# Patient Record
Sex: FEMALE | Race: WHITE | NOT HISPANIC OR LATINO | Employment: FULL TIME | URBAN - METROPOLITAN AREA
[De-identification: names, ages, dates, MRNs, and addresses within clinical notes are randomized per-mention and may not be internally consistent; named-entity substitution may affect disease eponyms.]

---

## 2017-02-03 ENCOUNTER — OFFICE VISIT (OUTPATIENT)
Dept: UROLOGY | Facility: CLINIC | Age: 32
End: 2017-02-03
Payer: COMMERCIAL

## 2017-02-03 VITALS
DIASTOLIC BLOOD PRESSURE: 66 MMHG | SYSTOLIC BLOOD PRESSURE: 124 MMHG | HEART RATE: 72 BPM | WEIGHT: 240 LBS | HEIGHT: 70 IN | BODY MASS INDEX: 34.36 KG/M2

## 2017-02-03 DIAGNOSIS — N20.0 NEPHROLITHIASIS: Primary | ICD-10-CM

## 2017-02-03 PROCEDURE — 99207 ZZC NO CHARGE LOS: CPT | Performed by: PHYSICIAN ASSISTANT

## 2017-02-03 RX ORDER — MULTIPLE VITAMINS W/ MINERALS TAB 9MG-400MCG
1 TAB ORAL DAILY
COMMUNITY
End: 2018-03-27

## 2017-02-03 ASSESSMENT — PAIN SCALES - GENERAL: PAINLEVEL: NO PAIN (0)

## 2017-02-03 NOTE — PROGRESS NOTES
Patient here to review results of Litholink but no results ever received. Litholink states no urine was ever received. Patient will follow up with Litholink with provided tracking number. Will contact patient once results available. Today's visit will be billed as no charge as nothing to discuss.  Mayi Hudson PA-C

## 2017-02-03 NOTE — Clinical Note
2/3/2017       RE: Georgette Sanchez  [address] [address] [address]     Dear Colleague,    Thank you for referring your patient, Georgette Sanchez, to the [facility] UROLOGY CLINIC [place] at [facility]. Please see a copy of my visit note below.    Patient here to review results of Litholink but no results ever received. Litholink states no urine was ever received. Patient will follow up with Litholink with provided tracking number. Will contact patient once results available. Today's visit will be billed as no charge as nothing to discuss.  Mayi Hudson PA-C    Again, thank you for allowing me to participate in the care of your patient.      Sincerely,    Mayi Hudson PA-C

## 2017-02-03 NOTE — NURSING NOTE
Chief Complaint   Patient presents with     Results     Go over 24 hour urine      Uma Green LPN

## 2017-03-19 ENCOUNTER — TRANSFERRED RECORDS (OUTPATIENT)
Dept: HEALTH INFORMATION MANAGEMENT | Facility: CLINIC | Age: 32
End: 2017-03-19

## 2017-05-04 ENCOUNTER — HOSPITAL ENCOUNTER (OUTPATIENT)
Dept: GENERAL RADIOLOGY | Facility: CLINIC | Age: 32
Discharge: HOME | End: 2017-05-04
Attending: PHYSICIAN ASSISTANT | Admitting: PHYSICIAN ASSISTANT
Payer: COMMERCIAL

## 2017-05-04 ENCOUNTER — TELEPHONE (OUTPATIENT)
Dept: UROLOGY | Facility: CLINIC | Age: 32
End: 2017-05-04

## 2017-05-04 ENCOUNTER — HOSPITAL ENCOUNTER (OUTPATIENT)
Dept: ULTRASOUND IMAGING | Facility: CLINIC | Age: 32
End: 2017-05-04
Attending: PHYSICIAN ASSISTANT
Payer: COMMERCIAL

## 2017-05-04 DIAGNOSIS — Z87.442 HISTORY OF KIDNEY STONES: Primary | ICD-10-CM

## 2017-05-04 DIAGNOSIS — Z87.442 HISTORY OF KIDNEY STONES: ICD-10-CM

## 2017-05-04 PROCEDURE — 74010 XR KUB: CPT | Mod: 52

## 2017-05-04 PROCEDURE — 76770 US EXAM ABDO BACK WALL COMP: CPT

## 2017-05-04 NOTE — TELEPHONE ENCOUNTER
Patient called and LM on nurse line. She is concerned she may have a Kidney stone. She has been having pain since April 4th. Returned patient's phone call and LM. Will wait for return phone call. Uma Green LPN

## 2017-05-09 ENCOUNTER — OFFICE VISIT (OUTPATIENT)
Dept: UROLOGY | Facility: CLINIC | Age: 32
End: 2017-05-09
Payer: COMMERCIAL

## 2017-05-09 VITALS
WEIGHT: 265 LBS | HEART RATE: 90 BPM | SYSTOLIC BLOOD PRESSURE: 126 MMHG | BODY MASS INDEX: 37.94 KG/M2 | DIASTOLIC BLOOD PRESSURE: 80 MMHG | HEIGHT: 70 IN

## 2017-05-09 DIAGNOSIS — N20.0 KIDNEY STONE: Primary | ICD-10-CM

## 2017-05-09 DIAGNOSIS — R10.9 FLANK PAIN: ICD-10-CM

## 2017-05-09 DIAGNOSIS — N20.0 RECURRENT NEPHROLITHIASIS: Primary | ICD-10-CM

## 2017-05-09 LAB
ALBUMIN UR-MCNC: NEGATIVE MG/DL
APPEARANCE UR: CLEAR
BILIRUB UR QL STRIP: NEGATIVE
COLOR UR AUTO: YELLOW
GLUCOSE UR STRIP-MCNC: NEGATIVE MG/DL
HGB UR QL STRIP: NEGATIVE
KETONES UR STRIP-MCNC: NEGATIVE MG/DL
LEUKOCYTE ESTERASE UR QL STRIP: NEGATIVE
NITRATE UR QL: NEGATIVE
PH UR STRIP: 6 PH (ref 5–7)
SP GR UR STRIP: >1.03 (ref 1–1.03)
URN SPEC COLLECT METH UR: NORMAL
UROBILINOGEN UR STRIP-ACNC: 0.2 EU/DL (ref 0.2–1)

## 2017-05-09 PROCEDURE — 81003 URINALYSIS AUTO W/O SCOPE: CPT | Performed by: PHYSICIAN ASSISTANT

## 2017-05-09 PROCEDURE — 99213 OFFICE O/P EST LOW 20 MIN: CPT | Performed by: PHYSICIAN ASSISTANT

## 2017-05-09 ASSESSMENT — PAIN SCALES - GENERAL: PAINLEVEL: NO PAIN (0)

## 2017-05-09 NOTE — MR AVS SNAPSHOT
After Visit Summary   5/9/2017    Georgette Sanchez    MRN: [id number]           Patient Information     Date Of Birth          1985        Visit Information        Provider Department      5/9/2017 4:00 PM Mayi Hudson PA-C [facility] [place]        Today's Diagnoses     Recurrent nephrolithiasis    -  1    Flank pain          Care Instructions    Nephrology Referral Information: [facility]: [facility]  [place] [phone]   [email or web address]    Please be aware that coverage of these services is subject to the terms and limitations of your health insurance plan.  Call member services at your health plan with any benefit or coverage questions.      Reason for referral:  Recurrent nephrolithiasis  Please bring the following to your appointment:    >>   Any x-rays, CTs or MRIs which have been performed.  Contact the facility where they were done to arrange for  prior to your scheduled appointment.   >>   List of current medications   >>   This referral request   >>   Any documents/labs given to you for this referral          Follow-ups after your visit        Additional Services     NEPHROLOGY ADULT REFERRAL       Your provider has referred you to: N: [facility]  [place] [phone]   [email or web address]    Please be aware that coverage of these services is subject to the terms and limitations of your health insurance plan.  Call member services at your health plan with any benefit or coverage questions.      Reason for referral:  Recurrent nephrolithiasis  Please bring the following to your appointment:    >>   Any x-rays, CTs or MRIs which have been performed.  Contact the facility where they were done to arrange for  prior to your scheduled appointment.   >>   List of current medications   >>   This referral request   >>   Any documents/labs given to you for this referral                  Who  "to contact     If you have questions or need follow up information about today's clinic visit or your schedule please contact [facility]OLOGY [facility] [place] directly at [phone].  Normal or non-critical lab and imaging results will be communicated to you by MyChart, letter or phone within 4 business days after the clinic has received the results. If you do not hear from us within 7 days, please contact the clinic through [employer] or phone. If you have a critical or abnormal lab result, we will notify you by phone as soon as possible.  Submit refill requests through [employer] or call your pharmacy and they will forward the refill request to us. Please allow 3 business days for your refill to be completed.          Additional Information About Your Visit        [employer] Information     [employer] gives you secure access to your electronic health record. If you see a primary care provider, you can also send messages to your care team and make appointments. If you have questions, please call your primary care clinic.  If you do not have a primary care provider, please call [phone] and they will assist you.        Care EveryWhere ID     This is your Care EveryWhere ID. This could be used by other organizations to access your [facility] medical records  [id number]        Your Vitals Were     Pulse Height Last Period Breastfeeding? BMI (Body Mass Index)       90 1.778 m (5' 10\") 04/24/2017 (Approximate) No 38.02 kg/m2        Blood Pressure from Last 3 Encounters:   05/09/17 126/80   02/03/17 124/66   12/06/16 116/72    Weight from Last 3 Encounters:   05/09/17 120.2 kg (265 lb)   02/03/17 108.9 kg (240 lb)   12/06/16 112 kg (247 lb)              We Performed the Following     NEPHROLOGY ADULT REFERRAL     UA without Microscopic [[id number]]          Today's Medication Changes          These changes are accurate as of: 5/9/17 11:59 PM.  If you have any questions, ask your nurse or doctor.             "   Stop taking these medicines if you haven't already. Please contact your care team if you have questions.     HYDROcodone-acetaminophen  MG per tablet   Commonly known as:  NORCO   Stopped by:  Mayi Hudson PA-C                    Primary Care Provider    Physician No Ref-Primary       No address on file        Thank you!     Thank you for choosing [facility]OLOG[facility] CLIN[facility] [place]  for your care. Our goal is always to provide you with excellent care. Hearing back from our patients is one way we can continue to improve our services. Please take a few minutes to complete the written survey that you may receive in the mail after your visit with us. Thank you!             Your Updated Medication List - Protect others around you: Learn how to safely use, store and throw away your medicines at www.disposemymeds.org.          This list is accurate as of: 5/9/17 11:59 PM.  Always use your most recent med list.                   Brand Name Dispense Instructions for use    ibuprofen 600 MG tablet    ADVIL/MOTRIN    30 tablet    Take 1 tablet (600 mg) by mouth every 6 hours as needed for moderate pain       Multi-vitamin Tabs tablet      Take 1 tablet by mouth daily

## 2017-05-09 NOTE — NURSING NOTE
Chief Complaint   Patient presents with     Results     KUB results     Akbar Crowe LPN 3:54 PM May 9, 2017

## 2017-05-09 NOTE — LETTER
Date:May 12, 2017      Patient was self referred, no letter generated. Do not send.        [facility] Health Information

## 2017-05-09 NOTE — LETTER
5/9/2017       RE: Georgette Sanchez  [address] [address]     Dear Colleague,    Thank you for referring your patient, Georgette Sanchez, to the [facility]OLOGY CLINIC [place] at [facility]. Please see a copy of my visit note below.    REASON FOR VISIT: review KUB, ultrasound results and discuss kidney stones    CLINICAL DATA: Ms. Georgette Sanchez is a very pleasant 32 year old female who returns for follow up of the above. I first saw her in 12/2016 when she was passing a 5 mm left ureteral stone. This did eventually pass and stone analysis revealed 40% CaOx, 60% CaP. She does have a long history of recurrent kidney stones - passes a few stones every year for the past several years. Has never required surgery for kidney stones. Did see urology through [facility] previously - 24 hour urine collections were recommended but she never followed through on them. Therefore, we ordered 24 hour urine collections which revealed significantly decreased urine volume (< 1 L/day), borderline elevated urine calcium, mild hypocitraturia, and mild hypouricosuria which put her at high risk for calcium phosphate stone formation and moderate risk for calcium oxalate stone formation. Therefore recommended increased water intake (goal of 2-3 liters per day), low salt diet, moderation of protein intake, consumption of foods which contain citric acid, and limiting foods which contain oxalate.    Patient then called clinic last week complaining of some bilateral flank pain (R > L) which started in early April. Symptoms reminiscent of her previous kidney stone episodes. Offered to order CT A/P non-contrast but patient elected for KUB with renal ultrasound instead. She returns today to review results. Has not had any pain the past 2 days. Has not seen a stone pass in her urine. Denies gross hematuria, dysuria, frequency, urgency, fevers, chills, N/V.    PEx  BP  "126/80  Pulse 90  Ht 1.778 m (5' 10\")  Wt 120.2 kg (265 lb)  LMP 04/24/2017 (Approximate)  Breastfeeding? No  BMI 38.02 kg/m2  GEN: well-appearing female in NAD  RESP: no increased respiratory effort  ABD: soft, NT, ND    Urine dip today negative without blood or leukocytes    XR KUB 5/6/2017 9:32 AM  HISTORY: Bilateral Flank Discomfort, Personal history of urinary  calculi      IMPRESSION: Negative.      ULTRASOUND RENAL COMPLETE 5/4/2017 4:36 PM   HISTORY: Personal history of urinary calculi.   FINDINGS: The right kidney measured 12.9 cm in length with a cortical  thickness of 1.7 cm and the left kidney 12.4 cm in length with a  cortical thickness of 1.6 cm. Mild right hydronephrosis was noted.  Renal cortical echogenicity was within normal limits. No renal mass or  cyst was identified. No renal stone was visible. The urinary bladder  was only partly distended precluding evaluation of the bladder  trigone.     IMPRESSION: Mild right hydronephrosis.      ASSESSMENT:  1) Right flank pain, resolved  2) History of nephrolithiasis, recurrent (most recent stone analysis from 12/8/16 with 40% CaOx, 60% CaP)  3) Mild right hydronephrosis on renal ultrasound with negative KUB    PLAN:  32 year old female with history of recurrent urinary stone disease with a few weeks of right flank pain reminiscent of previous kidney stones episodes. Recent KUB did not reveal any obvious stones, but renal ultrasound did demonstrate mild hydronephrosis, possibly indicative of a right-sided stone passing. She has had no pain the past 2 days but has not seen any stones pass. CT from 11/2016 did reveal a 1 mm stone in the right kidney and a 3 mm stone in the left kidney. Possible she is now passing this right-sided stone or has formed new stones in the interim which are passing. Patient feels it is the latter. We did order 24 hour urine collections (details above in HPI) and she has been working on dietary stone prevention measures (as " discussed above). However, given her long history of recurrent stone disease at a young age, would likely benefit from continued evaluation and input from nephrology.    -Referral to [facility] placed. Will request office notes, Litholink results, and lab/imaging tests be forwarded to them for review.  -Continue to work on dietary modifications for now.  -As she has no pain currently and urine is negative today, possible she has since passed a small stone from the right kidney.   -Offered to repeat renal ultrasound to confirm resolution of mild right hydro but patient declines at this time.    She will call if flank pain returns or symptoms worsen. Would then proceed with CT A/P w/o contrast to definitively evaluate for a ureteral stone.     Otherwise, follow up in 6 months with KUB, sooner as needed.    25 minutes spent with the patient, >50% in counseling and coordination of care.    Mayi Hudson PA-C  Urology        Again, thank you for allowing me to participate in the care of your patient.      Sincerely,    Mayi Hudson PA-C

## 2017-05-09 NOTE — PATIENT INSTRUCTIONS
Nephrology Referral Information: N: Brad Aquino [phone]   [email or web address]    Please be aware that coverage of these services is subject to the terms and limitations of your health insurance plan.  Call member services at your health plan with any benefit or coverage questions.      Reason for referral:  Recurrent nephrolithiasis  Please bring the following to your appointment:    >>   Any x-rays, CTs or MRIs which have been performed.  Contact the facility where they were done to arrange for  prior to your scheduled appointment.   >>   List of current medications   >>   This referral request   >>   Any documents/labs given to you for this referral

## 2017-05-11 NOTE — PROGRESS NOTES
"REASON FOR VISIT: review KUB, ultrasound results and discuss kidney stones    CLINICAL DATA: Ms. Georgette Sanchez is a very pleasant 32 year old female who returns for follow up of the above. I first saw her in 12/2016 when she was passing a 5 mm left ureteral stone. This did eventually pass and stone analysis revealed 40% CaOx, 60% CaP. She does have a long history of recurrent kidney stones - passes a few stones every year for the past several years. Has never required surgery for kidney stones. Did see urology through [facility] previously - 24 hour urine collections were recommended but she never followed through on them. Therefore, we ordered 24 hour urine collections which revealed significantly decreased urine volume (< 1 L/day), borderline elevated urine calcium, mild hypocitraturia, and mild hypouricosuria which put her at high risk for calcium phosphate stone formation and moderate risk for calcium oxalate stone formation. Therefore recommended increased water intake (goal of 2-3 liters per day), low salt diet, moderation of protein intake, consumption of foods which contain citric acid, and limiting foods which contain oxalate.    Patient then called clinic last week complaining of some bilateral flank pain (R > L) which started in early April. Symptoms reminiscent of her previous kidney stone episodes. Offered to order CT A/P non-contrast but patient elected for KUB with renal ultrasound instead. She returns today to review results. Has not had any pain the past 2 days. Has not seen a stone pass in her urine. Denies gross hematuria, dysuria, frequency, urgency, fevers, chills, N/V.    PEx  /80  Pulse 90  Ht 1.778 m (5' 10\")  Wt 120.2 kg (265 lb)  LMP 04/24/2017 (Approximate)  Breastfeeding? No  BMI 38.02 kg/m2  GEN: well-appearing female in NAD  RESP: no increased respiratory effort  ABD: soft, NT, ND    Urine dip today negative without blood or leukocytes    XR KUB 5/6/2017 9:32 " AM  HISTORY: Bilateral Flank Discomfort, Personal history of urinary  calculi      IMPRESSION: Negative.      ULTRASOUND RENAL COMPLETE 5/4/2017 4:36 PM   HISTORY: Personal history of urinary calculi.   FINDINGS: The right kidney measured 12.9 cm in length with a cortical  thickness of 1.7 cm and the left kidney 12.4 cm in length with a  cortical thickness of 1.6 cm. Mild right hydronephrosis was noted.  Renal cortical echogenicity was within normal limits. No renal mass or  cyst was identified. No renal stone was visible. The urinary bladder  was only partly distended precluding evaluation of the bladder  trigone.     IMPRESSION: Mild right hydronephrosis.      ASSESSMENT:  1) Right flank pain, resolved  2) History of nephrolithiasis, recurrent (most recent stone analysis from 12/8/16 with 40% CaOx, 60% CaP)  3) Mild right hydronephrosis on renal ultrasound with negative KUB    PLAN:  32 year old female with history of recurrent urinary stone disease with a few weeks of right flank pain reminiscent of previous kidney stones episodes. Recent KUB did not reveal any obvious stones, but renal ultrasound did demonstrate mild hydronephrosis, possibly indicative of a right-sided stone passing. She has had no pain the past 2 days but has not seen any stones pass. CT from 11/2016 did reveal a 1 mm stone in the right kidney and a 3 mm stone in the left kidney. Possible she is now passing this right-sided stone or has formed new stones in the interim which are passing. Patient feels it is the latter. We did order 24 hour urine collections (details above in HPI) and she has been working on dietary stone prevention measures (as discussed above). However, given her long history of recurrent stone disease at a young age, would likely benefit from continued evaluation and input from nephrology.    -Referral to [facility] placed. Will request office notes, Litholink results, and lab/imaging tests be forwarded to them for  review.  -Continue to work on dietary modifications for now.  -As she has no pain currently and urine is negative today, possible she has since passed a small stone from the right kidney.   -Offered to repeat renal ultrasound to confirm resolution of mild right hydro but patient declines at this time.    She will call if flank pain returns or symptoms worsen. Would then proceed with CT A/P w/o contrast to definitively evaluate for a ureteral stone.     Otherwise, follow up in 6 months with KUB, sooner as needed.    25 minutes spent with the patient, >50% in counseling and coordination of care.    Mayi Hudson PA-C  Urology

## 2017-08-30 ENCOUNTER — TRANSFERRED RECORDS (OUTPATIENT)
Dept: HEALTH INFORMATION MANAGEMENT | Facility: CLINIC | Age: 32
End: 2017-08-30

## 2017-09-08 DIAGNOSIS — N20.0 URIC ACID NEPHROLITHIASIS: Primary | ICD-10-CM

## 2017-10-03 ENCOUNTER — HOSPITAL ENCOUNTER (OUTPATIENT)
Dept: LAB | Facility: CLINIC | Age: 32
Discharge: HOME | End: 2017-10-03
Attending: INTERNAL MEDICINE | Admitting: INTERNAL MEDICINE
Payer: COMMERCIAL

## 2017-10-03 DIAGNOSIS — N20.0 URIC ACID NEPHROLITHIASIS: ICD-10-CM

## 2017-10-03 LAB
ALBUMIN SERPL-MCNC: 4.3 G/DL (ref 3.4–5)
ANION GAP SERPL CALCULATED.3IONS-SCNC: 4 MMOL/L (ref 3–14)
BUN SERPL-MCNC: 13 MG/DL (ref 7–30)
CALCIUM SERPL-MCNC: 9.4 MG/DL (ref 8.5–10.1)
CHLORIDE SERPL-SCNC: 98 MMOL/L (ref 94–109)
CO2 SERPL-SCNC: 30 MMOL/L (ref 20–32)
CREAT SERPL-MCNC: 0.77 MG/DL (ref 0.52–1.04)
GFR SERPL CREATININE-BSD FRML MDRD: 86 ML/MIN/1.7M2
GLUCOSE SERPL-MCNC: 92 MG/DL (ref 70–99)
PHOSPHATE SERPL-MCNC: 2.2 MG/DL (ref 2.5–4.5)
POTASSIUM SERPL-SCNC: 3.1 MMOL/L (ref 3.4–5.3)
SODIUM SERPL-SCNC: 132 MMOL/L (ref 133–144)

## 2017-10-03 PROCEDURE — 80069 RENAL FUNCTION PANEL: CPT | Performed by: INTERNAL MEDICINE

## 2017-10-03 PROCEDURE — 36415 COLL VENOUS BLD VENIPUNCTURE: CPT | Performed by: INTERNAL MEDICINE

## 2017-10-09 DIAGNOSIS — N20.0 URIC ACID NEPHROLITHIASIS: Primary | ICD-10-CM

## 2017-10-09 DIAGNOSIS — E66.9 ADIPOSITY: ICD-10-CM

## 2017-12-31 ENCOUNTER — HEALTH MAINTENANCE LETTER (OUTPATIENT)
Age: 32
End: 2017-12-31

## 2018-02-16 DIAGNOSIS — E87.6 HYPOPOTASSEMIA: Primary | ICD-10-CM

## 2018-02-16 DIAGNOSIS — E83.52 HYPERCALCEMIA: ICD-10-CM

## 2018-03-20 ENCOUNTER — HOSPITAL ENCOUNTER (OUTPATIENT)
Dept: LAB | Facility: CLINIC | Age: 33
Discharge: HOME | End: 2018-03-20
Attending: INTERNAL MEDICINE | Admitting: INTERNAL MEDICINE
Payer: COMMERCIAL

## 2018-03-20 DIAGNOSIS — E83.52 HYPERCALCEMIA: ICD-10-CM

## 2018-03-20 DIAGNOSIS — E87.6 HYPOPOTASSEMIA: ICD-10-CM

## 2018-03-20 LAB
ALBUMIN SERPL-MCNC: 4.3 G/DL (ref 3.4–5)
ANION GAP SERPL CALCULATED.3IONS-SCNC: 8 MMOL/L (ref 3–14)
BUN SERPL-MCNC: 14 MG/DL (ref 7–30)
CALCIUM SERPL-MCNC: 9.9 MG/DL (ref 8.5–10.1)
CHLORIDE SERPL-SCNC: 102 MMOL/L (ref 94–109)
CO2 SERPL-SCNC: 28 MMOL/L (ref 20–32)
CREAT SERPL-MCNC: 0.8 MG/DL (ref 0.52–1.04)
GFR SERPL CREATININE-BSD FRML MDRD: 83 ML/MIN/1.7M2
GLUCOSE SERPL-MCNC: 87 MG/DL (ref 70–99)
PHOSPHATE SERPL-MCNC: 1.5 MG/DL (ref 2.5–4.5)
POTASSIUM SERPL-SCNC: 3.5 MMOL/L (ref 3.4–5.3)
SODIUM SERPL-SCNC: 138 MMOL/L (ref 133–144)

## 2018-03-20 PROCEDURE — 80069 RENAL FUNCTION PANEL: CPT | Performed by: INTERNAL MEDICINE

## 2018-03-20 PROCEDURE — 36415 COLL VENOUS BLD VENIPUNCTURE: CPT | Performed by: INTERNAL MEDICINE

## 2018-03-27 ENCOUNTER — AMBULATORY - HEALTHEAST (OUTPATIENT)
Dept: MULTI SPECIALTY CLINIC | Facility: CLINIC | Age: 33
End: 2018-03-27

## 2018-03-27 ENCOUNTER — OFFICE VISIT (OUTPATIENT)
Dept: INTERNAL MEDICINE | Facility: CLINIC | Age: 33
End: 2018-03-27
Payer: COMMERCIAL

## 2018-03-27 ENCOUNTER — HOSPITAL ENCOUNTER (OUTPATIENT)
Dept: LAB | Facility: CLINIC | Age: 33
Discharge: HOME | End: 2018-03-27
Attending: INTERNAL MEDICINE | Admitting: INTERNAL MEDICINE
Payer: COMMERCIAL

## 2018-03-27 VITALS
DIASTOLIC BLOOD PRESSURE: 80 MMHG | OXYGEN SATURATION: 98 % | TEMPERATURE: 98.4 F | HEIGHT: 69 IN | WEIGHT: 251.2 LBS | SYSTOLIC BLOOD PRESSURE: 100 MMHG | BODY MASS INDEX: 37.2 KG/M2 | HEART RATE: 97 BPM

## 2018-03-27 DIAGNOSIS — N93.8 DYSFUNCTIONAL UTERINE BLEEDING: ICD-10-CM

## 2018-03-27 DIAGNOSIS — N20.0 NEPHROLITHIASIS: ICD-10-CM

## 2018-03-27 DIAGNOSIS — Z00.00 ENCOUNTER FOR ROUTINE ADULT HEALTH EXAMINATION WITHOUT ABNORMAL FINDINGS: Primary | ICD-10-CM

## 2018-03-27 DIAGNOSIS — N20.0 URIC ACID NEPHROLITHIASIS: ICD-10-CM

## 2018-03-27 DIAGNOSIS — E66.9 ADIPOSITY: ICD-10-CM

## 2018-03-27 DIAGNOSIS — Z80.3 FAMILY HISTORY OF BREAST CANCER: ICD-10-CM

## 2018-03-27 LAB
ALBUMIN SERPL-MCNC: 4.5 G/DL (ref 3.4–5)
ALP SERPL-CCNC: 62 U/L (ref 40–150)
ALT SERPL W P-5'-P-CCNC: 42 U/L (ref 0–50)
ANION GAP SERPL CALCULATED.3IONS-SCNC: 5 MMOL/L (ref 3–14)
AST SERPL W P-5'-P-CCNC: 24 U/L (ref 0–45)
BASOPHILS # BLD AUTO: 0.1 10E9/L (ref 0–0.2)
BASOPHILS NFR BLD AUTO: 1.4 %
BILIRUB DIRECT SERPL-MCNC: 0.2 MG/DL (ref 0–0.2)
BILIRUB SERPL-MCNC: 1.2 MG/DL (ref 0.2–1.3)
BUN SERPL-MCNC: 10 MG/DL (ref 7–30)
CALCIUM SERPL-MCNC: 9.3 MG/DL (ref 8.5–10.1)
CHLORIDE SERPL-SCNC: 100 MMOL/L (ref 94–109)
CHOLEST SERPL-MCNC: 202 MG/DL
CO2 SERPL-SCNC: 29 MMOL/L (ref 20–32)
CREAT SERPL-MCNC: 0.71 MG/DL (ref 0.52–1.04)
DIFFERENTIAL METHOD BLD: NORMAL
EOSINOPHIL # BLD AUTO: 0.2 10E9/L (ref 0–0.7)
EOSINOPHIL NFR BLD AUTO: 2.1 %
ERYTHROCYTE [DISTWIDTH] IN BLOOD BY AUTOMATED COUNT: 12 % (ref 10–15)
GFR SERPL CREATININE-BSD FRML MDRD: >90 ML/MIN/1.7M2
GLUCOSE SERPL-MCNC: 87 MG/DL (ref 70–99)
HCT VFR BLD AUTO: 41.1 % (ref 35–47)
HDLC SERPL-MCNC: 46 MG/DL
HGB BLD-MCNC: 14.2 G/DL (ref 11.7–15.7)
HPV_EXT - HISTORICAL: NORMAL
IMM GRANULOCYTES # BLD: 0 10E9/L (ref 0–0.4)
IMM GRANULOCYTES NFR BLD: 0.3 %
LDLC SERPL CALC-MCNC: 132 MG/DL
LYMPHOCYTES # BLD AUTO: 2 10E9/L (ref 0.8–5.3)
LYMPHOCYTES NFR BLD AUTO: 27.5 %
MCH RBC QN AUTO: 32 PG (ref 26.5–33)
MCHC RBC AUTO-ENTMCNC: 34.5 G/DL (ref 31.5–36.5)
MCV RBC AUTO: 93 FL (ref 78–100)
MONOCYTES # BLD AUTO: 0.7 10E9/L (ref 0–1.3)
MONOCYTES NFR BLD AUTO: 10.1 %
NEUTROPHILS # BLD AUTO: 4.2 10E9/L (ref 1.6–8.3)
NEUTROPHILS NFR BLD AUTO: 58.6 %
NONHDLC SERPL-MCNC: 156 MG/DL
NRBC # BLD AUTO: 0 10*3/UL
NRBC BLD AUTO-RTO: 0 /100
PAP SMEAR - HIM PATIENT REPORTED: NORMAL
PLATELET # BLD AUTO: 330 10E9/L (ref 150–450)
POTASSIUM SERPL-SCNC: 3.6 MMOL/L (ref 3.4–5.3)
PROT SERPL-MCNC: 8.2 G/DL (ref 6.8–8.8)
RBC # BLD AUTO: 4.44 10E12/L (ref 3.8–5.2)
SODIUM SERPL-SCNC: 134 MMOL/L (ref 133–144)
TRIGL SERPL-MCNC: 118 MG/DL
TSH SERPL DL<=0.005 MIU/L-ACNC: 1.41 MU/L (ref 0.4–4)
WBC # BLD AUTO: 7.2 10E9/L (ref 4–11)

## 2018-03-27 PROCEDURE — 80048 BASIC METABOLIC PNL TOTAL CA: CPT | Performed by: INTERNAL MEDICINE

## 2018-03-27 PROCEDURE — 36415 COLL VENOUS BLD VENIPUNCTURE: CPT | Performed by: INTERNAL MEDICINE

## 2018-03-27 PROCEDURE — G0145 SCR C/V CYTO,THINLAYER,RESCR: HCPCS | Performed by: INTERNAL MEDICINE

## 2018-03-27 PROCEDURE — 85025 COMPLETE CBC W/AUTO DIFF WBC: CPT | Performed by: INTERNAL MEDICINE

## 2018-03-27 PROCEDURE — 80061 LIPID PANEL: CPT | Performed by: INTERNAL MEDICINE

## 2018-03-27 PROCEDURE — 80076 HEPATIC FUNCTION PANEL: CPT | Performed by: INTERNAL MEDICINE

## 2018-03-27 PROCEDURE — 87624 HPV HI-RISK TYP POOLED RSLT: CPT | Performed by: INTERNAL MEDICINE

## 2018-03-27 PROCEDURE — 99395 PREV VISIT EST AGE 18-39: CPT | Performed by: INTERNAL MEDICINE

## 2018-03-27 PROCEDURE — 84443 ASSAY THYROID STIM HORMONE: CPT | Performed by: INTERNAL MEDICINE

## 2018-03-27 NOTE — NURSING NOTE
"Chief Complaint   Patient presents with     Physical       Initial /80 (Cuff Size: Adult Large)  Pulse 97  Temp 98.4  F (36.9  C) (Oral)  Ht 5' 9.2\" (1.758 m)  Wt 251 lb 3.2 oz (113.9 kg)  LMP 03/16/2018  SpO2 98%  BMI 36.88 kg/m2 Estimated body mass index is 36.88 kg/(m^2) as calculated from the following:    Height as of this encounter: 5' 9.2\" (1.758 m).    Weight as of this encounter: 251 lb 3.2 oz (113.9 kg).  Medication Reconciliation: complete    "

## 2018-03-27 NOTE — MR AVS SNAPSHOT
After Visit Summary   3/27/2018    Georgette Sanchez    MRN: [id number]           Patient Information     Date Of Birth          1985        Visit Information        Provider Department      3/27/2018 1:00 PM Krystle Suárez MD [facility]        Today's Diagnoses     Encounter for routine adult health examination without abnormal findings    -  1    Dysfunctional uterine bleeding        Nephrolithiasis        Family history of breast cancer          Care Instructions    lamisil    vicks vapor rub          Follow-ups after your visit        Additional Services     CANCER RISK MGMT/CANCER GENETIC COUNSELING REFERRAL       Your provider has referred you to the Cancer Risk Management Program - Cancer Genetic Counseling.    Reason for Referral: h/o breast cancer in family- mom and aunt    We have a sent a notice to a staff member of the Cancer Risk Management Program to give you a call to assist with scheduling your appointment.  You may also call  [phone] ([phone]) to initiate scheduling.    Please be aware that coverage of these services is subject to the terms and limitations of your health insurance plan.  Call member services at your health plan with any benefit or coverage questions.      Please bring the completed family history sheet to your appointment in addition to any available outside medical records documenting your cancer diagnosis.            OB/GYN REFERRAL       Your provider has referred you to:  [facility]: [facility] [phone]   [email or web address]      Please be aware that coverage of these services is subject to the terms and limitations of your health insurance plan.  Call member services at your health plan with any benefit or coverage questions.      Please bring the following with you to your appointment:    (1) Any X-Rays, CTs or MRIs which have been performed.  Contact the facility  "where they were done to arrange for  prior to your scheduled appointment.   (2) List of current medications   (3) This referral request   (4) Any documents/labs given to you for this referral                  Who to contact     If you have questions or need follow up information about today's clinic visit or your schedule please contact [facility] directly at [phone].  Normal or non-critical lab and imaging results will be communicated to you by MyChart, letter or phone within 4 business days after the clinic has received the results. If you do not hear from us within 7 days, please contact the clinic through [employer] or phone. If you have a critical or abnormal lab result, we will notify you by phone as soon as possible.  Submit refill requests through [employer] or call your pharmacy and they will forward the refill request to us. Please allow 3 business days for your refill to be completed.          Additional Information About Your Visit        [employer] Information     [employer] gives you secure access to your electronic health record. If you see a primary care provider, you can also send messages to your care team and make appointments. If you have questions, please call your primary care clinic.  If you do not have a primary care provider, please call [phone] and they will assist you.        Care EveryWhere ID     This is your Care EveryWhere ID. This could be used by other organizations to access your [facility] medical records  [id number]        Your Vitals Were     Pulse Temperature Height Last Period Pulse Oximetry BMI (Body Mass Index)    97 98.4  F (36.9  C) (Oral) 5' 9.2\" (1.758 m) 03/16/2018 98% 36.88 kg/m2       Blood Pressure from Last 3 Encounters:   03/27/18 100/80   05/09/17 126/80   02/03/17 124/66    Weight from Last 3 Encounters:   03/27/18 251 lb 3.2 oz (113.9 kg)   05/09/17 265 lb (120.2 kg)   02/03/17 240 lb (108.9 kg)              We Performed the Following     " CANCER RISK MGMT/CANCER GENETIC COUNSELING REFERRAL     CBC with platelets differential     Hepatic panel (Albumin, ALT, AST, Bili, Alk Phos, TP)     Lipid panel reflex to direct LDL Fasting     OB/GYN REFERRAL     TSH with free T4 reflex        Primary Care Provider Fax #    Physician No Ref-Primary [phone]       No address on file        Equal Access to Services     BET[place] CHIO : Hadii aad ku hadtaraho Soomaali, waaxda luqadaha, qaybta kaalmada adeainsleysuzie, amira smith hayleylorena francistaylorjasmin boo. So [facility] [phone].    ATENCIÓN: Si habla español, tiene a watts disposición servicios gratuitos de asistencia lingüística. [facility] [phone].    We comply with applicable federal civil rights laws and [place] laws. We do not discriminate on the basis of race, color, national origin, age, disability, sex, sexual orientation, or gender identity.            Thank you!     Thank you for choosing [facility]  for your care. Our goal is always to provide you with excellent care. Hearing back from our patients is one way we can continue to improve our services. Please take a few minutes to complete the written survey that you may receive in the mail after your visit with us. Thank you!             Your Updated Medication List - Protect others around you: Learn how to safely use, store and throw away your medicines at www.disposemymeds.org.          This list is accurate as of 3/27/18  2:02 PM.  Always use your most recent med list.                   Brand Name Dispense Instructions for use Diagnosis    CHLORTHALIDONE PO      Take 25 mg by mouth daily        ibuprofen 600 MG tablet    ADVIL/MOTRIN    30 tablet    Take 1 tablet (600 mg) by mouth every 6 hours as needed for moderate pain

## 2018-03-29 LAB
COPATH REPORT: NORMAL
PAP: NORMAL

## 2018-04-02 LAB
FINAL DIAGNOSIS: NORMAL
HPV HR 12 DNA CVX QL NAA+PROBE: NEGATIVE
HPV16 DNA SPEC QL NAA+PROBE: NEGATIVE
HPV18 DNA SPEC QL NAA+PROBE: NEGATIVE
SPECIMEN DESCRIPTION: NORMAL
SPECIMEN SOURCE CVX/VAG CYTO: NORMAL

## 2018-04-15 ENCOUNTER — NURSE TRIAGE (OUTPATIENT)
Dept: NURSING | Facility: CLINIC | Age: 33
End: 2018-04-15

## 2018-04-15 ENCOUNTER — VIRTUAL VISIT (OUTPATIENT)
Dept: FAMILY MEDICINE | Facility: OTHER | Age: 33
End: 2018-04-15

## 2018-04-15 NOTE — PROGRESS NOTES
"Date:   Clinician: Rosanna Blair  Clinician NPI: [id number]  Patient: Georgette Sanchez  Patient : 1985  Patient Address: [address] [place] [id number]  Patient Phone: [phone]  Visit Protocol: Shingles  Patient Summary:  Georgette is a 33 year old ( : 1985 ) female who initiated a Visit for suspected Herpes zoster (shingles). When asked the question \"Please sign me up to receive news, health information and promotions from [facility].\", Georgette responded \"No\".    Images of her skin condition were uploaded.   Her symptoms started 1-3 days ago. The right side of her body is affected. The rash is located on her face. The rash is red and includes dry skin, crusts, scabs, drainage, and sores.   The affected area feels painful, like it burns, tender to touch, warm to touch, and itchy. The symptoms do not interfere with her sleep. Georgette experienced pain or unusual sensations in the location of the rash before it appeared.   Symptom details     Drainage: The color of the drainage is clear.    Pain: The pain is mild (between 1-3 on a 10 point scale).     Denied symptoms include scaly skin, flaky skin, numbness, and blisters. Georgette does not feel feverish.   Pertinent medical history  Georgette has had chickenpox and has had shingles in the past.   Ongoing medical conditions were denied.   She denies pregnancy and denies breastfeeding. She is currently menstruating.   Georgette does not smoke or use smokeless tobacco.  MEDICATIONS:    Atenolol-chlorthalidone oral  , ALLERGIES:     Penicillins    Clinician Response:  Dear Georgette,  Based on the information provided, you have Herpes Zoster (shingles). Shingles is a blistered rash caused by the same virus that causes chickenpox - Varicella Zoster.  Everyone who comes down with shingles has had chickenpox at some time in their life. After recovering from chickenpox, the inactive virus remains in the nerve cells. Shingles develops if the virus " becomes active. This reactivation can happen years or even decades later.   Because the virus spreads along a nerve, the rash is painful and appears as a stripe along one side of the body. The rash contains groups of blisters that break, crust over, and resolve within 3-6 weeks. Although rare, it is possible for the pain to last weeks after the rash has completely healed.  Medication information  I am prescribing:   Valacyclovir (Valtrex) 1 gram oral tablet. Take 1 tablet by mouth every 8 hours for 7 days. There are no refills with this prescription.  Medication is used to help speed up the healing process, but may not take your symptoms away completely.  Self care  Shingles is not spread from one person to another. However, because shingles and chickenpox are caused by the same virus, you could spread chickenpox to someone who has not had the illness or been vaccinated against it. Cover the rash with a loose bandage until all blisters scab over to prevent spreading the virus to those at risk for getting chickenpox.  Steps you can take to be as comfortable as possible:     Avoid touching the rash    Apply a cool, wet washcloth to your rash for 15 minutes several times a day    Take a lukewarm bath to soothe the skin. Adding colloidal oatmeal can help even more    Choose clothing and bedding made of a breathable material like cotton    Do not use antibiotic creams or ointments unless recommended by a provider     When to seek care  Please make an appointment to be seen in a clinic or go to an urgent care if any of the following occur:     Your rash doesn't improve after 14 days    You develop new symptoms or your symptoms become worse    Symptoms are so severe that you are unable to sleep or do regular activities    You are still experiencing pain one month after your shingles rash has completely healed    You notice symptoms of a skin infection (spreading redness, pain that is not improving, fevers, warmth)     Seek  care in an emergency room if you develop a fever, headache and sensitivity to light.   Diagnosis: Herpes zoster (shingles)  Diagnosis ICD: B02.8  Additional Clinician Notes:  Though your symptoms are consistent with shingles, it is a little difficult to tell from the pictures you uploaded. Given this I will issue the treatment plan for shingles including the virus medication. Please take this for the next week. If you get worse or not completely better in a week then please make sure you see your doctor.   Prescription: valacyclovir (Valtrex) 1 gram oral tablet 21 tablet, 7 days supply. Take 1 tablet by mouth every 8 hours for 7 days. Refills: 0, Refill as needed: no, Allow substitutions: yes  Pharmacy: [facility] [id number] - [phone] - [address] [address]

## 2018-04-15 NOTE — TELEPHONE ENCOUNTER
Patient states she has been treated for shingles in the past and has it again.  States went to  and then found out it was closed.  Patient requesting prescription for shingles.  FNA provided information for [facility].

## 2018-06-04 ENCOUNTER — OFFICE VISIT (OUTPATIENT)
Dept: INTERNAL MEDICINE | Facility: CLINIC | Age: 33
End: 2018-06-04
Payer: COMMERCIAL

## 2018-06-04 VITALS
BODY MASS INDEX: 38.98 KG/M2 | WEIGHT: 263.2 LBS | HEART RATE: 90 BPM | DIASTOLIC BLOOD PRESSURE: 80 MMHG | OXYGEN SATURATION: 98 % | TEMPERATURE: 98.4 F | SYSTOLIC BLOOD PRESSURE: 120 MMHG | HEIGHT: 69 IN | RESPIRATION RATE: 16 BRPM

## 2018-06-04 DIAGNOSIS — L73.9 FOLLICULITIS: ICD-10-CM

## 2018-06-04 DIAGNOSIS — L02.93 CARBUNCLE: Primary | ICD-10-CM

## 2018-06-04 PROCEDURE — 99213 OFFICE O/P EST LOW 20 MIN: CPT | Performed by: NURSE PRACTITIONER

## 2018-06-04 RX ORDER — CEPHALEXIN 500 MG/1
500 CAPSULE ORAL 2 TIMES DAILY
Qty: 20 CAPSULE | Refills: 0 | Status: SHIPPED | OUTPATIENT
Start: 2018-06-04 | End: 2019-01-31

## 2018-06-04 NOTE — NURSING NOTE
"Vital signs:  Temp: 98.4  F (36.9  C) Temp src: Oral BP: 120/80 Pulse: 90   Resp: 16 SpO2: 98 %     Height: 5' 9.2\" (175.8 cm) Weight: 263 lb 3.2 oz (119.4 kg)  Estimated body mass index is 38.64 kg/(m^2) as calculated from the following:    Height as of this encounter: 5' 9.2\" (1.758 m).    Weight as of this encounter: 263 lb 3.2 oz (119.4 kg).          "

## 2018-06-04 NOTE — PROGRESS NOTES
"  SUBJECTIVE:   Georgette Sanchez is a 33 year old female who presents to clinic today for the following health issues:      boils      Duration: 2 weeks    Description (location/character/radiation): L inner thigh large, tender boil. Suprapubic multiple small \"pimples\"    Intensity:  mild, moderate    Accompanying signs and symptoms: none    History (similar episodes/previous evaluation): boils 3-4 x yearly    Precipitating or alleviating factors: shaving pubic area    Therapies tried and outcome: None       Patient Active Problem List   Diagnosis     Nephrolithiasis     Dysfunctional uterine bleeding     Family history of breast cancer     Carbuncle     Folliculitis     Past Surgical History:   Procedure Laterality Date     AS REDUCTION OF LARGE BREAST  2001     TONSILLECTOMY         Social History   Substance Use Topics     Smoking status: Former Smoker     Types: Cigarettes     Smokeless tobacco: Never Used     Alcohol use 0.0 oz/week     0 Standard drinks or equivalent per week      Comment: socially     Family History   Problem Relation Age of Onset     Family History Negative Mother      Family History Negative Father          Current Outpatient Prescriptions   Medication Sig Dispense Refill     cephALEXin (KEFLEX) 500 MG capsule Take 1 capsule (500 mg) by mouth 2 times daily 20 capsule 0     CHLORTHALIDONE PO Take 25 mg by mouth daily       ibuprofen (ADVIL,MOTRIN) 600 MG tablet Take 1 tablet (600 mg) by mouth every 6 hours as needed for moderate pain 30 tablet 1     BP Readings from Last 3 Encounters:   06/04/18 120/80   03/27/18 100/80   05/09/17 126/80    Wt Readings from Last 3 Encounters:   06/04/18 263 lb 3.2 oz (119.4 kg)   03/27/18 251 lb 3.2 oz (113.9 kg)   05/09/17 265 lb (120.2 kg)                    Reviewed and updated as needed this visit by clinical staff  Tobacco  Allergies  Meds  Problems  Med Hx  Surg Hx  Fam Hx  Soc Hx        Reviewed and updated as needed this visit by Provider     " "    ROS:  CONSTITUTIONAL: NEGATIVE for fever, chills, change in weight  ENT/MOUTH: NEGATIVE for ear, mouth and throat problems  RESP: NEGATIVE for significant cough or SOB  CV: NEGATIVE for chest pain, palpitations or peripheral edema    OBJECTIVE:     /80  Pulse 90  Temp 98.4  F (36.9  C) (Oral)  Resp 16  Ht 5' 9.2\" (1.758 m)  Wt 263 lb 3.2 oz (119.4 kg)  SpO2 98%  BMI 38.64 kg/m2  Body mass index is 38.64 kg/(m^2).  GENERAL: alert, no distress and obese  SKIN: L medial thigh large single carbuncle, no drainage.  Suprapubic scattered folliculitis        ASSESSMENT/PLAN:               ICD-10-CM    1. Carbuncle L02.93 cephALEXin (KEFLEX) 500 MG capsule   2. Folliculitis L73.9 cephALEXin (KEFLEX) 500 MG capsule       Patient Instructions   antibacterial soap or Hibicens liquid  Avoid shaving, constrictive clothing  Genesis Mendez, NP  [facility]    "

## 2018-06-04 NOTE — MR AVS SNAPSHOT
"              After Visit Summary   6/4/2018    Georgette Sanchez    MRN: [id number]           Patient Information     Date Of Birth          1985        Visit Information        Provider Department      6/4/2018 3:40 PM Genesis Mendez NP [facility]        Today's Diagnoses     Carbuncle    -  1    Folliculitis          Care Instructions    antibacterial soap or Hibicens liquid  Avoid shaving, constrictive clothing  Genesis Mendez CNP            Follow-ups after your visit        Who to contact     If you have questions or need follow up information about today's clinic visit or your schedule please contact [facility] directly at [phone].  Normal or non-critical lab and imaging results will be communicated to you by [employer]Outhart, letter or phone within 4 business days after the clinic has received the results. If you do not hear from us within 7 days, please contact the clinic through [employer] or phone. If you have a critical or abnormal lab result, we will notify you by phone as soon as possible.  Submit refill requests through Slated or call your pharmacy and they will forward the refill request to us. Please allow 3 business days for your refill to be completed.          Additional Information About Your Visit        MyChart Information     Slated gives you secure access to your electronic health record. If you see a primary care provider, you can also send messages to your care team and make appointments. If you have questions, please call your primary care clinic.  If you do not have a primary care provider, please call [phone] and they will assist you.        Care EveryWhere ID     This is your Care EveryWhere ID. This could be used by other organizations to access your [place] medical records  [id number]        Your Vitals Were     Pulse Temperature Respirations Height Pulse Oximetry BMI (Body Mass Index)    90 98.4  F (36.9  C) (Oral) 16 5' 9.2\" (1.758 m) " 98% 38.64 kg/m2       Blood Pressure from Last 3 Encounters:   06/04/18 120/80   03/27/18 100/80   05/09/17 126/80    Weight from Last 3 Encounters:   06/04/18 263 lb 3.2 oz (119.4 kg)   03/27/18 251 lb 3.2 oz (113.9 kg)   05/09/17 265 lb (120.2 kg)              Today, you had the following     No orders found for display         Today's Medication Changes          These changes are accurate as of 6/4/18  4:12 PM.  If you have any questions, ask your nurse or doctor.               Start taking these medicines.        Dose/Directions    cephALEXin 500 MG capsule   Commonly known as:  KEFLEX   Used for:  Carbuncle, Folliculitis   Started by:  Genesis Mendez NP        Dose:  500 mg   Take 1 capsule (500 mg) by mouth 2 times daily   Quantity:  20 capsule   Refills:  0            Where to get your medicines      These medications were sent to [facility] [place] [address] [place] [address]     Phone:  [phone]     cephALEXin 500 MG capsule                Primary Care Provider Fax #    Physician No Ref-Primary [phone]       No address on file        Equal Access to Services     KRISTEL BARROSO AH: Jevon martinez hadasho Soomaali, waaxda luqadaha, qaybta kaalmada adeegyada, amira boo. So [facility] [phone].    ATENCIÓN: Si habla español, tiene a watts disposición servicios gratuitos de asistencia lingüística. Llame al [phone].    We comply with applicable federal civil rights laws and [place] laws. We do not discriminate on the basis of race, color, national origin, age, disability, sex, sexual orientation, or gender identity.            Thank you!     Thank you for choosing [facility]  for your care. Our goal is always to provide you with excellent care. Hearing back from our patients is one way we can continue to improve our services. Please take a few minutes to complete the written survey that  you may receive in the mail after your visit with us. Thank you!             Your Updated Medication List - Protect others around you: Learn how to safely use, store and throw away your medicines at www.disposemymeds.org.          This list is accurate as of 6/4/18  4:12 PM.  Always use your most recent med list.                   Brand Name Dispense Instructions for use Diagnosis    cephALEXin 500 MG capsule    KEFLEX    20 capsule    Take 1 capsule (500 mg) by mouth 2 times daily    Carbuncle, Folliculitis       CHLORTHALIDONE PO      Take 25 mg by mouth daily        ibuprofen 600 MG tablet    ADVIL/MOTRIN    30 tablet    Take 1 tablet (600 mg) by mouth every 6 hours as needed for moderate pain

## 2018-07-17 ENCOUNTER — MYC MEDICAL ADVICE (OUTPATIENT)
Dept: UROLOGY | Facility: CLINIC | Age: 33
End: 2018-07-17

## 2018-07-17 DIAGNOSIS — Z87.442 HISTORY OF NEPHROLITHIASIS: ICD-10-CM

## 2018-07-17 DIAGNOSIS — R10.9 FLANK PAIN: Primary | ICD-10-CM

## 2018-07-19 ENCOUNTER — HOSPITAL ENCOUNTER (OUTPATIENT)
Dept: CT IMAGING | Facility: CLINIC | Age: 33
Discharge: HOME | End: 2018-07-19
Attending: PHYSICIAN ASSISTANT | Admitting: PHYSICIAN ASSISTANT
Payer: COMMERCIAL

## 2018-07-19 DIAGNOSIS — N83.201 CYST OF RIGHT OVARY: Primary | ICD-10-CM

## 2018-07-19 DIAGNOSIS — R10.9 FLANK PAIN: ICD-10-CM

## 2018-07-19 DIAGNOSIS — Z87.442 HISTORY OF NEPHROLITHIASIS: ICD-10-CM

## 2018-07-19 PROCEDURE — 74176 CT ABD & PELVIS W/O CONTRAST: CPT

## 2018-07-24 ENCOUNTER — HOSPITAL ENCOUNTER (OUTPATIENT)
Dept: ULTRASOUND IMAGING | Facility: CLINIC | Age: 33
Discharge: HOME | End: 2018-07-24
Attending: PHYSICIAN ASSISTANT | Admitting: PHYSICIAN ASSISTANT
Payer: COMMERCIAL

## 2018-07-24 DIAGNOSIS — N83.201 CYST OF RIGHT OVARY: ICD-10-CM

## 2018-07-24 PROCEDURE — 76856 US EXAM PELVIC COMPLETE: CPT

## 2018-08-03 ENCOUNTER — MYC MEDICAL ADVICE (OUTPATIENT)
Dept: INTERNAL MEDICINE | Facility: CLINIC | Age: 33
End: 2018-08-03

## 2018-08-22 ENCOUNTER — OFFICE VISIT (OUTPATIENT)
Dept: INTERNAL MEDICINE | Facility: CLINIC | Age: 33
End: 2018-08-22
Payer: COMMERCIAL

## 2018-08-22 VITALS
HEIGHT: 69 IN | WEIGHT: 275.2 LBS | HEART RATE: 82 BPM | SYSTOLIC BLOOD PRESSURE: 131 MMHG | BODY MASS INDEX: 40.76 KG/M2 | OXYGEN SATURATION: 98 % | DIASTOLIC BLOOD PRESSURE: 88 MMHG | TEMPERATURE: 98 F

## 2018-08-22 DIAGNOSIS — R11.0 NAUSEA: ICD-10-CM

## 2018-08-22 DIAGNOSIS — M54.50 LUMBAR SPINE PAIN: Primary | ICD-10-CM

## 2018-08-22 DIAGNOSIS — N94.6 DYSMENORRHEA: ICD-10-CM

## 2018-08-22 PROBLEM — E66.01 MORBID OBESITY (H): Status: ACTIVE | Noted: 2018-08-22

## 2018-08-22 LAB — BETA HCG QUAL IFA URINE: NEGATIVE

## 2018-08-22 PROCEDURE — 99214 OFFICE O/P EST MOD 30 MIN: CPT | Performed by: INTERNAL MEDICINE

## 2018-08-22 PROCEDURE — 36415 COLL VENOUS BLD VENIPUNCTURE: CPT | Performed by: INTERNAL MEDICINE

## 2018-08-22 PROCEDURE — 80053 COMPREHEN METABOLIC PANEL: CPT | Performed by: INTERNAL MEDICINE

## 2018-08-22 PROCEDURE — 84703 CHORIONIC GONADOTROPIN ASSAY: CPT | Performed by: INTERNAL MEDICINE

## 2018-08-22 RX ORDER — LORAZEPAM 0.5 MG/1
0.25-0.5 TABLET ORAL
Qty: 4 TABLET | Refills: 0 | Status: SHIPPED | OUTPATIENT
Start: 2018-08-22 | End: 2019-01-31

## 2018-08-22 NOTE — PROGRESS NOTES
SUBJECTIVE:   Georgette Sanchez is a 33 year old female who presents to clinic today for the following health issues:      Back Pain       The patient has had 1.5 months of low middle back pain. The pain is constant.    The pain is 6/10.  The pain radiates to 10/10 at times.  This radiates madeline both legs.  No weakness or numbness/tingling.   She lifts patients at work, but does not remember a specific time.  Has not seen physical therapy in the past.   She feels better when she is standing.     The patient had a CT scan revealing a nonobstructing stone.   She also had an ovarian cyst.  The ovarian cyst was not seen on ultrasound.     She has had intermittent nausea for the past couple of months.  No epigastric pain.  Nausea is throughout the day.  She has been taking more ibuprofen.  She is sexually active and not on birth control.   LMP Aug 22.            Duration: 1  1/2 month        Specific cause: none    Description:   Location of pain: low back Middle  Character of pain: cramping and constant  Pain radiation:radiates into the right buttocks, radiates into the right leg, radiates into the left buttocks and radiates into the left leg  New numbness or weakness in legs, not attributed to pain:  no     Intensity: Currently 6/10    History:   Pain interferes with job: YES,   History of back problems: recurrent self limited episodes of low back pain in the past  Any previous MRI or X-rays: Yes- at [facility].  Date 7/19/18  Sees a specialist for back pain:  No  Therapies tried without relief: Ibuprofen and heat    Alleviating factors:   Improved by: heat      Precipitating factors:  Worsened by: Sitting      Problem list and histories reviewed & adjusted, as indicated.      Reviewed and updated as needed this visit by clinical staff  Tobacco  Allergies  Meds  Med Hx  Surg Hx  Fam Hx  Soc Hx      Reviewed and updated as needed this visit by Provider         ROS:  CONSTITUTIONAL: NEGATIVE for fever, chills, change  "in weight  RESP: NEGATIVE for significant cough or SOB  CV: NEGATIVE for chest pain, palpitations or peripheral edema  MSK: back pain  Neuro: no weakness or loss of bowel or bladder     OBJECTIVE:     /88 (BP Location: Left arm, Patient Position: Chair, Cuff Size: Adult Regular)  Pulse 82  Temp 98  F (36.7  C) (Oral)  Ht 5' 9.2\" (1.758 m)  Wt 275 lb 3.2 oz (124.8 kg)  LMP 08/22/2018 (Exact Date)  SpO2 98%  Breastfeeding? No  BMI 40.41 kg/m2  Body mass index is 40.41 kg/(m^2).  GENERAL: healthy, alert and no distress  RESP: lungs clear to auscultation - no rales, rhonchi or wheezes  CV: regular rate and rhythm, normal S1 S2, no S3 or S4, no murmur, click or rub  MSK: no pain upon palpation of lumbar spine or paraspinal muscles  Neuro: neg straight leg raise; strength 5/5 of lower extremities; reflexes intact    ASSESSMENT/PLAN:       (M54.5) Lumbar spine pain  (primary encounter diagnosis)  Comment: assess for lumbar stenosis  Plan: MR Lumbar Spine w/o Contrast, LORazepam         (ATIVAN) 0.5 MG tablet        -consider spine specialists versus physical therapy pending results    (R11.0) Nausea  Comment: assess for pregnancy; possibly secondary to pain  Plan: Beta HCG Qual, Urine - [facility] and [facility]         ([id number]), Comprehensive metabolic panel,         OB/GYN REFERRAL            (N94.6) Dysmenorrhea  Comment: chronic  Plan: OB/GYN REFERRAL              Krystle Suárez MD  [facility]    "

## 2018-08-23 LAB
ALBUMIN SERPL-MCNC: 4.1 G/DL (ref 3.4–5)
ALP SERPL-CCNC: 60 U/L (ref 40–150)
ALT SERPL W P-5'-P-CCNC: 21 U/L (ref 0–50)
ANION GAP SERPL CALCULATED.3IONS-SCNC: 8 MMOL/L (ref 3–14)
AST SERPL W P-5'-P-CCNC: 11 U/L (ref 0–45)
BILIRUB SERPL-MCNC: 0.6 MG/DL (ref 0.2–1.3)
BUN SERPL-MCNC: 15 MG/DL (ref 7–30)
CALCIUM SERPL-MCNC: 9 MG/DL (ref 8.5–10.1)
CHLORIDE SERPL-SCNC: 109 MMOL/L (ref 94–109)
CO2 SERPL-SCNC: 24 MMOL/L (ref 20–32)
CREAT SERPL-MCNC: 0.73 MG/DL (ref 0.52–1.04)
GFR SERPL CREATININE-BSD FRML MDRD: >90 ML/MIN/1.7M2
GLUCOSE SERPL-MCNC: 83 MG/DL (ref 70–99)
POTASSIUM SERPL-SCNC: 4.2 MMOL/L (ref 3.4–5.3)
PROT SERPL-MCNC: 7.5 G/DL (ref 6.8–8.8)
SODIUM SERPL-SCNC: 141 MMOL/L (ref 133–144)

## 2018-08-30 ENCOUNTER — HOSPITAL ENCOUNTER (OUTPATIENT)
Dept: MRI IMAGING | Facility: CLINIC | Age: 33
Discharge: HOME | End: 2018-08-30
Attending: INTERNAL MEDICINE | Admitting: INTERNAL MEDICINE
Payer: COMMERCIAL

## 2018-08-30 DIAGNOSIS — M54.50 LUMBAR SPINE PAIN: ICD-10-CM

## 2018-08-30 PROCEDURE — 72148 MRI LUMBAR SPINE W/O DYE: CPT

## 2018-09-06 ENCOUNTER — MYC MEDICAL ADVICE (OUTPATIENT)
Dept: INTERNAL MEDICINE | Facility: CLINIC | Age: 33
End: 2018-09-06

## 2018-09-07 ENCOUNTER — MYC MEDICAL ADVICE (OUTPATIENT)
Dept: INTERNAL MEDICINE | Facility: CLINIC | Age: 33
End: 2018-09-07

## 2018-09-07 DIAGNOSIS — M54.50 LUMBAR SPINE PAIN: Primary | ICD-10-CM

## 2018-09-07 NOTE — TELEPHONE ENCOUNTER
1. Called pt and discussed MRI results-she still has a lot of questions because she was reading that some of the things seen on the MRI (like the endplate edema) could lead to spinal stenosis , and the disc she has bulging could lead to a disc rupturing. She wonders if these are things she would need to be concerned about or watch out for? Or what she can reasonably expect?    2.She also wonders about ibuprofen usage for this back pain? She already takes high amounts during the week she has her menstrual cycle (up to 800mg TID), and she remembers Dr. Suárez telling her it would not be good to take high doses for a long time. She is also asking because she is not currently pregnant at this time, but she is trying to get pregnant, and she has read that this could cause an increased risk of miscarriage.     3. She also wonders what her next step should be? She is still having a lot of back pain.     Routed to Dr. Suárez.

## 2018-09-10 NOTE — TELEPHONE ENCOUNTER
Recommend that she see a spine specialist.    Recommend trying tylenol and heat and/or ice.  Also, spine specialist may refer to physical therapy.     Ibuprofen may increase risk of miscarriage.

## 2018-09-18 ENCOUNTER — OFFICE VISIT (OUTPATIENT)
Dept: ORTHOPEDICS | Facility: CLINIC | Age: 33
End: 2018-09-18
Payer: COMMERCIAL

## 2018-09-18 VITALS
WEIGHT: 275 LBS | SYSTOLIC BLOOD PRESSURE: 134 MMHG | HEIGHT: 69 IN | BODY MASS INDEX: 40.73 KG/M2 | DIASTOLIC BLOOD PRESSURE: 82 MMHG

## 2018-09-18 DIAGNOSIS — M51.369 BULGE OF LUMBAR DISC WITHOUT MYELOPATHY: Primary | ICD-10-CM

## 2018-09-18 PROCEDURE — 99204 OFFICE O/P NEW MOD 45 MIN: CPT | Performed by: FAMILY MEDICINE

## 2018-09-18 RX ORDER — METAXALONE 800 MG/1
800 TABLET ORAL 2 TIMES DAILY PRN
Qty: 30 TABLET | Refills: 1 | Status: SHIPPED | OUTPATIENT
Start: 2018-09-18 | End: 2019-01-31

## 2018-09-18 RX ORDER — CELECOXIB 200 MG/1
200 CAPSULE ORAL 2 TIMES DAILY PRN
Qty: 60 CAPSULE | Refills: 1 | Status: SHIPPED | OUTPATIENT
Start: 2018-09-18 | End: 2019-01-31

## 2018-09-18 NOTE — LETTER
9/18/2018         RE: Georgette Sanchez  [address]  [place] [address]        Dear Colleague,    Thank you for referring your patient, Georgette Sanchez, to the [facility]. Please see a copy of my visit note below.    ASSESSMENT & PLAN  Patient Instructions     1. Bulge of lumbar disc without myelopathy      -Patient has lower back pain due to a bulging L4-L5 disc  -MRI of the lumbar spine was reviewed with the patient and all questions were answered.  -Patient will start formal physical therapy and home exercise program.  -Patient will start Celebrex and Skelaxin as needed for pain and spasm.  If she continues to be pain, to consider tramadol for breakthrough pain.  -Patient will be placed on light duty at work until she is medically cleared.  Patient will follow-up in 4 weeks time to assess ability to return back to full duty  -Call direct clinic number [[phone]] at any time with questions or concerns.    Albert Yeo MD [facility]  [facility]          -----    SUBJECTIVE  Georgette Sanchez is a/an 33 year old female who is seen in consultation at the request of  Krystle Suárez M.D. for evaluation of low back pain. The patient is seen by themselves.    Onset: 3 month(s) ago. Reports insidious onset without acute precipitating event.  Location of Pain: bilateral low back with pain radiating down the gluteus muscles and anterior thighs  Rating of Pain at worst: 10/10  Rating of Pain Currently: 5/10  Worsened by: prolonged sitting  Better with: nothing  Treatments tried: rest/activity avoidance, heat, Tylenol, ibuprofen and other medications: [facility]  Quality: aching, shooting pricking, stiffness  Red flags: Weakness: Yes, bowel/bladder loss: Yes - patient states she lost control of bowel function once in August 2018 - unsure why, foot drop: No  Associated symptoms: no distal numbness or tingling; denies  "swelling or warmth  Orthopedic history: YES - lower back pain on and off for \"many years\"  Relevant surgical history: NO  Social History: patient works as a nurse in the cardiology department    Past Medical History:   Diagnosis Date     Fractured tibia 6/2015      Kidney stones      Social History     Social History     Marital status: Single     Spouse name: N/A     Number of children: N/A     Years of education: N/A     Social History Main Topics     Smoking status: Former Smoker     Types: Cigarettes     Smokeless tobacco: Never Used     Alcohol use 0.0 oz/week     0 Standard drinks or equivalent per week      Comment: socially     Drug use: No     Sexual activity: Yes     Partners: Male     Other Topics Concern     Not on file     Social History Narrative         Patient's past medical, surgical, social, and family histories were reviewed today and no changes are noted.    REVIEW OF SYSTEMS:  10 point ROS is negative other than symptoms noted above in HPI, Past Medical History or as stated below  Constitutional: NEGATIVE for fever, chills, change in weight  Skin: NEGATIVE for worrisome rashes, moles or lesions  GI/: NEGATIVE for bowel or bladder changes  Neuro: NEGATIVE for weakness, dizziness or paresthesias    OBJECTIVE:  /82 (BP Location: Right arm, Patient Position: Sitting, Cuff Size: Adult Large)  Ht 5' 9.2\" (1.758 m)  Wt 275 lb (124.7 kg)  LMP 08/22/2018 (Exact Date)  BMI 40.38 kg/m2   General: healthy, alert and in no distress  HEENT: no scleral icterus or conjunctival erythema  Skin: no suspicious lesions or rash. No jaundice.  CV: no pedal edema  Resp: normal respiratory effort without conversational dyspnea   Psych: normal mood and affect  Gait: normal steady gait with appropriate coordination and balance  Neuro: normal light touch sensory exam of the bilateral lower extremities.  DTR's 2+ patella and achilles bilaterally.  MSK:  THORACIC/LUMBAR SPINE  Inspection:    No gross " deformity/asymmetry  Palpation:    landmarks are nontender.  Range of Motion:     Lumbar flexion limited substantially by pain    Lumbar extension limited slightly by pain  Strength:    Full strength throughout hips/quads/hamstrings  Special Tests:    Positive: None    Negative: straight leg raise (bilateral), slump test (bilateral), femoral stretch test (bilateral)      Independent visualization of the below image:  Recent Results (from the past 744 hour(s))   MR Lumbar Spine w/o Contrast    Narrative    MRI LUMBAR SPINE WITHOUT CONTRAST   8/30/2018 12:44 PM     HISTORY:  Lumbar spine pain.    TECHNIQUE: Multiplanar multisequence MRI of the lumbar spine without  contrast.    COMPARISON: None.     FINDINGS:   Alignment is maintained. Bone marrow demonstrates  degenerative endplate edema which is mild most conspicuous surrounding  the L4-L5 intervertebral disc joint. Mild disc height loss is present  which is most conspicuous at L3-L4, L4-L5, and L5-S1 with additional  details below. Mild facet arthropathy is present throughout, most  conspicuous at L3-L4, L4-L5, and L5-S1 where there is fluid within the  bilateral facet joints. The conus medullaris and cauda equina are  unremarkable. Paraspinal soft tissues are unremarkable.    Segmental Analysis:     T12-L1:  No significant spinal canal or foraminal stenosis.     L1-L2:  No significant spinal canal or foraminal stenosis.      L2-L3:  No significant spinal canal or foraminal stenosis.      L3-L4:  Broad-based disc bulge. No foraminal or spinal canal stenosis.       L4-L5:  Broad-based disc bulge results in mild bilateral lateral  recess narrowing with possible contact of the traversing, left more  conspicuous than right, L5 nerve roots within the lateral recesses. No  foraminal or spinal canal stenosis.      L5-S1:  No foraminal or spinal canal stenosis.      Impression    IMPRESSION:   Mild degenerative change.    ERIC J HARTMAN, MD Albert Yeo MD  [facility]  [facility]      Again, thank you for allowing me to participate in the care of your patient.        Sincerely,        Albert Yeo, MD

## 2018-09-18 NOTE — MR AVS SNAPSHOT
After Visit Summary   9/18/2018    Georgette Sanchez    MRN: [id number]           Patient Information     Date Of Birth          1985        Visit Information        Provider Department      9/18/2018 11:20 AM Yeo, Albert, MD [facility]        Today's Diagnoses     Bulge of lumbar disc without myelopathy    -  1      Care Instructions    1. Bulge of lumbar disc without myelopathy      -Patient has lower back pain due to a bulging L4-L5 disc  -Patient will start formal physical therapy and home exercise program.  -Patient will start Celebrex and Skelaxin as needed for pain and spasm.  If she continues to be pain, to consider tramadol for breakthrough pain.  -Patient will be placed on light duty at work until she is medically cleared.  Patient will follow-up in 4 weeks time to assess ability to return back to full duty  -Call direct clinic number [[phone]] at any time with questions or concerns.    Albert Yeo MD [facility] and Sports Medicine  [facility]                Follow-ups after your visit        Additional Services     [facility] PT, HAND, AND CHIROPRACTIC REFERRAL       **This order will print in the [facility] Scheduling Office**    Physical Therapy, Hand Therapy and Chiropractic Care are available through:    *[facility]  *[facility]  *[facility]    Call one number to schedule at any of the above locations: [phone].    Your provider has referred you to: Physical Therapy at [facility] or [facility]    Indication/Reason for Referral: Low Back Pain  Onset of Illness:   Therapy Orders: Evaluate and Treat  Special Programs: None  Special Request: None    Frankie David      Additional Comments for the Therapist or Chiropractor:     Please be aware that coverage of these services is subject to the terms and limitations of your health insurance plan.  Call member services at your health plan with any  "benefit or coverage questions.      Please bring the following to your appointment:    *Your personal calendar for scheduling future appointments  *Comfortable clothing                  Who to contact     If you have questions or need follow up information about today's clinic visit or your schedule please contact [facility]IC[facility] directly at [phone].  Normal or non-critical lab and imaging results will be communicated to you by MyChart, letter or phone within 4 business days after the clinic has received the results. If you do not hear from us within 7 days, please contact the clinic through [employer] or phone. If you have a critical or abnormal lab result, we will notify you by phone as soon as possible.  Submit refill requests through [employer] or call your pharmacy and they will forward the refill request to us. Please allow 3 business days for your refill to be completed.          Additional Information About Your Visit        MyChart Information     [employer] gives you secure access to your electronic health record. If you see a primary care provider, you can also send messages to your care team and make appointments. If you have questions, please call your primary care clinic.  If you do not have a primary care provider, please call [phone] and they will assist you.        Care EveryWhere ID     This is your Care EveryWhere ID. This could be used by other organizations to access your [facility] medical records  [id number]        Your Vitals Were     Height Last Period BMI (Body Mass Index)             5' 9.2\" (1.758 m) 08/22/2018 (Exact Date) 40.38 kg/m2          Blood Pressure from Last 3 Encounters:   09/18/18 134/82   08/22/18 131/88   06/04/18 120/80    Weight from Last 3 Encounters:   09/18/18 275 lb (124.7 kg)   08/22/18 275 lb 3.2 oz (124.8 kg)   06/04/18 263 lb 3.2 oz (119.4 kg)              We Performed the Following     [facility] PT, HAND, AND CHIROPRACTIC REFERRAL          Today's " Medication Changes          These changes are accurate as of 9/18/18 12:07 PM.  If you have any questions, ask your nurse or doctor.               Start taking these medicines.        Dose/Directions    celecoxib 200 MG capsule   Commonly known as:  celeBREX   Used for:  Bulge of lumbar disc without myelopathy   Started by:  Yeo, Albert, MD        Dose:  200 mg   Take 1 capsule (200 mg) by mouth 2 times daily as needed for moderate pain   Quantity:  60 capsule   Refills:  1       metaxalone 800 MG tablet   Commonly known as:  SKELAXIN   Used for:  Bulge of lumbar disc without myelopathy   Started by:  Yeo, Albert, MD        Dose:  800 mg   Take 1 tablet (800 mg) by mouth 2 times daily as needed for moderate pain   Quantity:  30 tablet   Refills:  1            Where to get your medicines      These medications were sent to [facility] [place] [address] [place] [address] [address]     Phone:  [phone]     celecoxib 200 MG capsule    metaxalone 800 MG tablet                Primary Care Provider Fax #    Physician No Ref-Primary [phone]       No address on file        Equal Access to Services     [facility]: Hadrhys caraballo Somaureen, waaxda luqadaha, qaybta kaalmada thanh, amira yuan . So [facility] [phone].    ATENCIÓN: Si habla español, tiene a watts disposición servicios gratuitos de asistencia lingüística. Blaine al [phone].    We comply with applicable federal civil rights laws and [place] laws. We do not discriminate on the basis of race, color, national origin, age, disability, sex, sexual orientation, or gender identity.            Thank you!     Thank you for choosing [facility]  for your care. Our goal is always to provide you with excellent care. Hearing back from our patients is one way we can continue to improve our services. Please take a few minutes to complete the written  survey that you may receive in the mail after your visit with us. Thank you!             Your Updated Medication List - Protect others around you: Learn how to safely use, store and throw away your medicines at www.disposemymeds.org.          This list is accurate as of 9/18/18 12:07 PM.  Always use your most recent med list.                   Brand Name Dispense Instructions for use Diagnosis    celecoxib 200 MG capsule    celeBREX    60 capsule    Take 1 capsule (200 mg) by mouth 2 times daily as needed for moderate pain    Bulge of lumbar disc without myelopathy       cephALEXin 500 MG capsule    KEFLEX    20 capsule    Take 1 capsule (500 mg) by mouth 2 times daily    Carbuncle, Folliculitis       CHLORTHALIDONE PO      Take 25 mg by mouth daily        ibuprofen 600 MG tablet    ADVIL/MOTRIN    30 tablet    Take 1 tablet (600 mg) by mouth every 6 hours as needed for moderate pain        LORazepam 0.5 MG tablet    ATIVAN    4 tablet    Take 0.5-1 tablets (0.25-0.5 mg) by mouth once as needed for anxiety Prior to procedure    Lumbar spine pain       metaxalone 800 MG tablet    SKELAXIN    30 tablet    Take 1 tablet (800 mg) by mouth 2 times daily as needed for moderate pain    Bulge of lumbar disc without myelopathy

## 2018-09-18 NOTE — PATIENT INSTRUCTIONS
1. Bulge of lumbar disc without myelopathy      -Patient has lower back pain due to a bulging L4-L5 disc  -Patient will start formal physical therapy and home exercise program.  -Patient will start Celebrex and Skelaxin as needed for pain and spasm.  If she continues to be pain, to consider tramadol for breakthrough pain.  -Patient will be placed on light duty at work until she is medically cleared.  Patient will follow-up in 4 weeks time to assess ability to return back to full duty  -Call direct clinic number [[phone]] at any time with questions or concerns.    Albert Yeo MD [facility]  [facility] [place]

## 2018-09-18 NOTE — PROGRESS NOTES
"ASSESSMENT & PLAN  Patient Instructions     1. Bulge of lumbar disc without myelopathy      -Patient has lower back pain due to a bulging L4-L5 disc  -MRI of the lumbar spine was reviewed with the patient and all questions were answered.  -Patient will start formal physical therapy and home exercise program.  -Patient will start Celebrex and Skelaxin as needed for pain and spasm.  If she continues to be pain, to consider tramadol for breakthrough pain.  -Patient will be placed on light duty at work until she is medically cleared.  Patient will follow-up in 4 weeks time to assess ability to return back to full duty  -Call direct clinic number [[phone]] at any time with questions or concerns.    Albert Yeo MD [facility] Orthopedics and Sports Medicine  [facility]          -----    SUBJECTIVE  Georgette Sanchez is a/an 33 year old female who is seen in consultation at the request of  Krystle Suárez M.D. for evaluation of low back pain. The patient is seen by themselves.    Onset: 3 month(s) ago. Reports insidious onset without acute precipitating event.  Location of Pain: bilateral low back with pain radiating down the gluteus muscles and anterior thighs  Rating of Pain at worst: 10/10  Rating of Pain Currently: 5/10  Worsened by: prolonged sitting  Better with: nothing  Treatments tried: rest/activity avoidance, heat, Tylenol, ibuprofen and other medications: [facility]  Quality: aching, shooting pricking, stiffness  Red flags: Weakness: Yes, bowel/bladder loss: Yes - patient states she lost control of bowel function once in August 2018 - unsure why, foot drop: No  Associated symptoms: no distal numbness or tingling; denies swelling or warmth  Orthopedic history: YES - lower back pain on and off for \"many years\"  Relevant surgical history: NO  Social History: patient works as a nurse in the cardiology department    Past Medical History:   Diagnosis Date     Fractured tibia " "6/2015      Kidney stones      Social History     Social History     Marital status: Single     Spouse name: N/A     Number of children: N/A     Years of education: N/A     Social History Main Topics     Smoking status: Former Smoker     Types: Cigarettes     Smokeless tobacco: Never Used     Alcohol use 0.0 oz/week     0 Standard drinks or equivalent per week      Comment: socially     Drug use: No     Sexual activity: Yes     Partners: Male     Other Topics Concern     Not on file     Social History Narrative         Patient's past medical, surgical, social, and family histories were reviewed today and no changes are noted.    REVIEW OF SYSTEMS:  10 point ROS is negative other than symptoms noted above in HPI, Past Medical History or as stated below  Constitutional: NEGATIVE for fever, chills, change in weight  Skin: NEGATIVE for worrisome rashes, moles or lesions  GI/: NEGATIVE for bowel or bladder changes  Neuro: NEGATIVE for weakness, dizziness or paresthesias    OBJECTIVE:  /82 (BP Location: Right arm, Patient Position: Sitting, Cuff Size: Adult Large)  Ht 5' 9.2\" (1.758 m)  Wt 275 lb (124.7 kg)  LMP 08/22/2018 (Exact Date)  BMI 40.38 kg/m2   General: healthy, alert and in no distress  HEENT: no scleral icterus or conjunctival erythema  Skin: no suspicious lesions or rash. No jaundice.  CV: no pedal edema  Resp: normal respiratory effort without conversational dyspnea   Psych: normal mood and affect  Gait: normal steady gait with appropriate coordination and balance  Neuro: normal light touch sensory exam of the bilateral lower extremities.  DTR's 2+ patella and achilles bilaterally.  MSK:  THORACIC/LUMBAR SPINE  Inspection:    No gross deformity/asymmetry  Palpation:    landmarks are nontender.  Range of Motion:     Lumbar flexion limited substantially by pain    Lumbar extension limited slightly by pain  Strength:    Full strength throughout hips/quads/hamstrings  Special Tests:    Positive: " None    Negative: straight leg raise (bilateral), slump test (bilateral), femoral stretch test (bilateral)      Independent visualization of the below image:  Recent Results (from the past 744 hour(s))   MR Lumbar Spine w/o Contrast    Narrative    MRI LUMBAR SPINE WITHOUT CONTRAST   8/30/2018 12:44 PM     HISTORY:  Lumbar spine pain.    TECHNIQUE: Multiplanar multisequence MRI of the lumbar spine without  contrast.    COMPARISON: None.     FINDINGS:   Alignment is maintained. Bone marrow demonstrates  degenerative endplate edema which is mild most conspicuous surrounding  the L4-L5 intervertebral disc joint. Mild disc height loss is present  which is most conspicuous at L3-L4, L4-L5, and L5-S1 with additional  details below. Mild facet arthropathy is present throughout, most  conspicuous at L3-L4, L4-L5, and L5-S1 where there is fluid within the  bilateral facet joints. The conus medullaris and cauda equina are  unremarkable. Paraspinal soft tissues are unremarkable.    Segmental Analysis:     T12-L1:  No significant spinal canal or foraminal stenosis.     L1-L2:  No significant spinal canal or foraminal stenosis.      L2-L3:  No significant spinal canal or foraminal stenosis.      L3-L4:  Broad-based disc bulge. No foraminal or spinal canal stenosis.       L4-L5:  Broad-based disc bulge results in mild bilateral lateral  recess narrowing with possible contact of the traversing, left more  conspicuous than right, L5 nerve roots within the lateral recesses. No  foraminal or spinal canal stenosis.      L5-S1:  No foraminal or spinal canal stenosis.      Impression    IMPRESSION:   Mild degenerative change.    ERIC J HARTMAN, MD Albert Yeo MD [facility]

## 2018-09-18 NOTE — LETTER
September 18, 2018      Georgette Sanchez  [address]   [place] [address]        To Whom It May Concern:    Georgette Sanchez  was seen on 9/18/18. Patient will be placed on light duty until medically cleared.        Sincerely,        Albert Yeo, MD

## 2018-09-19 ENCOUNTER — TELEPHONE (OUTPATIENT)
Dept: ORTHOPEDICS | Facility: CLINIC | Age: 33
End: 2018-09-19

## 2018-09-20 NOTE — TELEPHONE ENCOUNTER
Patient picked up forms from clinic today. Forms were also faxed to [facility] Team @ [phone].    Mandy Hicks ATC, ATR

## 2018-09-20 NOTE — TELEPHONE ENCOUNTER
Returned patient call. Patient states that a workability form was faxed to us. Requested form be completed and faxed back to her work. Stated that we would work on the form and should have it done by the end of the day today. All questions were answered.    Mandy Hicks, LUZ, ATR

## 2018-09-20 NOTE — TELEPHONE ENCOUNTER
Reason for Call:  Form, our goal is to have forms completed with 7 days, however, some forms may require a visit or additional information.    Type of letter, form or note:  Workability Form    Who is the form from?: [facility] Accommodations Team    Where did the form come from: form was faxed in    Phone number of person requesting form: [phone] (patient)  Can we leave a detailed message on this number:  NO consent on file    Desired completion date of form: as soon as possible      How will form be returned?:  fax to [facility] Absence Management and Accommodations Team     Has the patient signed a consent form for release of information (may be included with form)? Not Applicable    Additional comments:     Form was started and place in Provider Basket for provider review/ completion at [facility].

## 2018-09-20 NOTE — TELEPHONE ENCOUNTER
Spoke with patient.  States she did see a spine specialist on 9-18-18 and things are taken care of.

## 2018-10-03 ENCOUNTER — THERAPY VISIT (OUTPATIENT)
Dept: PHYSICAL THERAPY | Facility: CLINIC | Age: 33
End: 2018-10-03
Payer: COMMERCIAL

## 2018-10-03 DIAGNOSIS — M54.50 BILATERAL LOW BACK PAIN WITHOUT SCIATICA: Primary | ICD-10-CM

## 2018-10-03 PROCEDURE — 97110 THERAPEUTIC EXERCISES: CPT | Mod: GP | Performed by: PHYSICAL THERAPIST

## 2018-10-03 PROCEDURE — 97161 PT EVAL LOW COMPLEX 20 MIN: CPT | Mod: GP | Performed by: PHYSICAL THERAPIST

## 2018-10-03 NOTE — PROGRESS NOTES
[facility] Initial Evaluation  Georgette Sanchez is a 33 year old  female referred to physical therapy by Dr. Yeo for treatment of low back pain with Precautions/Restrictions/MD instructions eval and treat     Physical Therapy Initial Evaluation: Subjective History      Injury/Condition Details:  Presenting Complaint Low back pain with referred pain to bilateral glutes   Onset Timing/Date June 2018   Mechanism Insidious      Symptom Behavior Details    Primary Pain Symptoms Location: Low back pain with bilateral glute pain  Quality: heavy ache   Frequency: Constant   Worst Pain 10/10   Best Pain 1/10   Symptom Provocators Sitting, lifting, transferring   Symptom Relievers Rounding of back, medication   Time of day dependent? No   Recent symptom change? Small improvement      Prior Testing/Intervention for current condition:  Prior Tests MRI of lumbar spine indicating:  L4-L5:  Broad-based disc bulge results in mild bilateral lateral  recess narrowing with possible contact of the traversing, left more conspicuous than right, L5 nerve roots within the lateral recesses. No foraminal or spinal canal stenosis.    Prior Treatment None      Lifestyle & General Medical History:  General Health Reported by Patient good   Employment Nurse   Usual physical activities  (within past year) Walking, running, strength training   Orthopaedic history None   Notable medical history Overweight       HPI                    Objective:  Standing Alignment:        Lumbar:  Anterior pelvic tilt                           Lumbar/SI Evaluation  ROM:    AROM Lumbar:   Flexion:            75%, moderate pain  Ext:                    90%, mild pain   Side Bend:        Left:  100%, painfree    Right:  100%, painfree  Rotation:           Left:  100%, painfree    Right:  100%, painfree  Side Glide:        Left:     Right:           Lumbar Myotomes:    T12-L3 (Hip Flex):  Left: 5    Right: 5  L2-4 (Quads):  Left:  5    Right:  5  L4  (Ankle DF):  Left:  5    Right:  5    S1 (Toe Raise):  Left: 5    Right: 5        Neural Tension/Mobility:      Left side:SLR or Slump  negative.     Right side:   Slump or SLR  negative.         Spinal Segmental Conclusions:     Level: Hypo noted at L4 and L5      SI joint/Sacrum:      Provocation:  Unremarkable                                          Hip Evaluation    Hip Strength:      Extension:  Left: 4+/5  Pain:Right: 4+/5    Pain:    Abduction:  Left: 4+/5     Pain:Right: 4+/5    Pain:                                 General     ROS    Assessment/Plan:    Patient is a 33 year old female with lumbar complaints.    Patient has the following significant findings with corresponding treatment plan.                Diagnosis 1:  LBP  Pain -  manual therapy, splint/taping/bracing/orthotics, self management, education, directional preference exercise and home program  Decreased ROM/flexibility - manual therapy, therapeutic exercise, therapeutic activity and home program  Decreased joint mobility - manual therapy, therapeutic exercise and therapeutic activity  Decreased strength - therapeutic exercise, therapeutic activities and home program  Decreased proprioception - neuro re-education, therapeutic activities and home program  Impaired muscle performance - neuro re-education and home program    Therapy Evaluation Codes:   1) History comprised of:   Personal factors that impact the plan of care:      None.    Comorbidity factors that impact the plan of care are:      Overweight.     Medications impacting care: Anti-inflammatory and Muscle relaxant.  2) Examination of Body Systems comprised of:   Body structures and functions that impact the plan of care:      Lumbar spine.   Activity limitations that impact the plan of care are:      Bending, Lifting, Sitting and Working.  3) Clinical presentation characteristics are:   Stable/Uncomplicated.  4) Decision-Making    Low complexity using standardized patient assessment  instrument and/or measureable assessment of functional outcome.  Cumulative Therapy Evaluation is: Low complexity.    Previous and current functional limitations:  (See Goal Flow Sheet for this information)    Short term and Long term goals: (See Goal Flow Sheet for this information)     Communication ability:  Patient appears to be able to clearly communicate and understand verbal and written communication and follow directions correctly.  Treatment Explanation - The following has been discussed with the patient:   RX ordered/plan of care  Anticipated outcomes  Possible risks and side effects  This patient would benefit from PT intervention to resume normal activities.   Rehab potential is good.    Frequency:  1 X week, once daily  Duration:  for 6 weeks  Discharge Plan:  Achieve all LTG.  Independent in home treatment program.  Reach maximal therapeutic benefit.    Please refer to the daily flowsheet for treatment today, total treatment time and time spent performing 1:1 timed codes.

## 2018-10-10 ENCOUNTER — THERAPY VISIT (OUTPATIENT)
Dept: PHYSICAL THERAPY | Facility: CLINIC | Age: 33
End: 2018-10-10
Attending: FAMILY MEDICINE
Payer: COMMERCIAL

## 2018-10-10 DIAGNOSIS — M54.50 BILATERAL LOW BACK PAIN WITHOUT SCIATICA: ICD-10-CM

## 2018-10-10 PROCEDURE — 97112 NEUROMUSCULAR REEDUCATION: CPT | Mod: GP | Performed by: PHYSICAL THERAPIST

## 2018-10-10 PROCEDURE — 97110 THERAPEUTIC EXERCISES: CPT | Mod: GP | Performed by: PHYSICAL THERAPIST

## 2018-10-10 PROCEDURE — 97140 MANUAL THERAPY 1/> REGIONS: CPT | Mod: GP | Performed by: PHYSICAL THERAPIST

## 2018-10-17 ENCOUNTER — THERAPY VISIT (OUTPATIENT)
Dept: PHYSICAL THERAPY | Facility: CLINIC | Age: 33
End: 2018-10-17
Attending: FAMILY MEDICINE
Payer: COMMERCIAL

## 2018-10-17 DIAGNOSIS — M54.50 BILATERAL LOW BACK PAIN WITHOUT SCIATICA: ICD-10-CM

## 2018-10-17 PROCEDURE — 97112 NEUROMUSCULAR REEDUCATION: CPT | Mod: GP | Performed by: PHYSICAL THERAPIST

## 2018-10-17 PROCEDURE — 97110 THERAPEUTIC EXERCISES: CPT | Mod: GP | Performed by: PHYSICAL THERAPIST

## 2018-10-17 PROCEDURE — 97140 MANUAL THERAPY 1/> REGIONS: CPT | Mod: GP | Performed by: PHYSICAL THERAPIST

## 2018-10-18 ENCOUNTER — OFFICE VISIT (OUTPATIENT)
Dept: ORTHOPEDICS | Facility: CLINIC | Age: 33
End: 2018-10-18
Payer: COMMERCIAL

## 2018-10-18 VITALS
WEIGHT: 275 LBS | DIASTOLIC BLOOD PRESSURE: 71 MMHG | HEIGHT: 69 IN | SYSTOLIC BLOOD PRESSURE: 121 MMHG | BODY MASS INDEX: 40.73 KG/M2

## 2018-10-18 DIAGNOSIS — M51.369 BULGE OF LUMBAR DISC WITHOUT MYELOPATHY: Primary | ICD-10-CM

## 2018-10-18 PROCEDURE — 99214 OFFICE O/P EST MOD 30 MIN: CPT | Performed by: FAMILY MEDICINE

## 2018-10-18 RX ORDER — LIDOCAINE 50 MG/G
PATCH TOPICAL
Qty: 30 PATCH | Refills: 0 | Status: SHIPPED | OUTPATIENT
Start: 2018-10-18 | End: 2019-01-31

## 2018-10-18 RX ORDER — TRAMADOL HYDROCHLORIDE 50 MG/1
50 TABLET ORAL EVERY 6 HOURS PRN
Qty: 50 TABLET | Refills: 0 | Status: SHIPPED | OUTPATIENT
Start: 2018-10-18 | End: 2019-01-31

## 2018-10-18 NOTE — MR AVS SNAPSHOT
After Visit Summary   10/18/2018    Georgette Sanchez    MRN: [id number]           Patient Information     Date Of Birth          1985        Visit Information        Provider Department      10/18/2018 11:20 AM Yeo, Albert, MD [facility] MEDICINE        Today's Diagnoses     Bulge of lumbar disc without myelopathy    -  1      Care Instructions    1. Bulge of lumbar disc without myelopathy      -Patient is here for follow-up of lower back pain due to lumbar bulging disks and muscle spasms.  -Patient suffered a significant exacerbation of her injuries last week.  -Patient will continue with physical therapy and her home exercise program.  -Patient will take tramadol 50 mg as needed for severe breakthrough pain.  Patient will start Lidoderm patches to be worn throughout the day.  Patient may take Celebrex and/or her muscle relaxer for mild to moderate pain and muscle spasms.  -Patient will continue to work on light duty and we will reevaluate her work status in 4 weeks time.  -Call direct clinic number [[phone]] at any time with questions or concerns.    Albert Yeo MD [facility] Orthopedics and Sports Medicine  [facility]                Follow-ups after your visit        Who to contact     If you have questions or need follow up information about today's clinic visit or your schedule please contact [facility] directly at [phone].  Normal or non-critical lab and imaging results will be communicated to you by MyChart, letter or phone within 4 business days after the clinic has received the results. If you do not hear from us within 7 days, please contact the clinic through MyChart or phone. If you have a critical or abnormal lab result, we will notify you by phone as soon as possible.  Submit refill requests through [employer] or call your pharmacy and they will forward the refill request to us. Please allow 3 business days for your refill  "to be completed.          Additional Information About Your Visit        [email or web address] Information     [employer] gives you secure access to your electronic health record. If you see a primary care provider, you can also send messages to your care team and make appointments. If you have questions, please call your primary care clinic.  If you do not have a primary care provider, please call [phone] and they will assist you.        Care EveryWhere ID     This is your Care EveryWhere ID. This could be used by other organizations to access your [facility] medical records  [id number]        Your Vitals Were     Height BMI (Body Mass Index)                5' 9.2\" (1.758 m) 40.38 kg/m2           Blood Pressure from Last 3 Encounters:   10/18/18 121/71   09/18/18 134/82   08/22/18 131/88    Weight from Last 3 Encounters:   10/18/18 275 lb (124.7 kg)   09/18/18 275 lb (124.7 kg)   08/22/18 275 lb 3.2 oz (124.8 kg)              Today, you had the following     No orders found for display         Today's Medication Changes          These changes are accurate as of 10/18/18 11:59 AM.  If you have any questions, ask your nurse or doctor.               Start taking these medicines.        Dose/Directions    lidocaine 5 % Patch   Commonly known as:  LIDODERM   Used for:  Bulge of lumbar disc without myelopathy   Started by:  Yeo, Albert, MD        Apply up to 2 patches to painful area at once for up to 12 h within a 24 h period.  Remove after 12 hours.   Quantity:  30 patch   Refills:  0       naloxone nasal spray   Commonly known as:  NARCAN   Used for:  Bulge of lumbar disc without myelopathy   Started by:  Yeo, Albert, MD        Dose:  4 mg   Spray 1 spray (4 mg) into one nostril alternating nostrils as needed for opioid reversal every 2-3 minutes until assistance arrives   Quantity:  0.2 mL   Refills:  0       traMADol 50 MG tablet   Commonly known as:  ULTRAM   Used for:  Bulge of lumbar disc without myelopathy   Started by:  " Yeo, Albert, MD        Dose:  50 mg   Take 1 tablet (50 mg) by mouth every 6 hours as needed for severe pain   Quantity:  50 tablet   Refills:  0            Where to get your medicines      These medications were sent to [facility] [place], [place] - [address]  [address] [facility] [address]     Phone:  [phone]     lidocaine 5 % Patch    naloxone nasal spray         Some of these will need a paper prescription and others can be bought over the counter.  Ask your nurse if you have questions.     Bring a paper prescription for each of these medications     traMADol 50 MG tablet               Information about OPIOIDS     PRESCRIPTION OPIOIDS: WHAT YOU NEED TO KNOW   We gave you an opioid (narcotic) pain medicine. It is important to manage your pain, but opioids are not always the best choice. You should first try all the other options your care team gave you. Take this medicine for as short a time (and as few doses) as possible.    Some activities can increase your pain, such as bandage changes or therapy sessions. It may help to take your pain medicine 30 to 60 minutes before these activities. Reduce your stress by getting enough sleep, working on hobbies you enjoy and practicing relaxation or meditation. Talk to your care team about ways to manage your pain beyond prescription opioids.    These medicines have risks:    DO NOT drive when on new or higher doses of pain medicine. These medicines can affect your alertness and reaction times, and you could be arrested for driving under the influence (DUI). If you need to use opioids long-term, talk to your care team about driving.    DO NOT operate heavy machinery    DO NOT do any other dangerous activities while taking these medicines.    DO NOT drink any alcohol while taking these medicines.     If the opioid prescribed includes acetaminophen, DO NOT take with any other medicines that contain acetaminophen. Read all labels  carefully. Look for the word  acetaminophen  or  Tylenol.  Ask your pharmacist if you have questions or are unsure.    You can get addicted to pain medicines, especially if you have a history of addiction (chemical, alcohol or substance dependence). Talk to your care team about ways to reduce this risk.    All opioids tend to cause constipation. Drink plenty of water and eat foods that have a lot of fiber, such as fruits, vegetables, prune juice, apple juice and high-fiber cereal. Take a laxative (Miralax, milk of magnesia, Colace, Senna) if you don t move your bowels at least every other day. Other side effects include upset stomach, sleepiness, dizziness, throwing up, tolerance (needing more of the medicine to have the same effect), physical dependence and slowed breathing.    Store your pills in a secure place, locked if possible. We will not replace any lost or stolen medicine. If you don t finish your medicine, please throw away (dispose) as directed by your pharmacist. The [place] Pollution Control Agency has more information about safe disposal: [email or web address]         Primary Care Provider Fax #    Physician No Ref-Primary [phone]       No address on file        Equal Access to Services     KRISTEL BARROSO : Jevon Muniz, wanegrita singer, qaervin kaalmada thanh, amira boo. So [facility] [phone].    ATENCIÓN: Si habla español, tiene a watts disposición servicios gratuitos de asistencia lingüística. Blaine al [phone].    We comply with applicable federal civil rights laws and [place] laws. We do not discriminate on the basis of race, color, national origin, age, disability, sex, sexual orientation, or gender identity.            Thank you!     Thank you for choosing [facility]IC[facility]  for your care. Our goal is always to provide you with excellent care. Hearing back from our patients  is one way we can continue to improve our services. Please take a few minutes to complete the written survey that you may receive in the mail after your visit with us. Thank you!             Your Updated Medication List - Protect others around you: Learn how to safely use, store and throw away your medicines at www.disposemymeds.org.          This list is accurate as of 10/18/18 11:59 AM.  Always use your most recent med list.                   Brand Name Dispense Instructions for use Diagnosis    celecoxib 200 MG capsule    celeBREX    60 capsule    Take 1 capsule (200 mg) by mouth 2 times daily as needed for moderate pain    Bulge of lumbar disc without myelopathy       cephALEXin 500 MG capsule    KEFLEX    20 capsule    Take 1 capsule (500 mg) by mouth 2 times daily    Carbuncle, Folliculitis       CHLORTHALIDONE PO      Take 25 mg by mouth daily        ibuprofen 600 MG tablet    ADVIL/MOTRIN    30 tablet    Take 1 tablet (600 mg) by mouth every 6 hours as needed for moderate pain        lidocaine 5 % Patch    LIDODERM    30 patch    Apply up to 2 patches to painful area at once for up to 12 h within a 24 h period.  Remove after 12 hours.    Bulge of lumbar disc without myelopathy       LORazepam 0.5 MG tablet    ATIVAN    4 tablet    Take 0.5-1 tablets (0.25-0.5 mg) by mouth once as needed for anxiety Prior to procedure    Lumbar spine pain       metaxalone 800 MG tablet    SKELAXIN    30 tablet    Take 1 tablet (800 mg) by mouth 2 times daily as needed for moderate pain    Bulge of lumbar disc without myelopathy       naloxone nasal spray    NARCAN    0.2 mL    Spray 1 spray (4 mg) into one nostril alternating nostrils as needed for opioid reversal every 2-3 minutes until assistance arrives    Bulge of lumbar disc without myelopathy       traMADol 50 MG tablet    ULTRAM    50 tablet    Take 1 tablet (50 mg) by mouth every 6 hours as needed for severe pain    Bulge of lumbar disc without myelopathy

## 2018-10-18 NOTE — PATIENT INSTRUCTIONS
1. Bulge of lumbar disc without myelopathy      -Patient is here for follow-up of lower back pain due to lumbar bulging disks and muscle spasms.  -Patient suffered a significant exacerbation of her injuries last week.  -Patient will continue with physical therapy and her home exercise program.  -Patient will take tramadol 50 mg as needed for severe breakthrough pain.  Patient will start Lidoderm patches to be worn throughout the day.  Patient may take Celebrex and/or her muscle relaxer for mild to moderate pain and muscle spasms.  -Patient will continue to work on light duty and we will reevaluate her work status in 4 weeks time.  -Call direct clinic number [[phone]] at any time with questions or concerns.    Albert Yeo MD [facility] Orthopedics and Sports Medicine  [facility] [place]

## 2018-10-18 NOTE — PROGRESS NOTES
ASSESSMENT & PLAN  Patient Instructions     1. Bulge of lumbar disc without myelopathy      -Patient is here for follow-up of lower back pain due to lumbar bulging disks and muscle spasms.  -Patient suffered a significant exacerbation of her injuries last week.  -Patient will continue with physical therapy and her home exercise program.  -Patient will take tramadol 50 mg as needed for severe breakthrough pain.  Patient will start Lidoderm patches to be worn throughout the day.  Patient may take Celebrex and/or her muscle relaxer for mild to moderate pain and muscle spasms.  -Patient will continue to work on light duty and we will reevaluate her work status in 4 weeks time.  -Call direct clinic number [[phone]] at any time with questions or concerns.    Albert Yeo MD [facility] Orthopedics and Sports Medicine  [facility]          -----    SUBJECTIVE:  Georgette Sanchez is a 33 year old female who is seen in follow-up for low back pain.They were last seen 9/18/18.     Since their last visit reports 0% - (About the same as last time). They indicate that their current pain level is 5/10. They have tried rest/activity avoidance, other medications: celebrex, skelaxin, home exercises and physical therapy (3 visits). Have these treatments improved their pain: Patient states she was feeling better so she tried jogging on Saturday 10/13/18, which then aggravated her pain. Patient states she now feels about the same as she did at her first office visit.    The patient is seen by themselves.    Patient's past medical, surgical, social, and family histories were reviewed today and no changes are noted.    REVIEW OF SYSTEMS:  Constitutional: NEGATIVE for fever, chills, change in weight  Skin: NEGATIVE for worrisome rashes, moles or lesions  GI/: NEGATIVE for bowel or bladder changes  Neuro: NEGATIVE for weakness, dizziness or paresthesias    OBJECTIVE:  /71 (BP Location: Right arm, Patient  "Position: Sitting, Cuff Size: Adult Large)  Ht 5' 9.2\" (1.758 m)  Wt 275 lb (124.7 kg)  BMI 40.38 kg/m2   General: healthy, alert and in no distress  HEENT: no scleral icterus or conjunctival erythema  Skin: no suspicious lesions or rash. No jaundice.  CV: regular rhythm by palpation, no pedal edema  Resp: normal respiratory effort without conversational dyspnea   Psych: normal mood and affect  Gait: normal steady gait with appropriate coordination and balance  Neuro: normal light touch sensory exam of the extremities.    MSK:  THORACIC/LUMBAR SPINE  Inspection:    No gross deformity/asymmetry  Palpation:    Tender to palpation of the lower lumbar spinous processes and right paralumbar region per  Range of Motion:     Lumbar flexion limited substantially by pain    Lumbar extension limited slightly by pain  Lateral flexion and rotation slightly limited by pain as well.  Strength:    Full strength throughout hips/quads/hamstrings  Special Tests:    Positive: None    Negative: straight leg raise (bilateral), slump test (bilateral), femoral stretch test (bilateral)            Albert Yeo MD, [facility] Orthopedic Care    "

## 2018-10-18 NOTE — LETTER
10/18/2018         RE: Georgette aSnchez  [address]  [place] [address]        Dear Colleague,    Thank you for referring your patient, Georgette Sanchez, to the [facility] SP[facility]TS MEDIC[facility]. Please see a copy of my visit note below.    ASSESSMENT & PLAN  Patient Instructions     1. Bulge of lumbar disc without myelopathy      -Patient is here for follow-up of lower back pain due to lumbar bulging disks and muscle spasms.  -Patient suffered a significant exacerbation of her injuries last week.  -Patient will continue with physical therapy and her home exercise program.  -Patient will take tramadol 50 mg as needed for severe breakthrough pain.  Patient will start Lidoderm patches to be worn throughout the day.  Patient may take Celebrex and/or her muscle relaxer for mild to moderate pain and muscle spasms.  -Patient will continue to work on light duty and we will reevaluate her work status in 4 weeks time.  -Call direct clinic number [[phone]] at any time with questions or concerns.    Albert Yeo MD [facility] Orthopedics and Sports Medicine  [facility]          -----    SUBJECTIVE:  Georgette Sanchez is a 33 year old female who is seen in follow-up for low back pain.They were last seen 9/18/18.     Since their last visit reports 0% - (About the same as last time). They indicate that their current pain level is 5/10. They have tried rest/activity avoidance, other medications: celebrex, skelaxin, home exercises and physical therapy (3 visits). Have these treatments improved their pain: Patient states she was feeling better so she tried jogging on Saturday 10/13/18, which then aggravated her pain. Patient states she now feels about the same as she did at her first office visit.    The patient is seen by themselves.    Patient's past medical, surgical, social, and family histories were reviewed today and no changes are noted.    REVIEW OF SYSTEMS:  Constitutional: NEGATIVE  "for fever, chills, change in weight  Skin: NEGATIVE for worrisome rashes, moles or lesions  GI/: NEGATIVE for bowel or bladder changes  Neuro: NEGATIVE for weakness, dizziness or paresthesias    OBJECTIVE:  /71 (BP Location: Right arm, Patient Position: Sitting, Cuff Size: Adult Large)  Ht 5' 9.2\" (1.758 m)  Wt 275 lb (124.7 kg)  BMI 40.38 kg/m2   General: healthy, alert and in no distress  HEENT: no scleral icterus or conjunctival erythema  Skin: no suspicious lesions or rash. No jaundice.  CV: regular rhythm by palpation, no pedal edema  Resp: normal respiratory effort without conversational dyspnea   Psych: normal mood and affect  Gait: normal steady gait with appropriate coordination and balance  Neuro: normal light touch sensory exam of the extremities.    MSK:  THORACIC/LUMBAR SPINE  Inspection:    No gross deformity/asymmetry  Palpation:     Tender to palpation of the lower lumbar spinous processes and right paralumbar region per  Range of Motion:     Lumbar flexion limited substantially by pain    Lumbar extension limited slightly by pain  Lateral flexion and rotation slightly limited by pain as well.  Strength:    Full strength throughout hips/quads/hamstrings  Special Tests:    Positive: None    Negative: straight leg raise (bilateral), slump test (bilateral), femoral stretch test (bilateral)            Albert Yeo MD, [facility] Sports and Orthopedic Care      Again, thank you for allowing me to participate in the care of your patient.        Sincerely,        Albert Yeo, MD    "

## 2018-10-24 ENCOUNTER — THERAPY VISIT (OUTPATIENT)
Dept: PHYSICAL THERAPY | Facility: CLINIC | Age: 33
End: 2018-10-24
Payer: COMMERCIAL

## 2018-10-24 DIAGNOSIS — M54.50 BILATERAL LOW BACK PAIN WITHOUT SCIATICA: ICD-10-CM

## 2018-10-24 PROCEDURE — 97112 NEUROMUSCULAR REEDUCATION: CPT | Mod: GP | Performed by: PHYSICAL THERAPIST

## 2018-10-24 PROCEDURE — 99207 C STAT DRY NEEDLING - IAM: CPT | Mod: 25 | Performed by: PHYSICAL THERAPIST

## 2018-10-24 PROCEDURE — 97014 ELECTRIC STIMULATION THERAPY: CPT | Mod: GP | Performed by: PHYSICAL THERAPIST

## 2018-10-24 PROCEDURE — 97110 THERAPEUTIC EXERCISES: CPT | Mod: GP | Performed by: PHYSICAL THERAPIST

## 2018-10-31 ENCOUNTER — THERAPY VISIT (OUTPATIENT)
Dept: PHYSICAL THERAPY | Facility: CLINIC | Age: 33
End: 2018-10-31
Payer: COMMERCIAL

## 2018-10-31 DIAGNOSIS — M54.50 BILATERAL LOW BACK PAIN WITHOUT SCIATICA: ICD-10-CM

## 2018-10-31 PROCEDURE — 97110 THERAPEUTIC EXERCISES: CPT | Mod: GP | Performed by: PHYSICAL THERAPIST

## 2018-10-31 PROCEDURE — 97140 MANUAL THERAPY 1/> REGIONS: CPT | Mod: GP | Performed by: PHYSICAL THERAPIST

## 2018-11-03 ENCOUNTER — MYC MEDICAL ADVICE (OUTPATIENT)
Dept: INTERNAL MEDICINE | Facility: CLINIC | Age: 33
End: 2018-11-03

## 2018-11-14 ENCOUNTER — THERAPY VISIT (OUTPATIENT)
Dept: PHYSICAL THERAPY | Facility: CLINIC | Age: 33
End: 2018-11-14
Payer: COMMERCIAL

## 2018-11-14 DIAGNOSIS — M54.50 BILATERAL LOW BACK PAIN WITHOUT SCIATICA: ICD-10-CM

## 2018-11-14 PROCEDURE — 99207 C STAT DRY NEEDLING - IAM: CPT | Mod: 25 | Performed by: PHYSICAL THERAPIST

## 2018-11-14 PROCEDURE — 97032 APPL MODALITY 1+ESTIM EA 15: CPT | Mod: GP | Performed by: PHYSICAL THERAPIST

## 2018-11-14 PROCEDURE — 97110 THERAPEUTIC EXERCISES: CPT | Mod: GP | Performed by: PHYSICAL THERAPIST

## 2018-11-15 ENCOUNTER — OFFICE VISIT (OUTPATIENT)
Dept: ORTHOPEDICS | Facility: CLINIC | Age: 33
End: 2018-11-15
Payer: COMMERCIAL

## 2018-11-15 VITALS
DIASTOLIC BLOOD PRESSURE: 80 MMHG | WEIGHT: 275 LBS | SYSTOLIC BLOOD PRESSURE: 130 MMHG | BODY MASS INDEX: 40.73 KG/M2 | HEIGHT: 69 IN

## 2018-11-15 DIAGNOSIS — M51.369 BULGE OF LUMBAR DISC WITHOUT MYELOPATHY: Primary | ICD-10-CM

## 2018-11-15 PROCEDURE — 99213 OFFICE O/P EST LOW 20 MIN: CPT | Performed by: FAMILY MEDICINE

## 2018-11-15 NOTE — LETTER
11/15/2018         RE: Georgette Sanchez  [address]  [place] [address]        Dear Colleague,    Thank you for referring your patient, Georgette Sanchez, to the [facility] SP[facility]TS [facility]. Please see a copy of my visit note below.    ASSESSMENT & PLAN  Patient Instructions     1. Bulge of lumbar disc without myelopathy      -Patient is here for follow-up of lower back pain due to bulging lumbar  Disks  -Patient reports significant improvement with physical therapy and light duty at work.  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  I recommend to patient continue to see physical therapy approximately once a month for progression of exercises.  -Patient will progress activities as tolerated and follow-up as needed.  -Patient may continue to work out on her own.  I recommend TRX band exercises.  -Call direct clinic number [[phone]] at any time with questions or concerns.    Albert Yeo MD [facility] Orthopedics and Sports Medicine  [facility]          -----    SUBJECTIVE:  Georgette Sanchez is a 33 year old female who is seen in follow-up for low back pain.They were last seen 10/18/2018.     Since their last visit reports 70% improvement. They indicate that their current pain level is 1/10. They have tried rest/activity avoidance, ice, ibuprofen, home exercises, physical therapy (6 visits) and previous imaging (MRI 8/30/18). Have these treatments improved their pain: Patient states she if feeling better since her last visit. Patient states light duty, physical therapy and dry needling have helped improve her pain.    The patient is seen by themselves.    Patient's past medical, surgical, social, and family histories were reviewed today and no changes are noted.    REVIEW OF SYSTEMS:  Constitutional: NEGATIVE for fever, chills, change in weight  Skin: NEGATIVE for worrisome rashes, moles or lesions  GI/: NEGATIVE for bowel  "or bladder changes  Neuro: NEGATIVE for weakness, dizziness or paresthesias    OBJECTIVE:  /80  Ht 5' 9.2\" (1.758 m)  Wt 275 lb (124.7 kg)  BMI 40.38 kg/m2   General: healthy, alert and in no distress  HEENT: no scleral icterus or conjunctival erythema  Skin: no suspicious lesions or rash. No jaundice.  CV: regular rhythm by palpation, no pedal edema  Resp: normal respiratory effort without conversational dyspnea   Psych: normal mood and affect  Gait: normal steady gait with appropriate coordination and balance  Neuro: normal light touch sensory exam of the extremities.    MSK:  THORACIC/LUMBAR SPINE  Inspection:    No gross deformity/asymmetry  Palpation:  Mildly tender to palpation of the lower lumbar spinous processes and right paralumbar region per  Range of Motion:     Lumbar flexion slightly limited by pain and tightness    Lumbar extension within normal limits  Lateral flexion and rotation within normal limits  Strength:    Full strength throughout hips/quads/hamstrings  Special Tests:    Positive: None    Negative: straight leg raise (bilateral), slump test (bilateral), femoral stretch test (bilateral)        Albert Yeo MD, [facility] Sports and Orthopedic Care          Again, thank you for allowing me to participate in the care of your patient.        Sincerely,        Albert Yeo, MD    "

## 2018-11-15 NOTE — MR AVS SNAPSHOT
After Visit Summary   11/15/2018    Georgette Sanchez    MRN: [id number]           Patient Information     Date Of Birth          1985        Visit Information        Provider Department      11/15/2018 11:00 AM Yeo, Albert, MD [facility]        Today's Diagnoses     Bulge of lumbar disc without myelopathy    -  1      Care Instructions    1. Bulge of lumbar disc without myelopathy      -Patient is here for follow-up of lower back pain due to bulging lumbar  Disks  -Patient reports significant improvement with physical therapy and light duty at work.  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  I recommend to patient continue to see physical therapy approximately once a month for progression of exercises.  -Patient will progress activities as tolerated and follow-up as needed.  -Patient may continue to work out on her own.  I recommend TRX band exercises.  -Call direct clinic number [[phone]] at any time with questions or concerns.    Albert Yeo MD [facility] Medicine  [facility]                Follow-ups after your visit        Who to contact     If you have questions or need follow up information about today's clinic visit or your schedule please contact [facility] directly at [phone].  Normal or non-critical lab and imaging results will be communicated to you by MyChart, letter or phone within 4 business days after the clinic has received the results. If you do not hear from us within 7 days, please contact the clinic through MyChart or phone. If you have a critical or abnormal lab result, we will notify you by phone as soon as possible.  Submit refill requests through [employer] or call your pharmacy and they will forward the refill request to us. Please allow 3 business days for your refill to be completed.          Additional Information About Your  "Visit        MyChart Information     [employer] gives you secure access to your electronic health record. If you see a primary care provider, you can also send messages to your care team and make appointments. If you have questions, please call your primary care clinic.  If you do not have a primary care provider, please call [phone] and they will assist you.        Care EveryWhere ID     This is your Care EveryWhere ID. This could be used by other organizations to access your [place] medical records  [id number]        Your Vitals Were     Height BMI (Body Mass Index)                5' 9.2\" (1.758 m) 40.38 kg/m2           Blood Pressure from Last 3 Encounters:   11/15/18 130/80   10/18/18 121/71   09/18/18 134/82    Weight from Last 3 Encounters:   11/15/18 275 lb (124.7 kg)   10/18/18 275 lb (124.7 kg)   09/18/18 275 lb (124.7 kg)              Today, you had the following     No orders found for display       Primary Care Provider Fax #    Physician No Ref-Primary [phone]       No address on file        Equal Access to Services     [facility] : Hadii johana Muniz, waaxda lujesika, qaybta kaalmasuzie law, amira yuan . So [facility] [phone].    ATENCIÓN: Si habla español, tiene a watts disposición servicios gratuitos de asistencia lingüística. Blaine al [phone].    We comply with applicable federal civil rights laws and [place] laws. We do not discriminate on the basis of race, color, national origin, age, disability, sex, sexual orientation, or gender identity.            Thank you!     Thank you for choosing [facility]  for your care. Our goal is always to provide you with excellent care. Hearing back from our patients is one way we can continue to improve our services. Please take a few minutes to complete the written survey that you may receive in the mail after your visit with us. Thank you!             Your Updated Medication List - " Protect others around you: Learn how to safely use, store and throw away your medicines at www.disposemymeds.org.          This list is accurate as of 11/15/18 11:28 AM.  Always use your most recent med list.                   Brand Name Dispense Instructions for use Diagnosis    celecoxib 200 MG capsule    celeBREX    60 capsule    Take 1 capsule (200 mg) by mouth 2 times daily as needed for moderate pain    Bulge of lumbar disc without myelopathy       cephALEXin 500 MG capsule    KEFLEX    20 capsule    Take 1 capsule (500 mg) by mouth 2 times daily    Carbuncle, Folliculitis       CHLORTHALIDONE PO      Take 25 mg by mouth daily        ibuprofen 600 MG tablet    ADVIL/MOTRIN    30 tablet    Take 1 tablet (600 mg) by mouth every 6 hours as needed for moderate pain        lidocaine 5 % Patch    LIDODERM    30 patch    Apply up to 2 patches to painful area at once for up to 12 h within a 24 h period.  Remove after 12 hours.    Bulge of lumbar disc without myelopathy       LORazepam 0.5 MG tablet    ATIVAN    4 tablet    Take 0.5-1 tablets (0.25-0.5 mg) by mouth once as needed for anxiety Prior to procedure    Lumbar spine pain       metaxalone 800 MG tablet    SKELAXIN    30 tablet    Take 1 tablet (800 mg) by mouth 2 times daily as needed for moderate pain    Bulge of lumbar disc without myelopathy       naloxone nasal spray    NARCAN    0.2 mL    Spray 1 spray (4 mg) into one nostril alternating nostrils as needed for opioid reversal every 2-3 minutes until assistance arrives    Bulge of lumbar disc without myelopathy       traMADol 50 MG tablet    ULTRAM    50 tablet    Take 1 tablet (50 mg) by mouth every 6 hours as needed for severe pain    Bulge of lumbar disc without myelopathy

## 2018-11-15 NOTE — PROGRESS NOTES
"ASSESSMENT & PLAN  Patient Instructions     1. Bulge of lumbar disc without myelopathy      -Patient is here for follow-up of lower back pain due to bulging lumbar  Disks  -Patient reports significant improvement with physical therapy and light duty at work.  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  I recommend to patient continue to see physical therapy approximately once a month for progression of exercises.  -Patient will progress activities as tolerated and follow-up as needed.  -Patient may continue to work out on her own.  I recommend TRX band exercises.  -Call direct clinic number [[phone]] at any time with questions or concerns.    Albert Yeo MD [facility] Orthopedics and Sports Medicine  [facility]          -----    SUBJECTIVE:  Georgette Sanchez is a 33 year old female who is seen in follow-up for low back pain.They were last seen 10/18/2018.     Since their last visit reports 70% improvement. They indicate that their current pain level is 1/10. They have tried rest/activity avoidance, ice, ibuprofen, home exercises, physical therapy (6 visits) and previous imaging (MRI 8/30/18). Have these treatments improved their pain: Patient states she if feeling better since her last visit. Patient states light duty, physical therapy and dry needling have helped improve her pain.    The patient is seen by themselves.    Patient's past medical, surgical, social, and family histories were reviewed today and no changes are noted.    REVIEW OF SYSTEMS:  Constitutional: NEGATIVE for fever, chills, change in weight  Skin: NEGATIVE for worrisome rashes, moles or lesions  GI/: NEGATIVE for bowel or bladder changes  Neuro: NEGATIVE for weakness, dizziness or paresthesias    OBJECTIVE:  /80  Ht 5' 9.2\" (1.758 m)  Wt 275 lb (124.7 kg)  BMI 40.38 kg/m2   General: healthy, alert and in no distress  HEENT: no scleral icterus or conjunctival " erythema  Skin: no suspicious lesions or rash. No jaundice.  CV: regular rhythm by palpation, no pedal edema  Resp: normal respiratory effort without conversational dyspnea   Psych: normal mood and affect  Gait: normal steady gait with appropriate coordination and balance  Neuro: normal light touch sensory exam of the extremities.    MSK:  THORACIC/LUMBAR SPINE  Inspection:    No gross deformity/asymmetry  Palpation:  Mildly tender to palpation of the lower lumbar spinous processes and right paralumbar region per  Range of Motion:     Lumbar flexion slightly limited by pain and tightness    Lumbar extension within normal limits  Lateral flexion and rotation within normal limits  Strength:    Full strength throughout hips/quads/hamstrings  Special Tests:    Positive: None    Negative: straight leg raise (bilateral), slump test (bilateral), femoral stretch test (bilateral)        Albert Yeo MD, [facility]

## 2018-11-15 NOTE — PATIENT INSTRUCTIONS
1. Bulge of lumbar disc without myelopathy      -Patient is here for follow-up of lower back pain due to bulging lumbar  Disks  -Patient reports significant improvement with physical therapy and light duty at work.  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  I recommend to patient continue to see physical therapy approximately once a month for progression of exercises.  -Patient will progress activities as tolerated and follow-up as needed.  -Patient may continue to work out on her own.  I recommend TRX band exercises.  -Call direct clinic number [[phone]] at any time with questions or concerns.    Albert Yeo MD CAQSM  [place] [facility]  [facility]

## 2019-01-24 ENCOUNTER — OFFICE VISIT (OUTPATIENT)
Dept: FAMILY MEDICINE | Facility: CLINIC | Age: 34
End: 2019-01-24
Payer: COMMERCIAL

## 2019-01-24 VITALS
HEART RATE: 85 BPM | BODY MASS INDEX: 42.9 KG/M2 | WEIGHT: 292.2 LBS | RESPIRATION RATE: 18 BRPM | DIASTOLIC BLOOD PRESSURE: 74 MMHG | TEMPERATURE: 98.7 F | SYSTOLIC BLOOD PRESSURE: 114 MMHG | OXYGEN SATURATION: 97 %

## 2019-01-24 DIAGNOSIS — R05.9 COUGH: Primary | ICD-10-CM

## 2019-01-24 LAB
DEPRECATED S PYO AG THROAT QL EIA: NORMAL
FLUAV+FLUBV AG SPEC QL: NEGATIVE
FLUAV+FLUBV AG SPEC QL: NEGATIVE
SPECIMEN SOURCE: NORMAL
SPECIMEN SOURCE: NORMAL

## 2019-01-24 PROCEDURE — 99213 OFFICE O/P EST LOW 20 MIN: CPT | Performed by: NURSE PRACTITIONER

## 2019-01-24 PROCEDURE — 87880 STREP A ASSAY W/OPTIC: CPT | Performed by: NURSE PRACTITIONER

## 2019-01-24 PROCEDURE — 87804 INFLUENZA ASSAY W/OPTIC: CPT | Performed by: NURSE PRACTITIONER

## 2019-01-24 PROCEDURE — 87081 CULTURE SCREEN ONLY: CPT | Performed by: NURSE PRACTITIONER

## 2019-01-24 NOTE — LETTER
[facility]  [address] [address]  [phone]    2019    Re: Georgette Sanchez  [address]   [place] [address]  [phone] (home) [phone] (work)    : 1985      To Whom It May Concern:      Georgette Sanchez was seen in clinic 19.  Please excuse from work 19.      Sincerely,        Katy YUSUFP

## 2019-01-24 NOTE — PATIENT INSTRUCTIONS
Please continue to monitor symptoms.  I would recommend increasing fluids, rest, and steamy humidification.  Please return to clinic if you notice any change or increase in symptoms.      Flonase, consider claritin.

## 2019-01-24 NOTE — PROGRESS NOTES
SUBJECTIVE:   Georgette Sanchez is a 33 year old female who presents to clinic today for the following health issues:      Acute Illness   Acute illness concerns: sore throat  Onset: 4 days    Fever: no    Chills/Sweats: YES    Headache (location?): YES    Sinus Pressure:YES    Conjunctivitis:  no    Ear Pain: YES: bilateral    Rhinorrhea: YES    Congestion: YES    Sore Throat: YES     Cough: YES    Wheeze: no    Decreased Appetite: YES    Nausea: no    Vomiting: no    Diarrhea:  no    Dysuria/Freq.: no    Fatigue/Achiness: YES    Sick/Strep Exposure: no      Therapies Tried and outcome: Ibuprofen, increase fluids, OCT cold medicine     No fever.  Taking in food and fluids.  Wants to make sure this is not flu or strep.  Does not believe it is flu.      Problem list and histories reviewed & adjusted, as indicated.  Additional history: as documented    Patient Active Problem List   Diagnosis     Nephrolithiasis     Dysfunctional uterine bleeding     Family history of breast cancer     Carbuncle     Folliculitis     Morbid obesity (H)     Bilateral low back pain without sciatica     Past Surgical History:   Procedure Laterality Date     AS REDUCTION OF LARGE BREAST  2001     TONSILLECTOMY         Social History     Tobacco Use     Smoking status: Former Smoker     Types: Cigarettes     Smokeless tobacco: Never Used   Substance Use Topics     Alcohol use: Yes     Alcohol/week: 0.0 oz     Comment: socially     Family History   Problem Relation Age of Onset     Family History Negative Mother      Family History Negative Father            Reviewed and updated as needed this visit by clinical staff       Reviewed and updated as needed this visit by Provider         ROS:  SEE HPI.    OBJECTIVE:     /74 (BP Location: Right arm, Patient Position: Chair, Cuff Size: Adult Large)   Pulse 85   Temp 98.7  F (37.1  C) (Tympanic)   Resp 18   Wt 132.5 kg (292 lb 3.2 oz)   LMP 12/31/2018 (Exact Date)   SpO2 97%   BMI 42.90  kg/m    Body mass index is 42.9 kg/m .  GENERAL: healthy, alert and no distress  EYES: Eyes grossly normal to inspection  HENT: ear canals and TM's normal, nose and mouth without ulcers or lesions  NECK: no adenopathy, no asymmetry, masses, or scars and thyroid normal to palpation  RESP: lungs clear to auscultation - no rales, rhonchi or wheezes  CV: regular rates and rhythm and normal S1 S2, no S3 or S4  PSYCH: mentation appears normal, affect normal/bright    Diagnostic Test Results:  Results for orders placed or performed in visit on 01/24/19   Strep, Rapid Screen   Result Value Ref Range    Specimen Description Throat     Rapid Strep A Screen       NEGATIVE: No Group A streptococcal antigen detected by immunoassay, await culture report.   Influenza A/B antigen   Result Value Ref Range    Influenza A/B Agn Specimen Nasal     Influenza A Negative NEG^Negative    Influenza B Negative NEG^Negative   Beta strep group A culture   Result Value Ref Range    Specimen Description Throat     Culture Micro No beta hemolytic Streptococcus Group A isolated        ASSESSMENT/PLAN:   1. Cough  Negative RST and flu.  Symptomatic care, monitoring.  Return to clinic if symptoms persist or worsen.    Pt agrees with plan and verbalized understanding.  - Strep, Rapid Screen  - Influenza A/B antigen  - Beta strep group A culture    CALLI Brar [facility]

## 2019-01-25 LAB
BACTERIA SPEC CULT: NORMAL
SPECIMEN SOURCE: NORMAL

## 2019-02-26 ENCOUNTER — TELEPHONE (OUTPATIENT)
Dept: MIDWIFE SERVICES | Facility: CLINIC | Age: 34
End: 2019-02-26

## 2019-02-26 DIAGNOSIS — O36.80X0 PREGNANCY WITH INCONCLUSIVE FETAL VIABILITY: Primary | ICD-10-CM

## 2019-02-28 ENCOUNTER — ANCILLARY PROCEDURE (OUTPATIENT)
Dept: ULTRASOUND IMAGING | Facility: CLINIC | Age: 34
End: 2019-02-28
Payer: COMMERCIAL

## 2019-02-28 ENCOUNTER — RECORDS - HEALTHEAST (OUTPATIENT)
Dept: ADMINISTRATIVE | Facility: OTHER | Age: 34
End: 2019-02-28

## 2019-02-28 ENCOUNTER — PRENATAL OFFICE VISIT (OUTPATIENT)
Dept: MIDWIFE SERVICES | Facility: CLINIC | Age: 34
End: 2019-02-28
Payer: COMMERCIAL

## 2019-02-28 VITALS
DIASTOLIC BLOOD PRESSURE: 80 MMHG | WEIGHT: 288 LBS | HEART RATE: 79 BPM | SYSTOLIC BLOOD PRESSURE: 119 MMHG | HEIGHT: 70 IN | BODY MASS INDEX: 41.23 KG/M2

## 2019-02-28 DIAGNOSIS — Z86.19 HISTORY OF CHLAMYDIA: ICD-10-CM

## 2019-02-28 DIAGNOSIS — O36.80X0 PREGNANCY WITH INCONCLUSIVE FETAL VIABILITY: ICD-10-CM

## 2019-02-28 DIAGNOSIS — O99.211 OBESITY AFFECTING PREGNANCY IN FIRST TRIMESTER: ICD-10-CM

## 2019-02-28 DIAGNOSIS — Z86.19 HISTORY OF HERPES GENITALIS: ICD-10-CM

## 2019-02-28 DIAGNOSIS — Z11.8 SCREENING FOR CHLAMYDIAL DISEASE: ICD-10-CM

## 2019-02-28 DIAGNOSIS — O09.519 SUPERVISION OF HIGH-RISK PREGNANCY OF ELDERLY PRIMIGRAVIDA: Primary | ICD-10-CM

## 2019-02-28 DIAGNOSIS — Z98.890 STATUS POST BREAST REDUCTION: ICD-10-CM

## 2019-02-28 DIAGNOSIS — Z11.3 SCREENING EXAMINATION FOR VENEREAL DISEASE: ICD-10-CM

## 2019-02-28 DIAGNOSIS — O09.521 ELDERLY MULTIGRAVIDA IN FIRST TRIMESTER: ICD-10-CM

## 2019-02-28 DIAGNOSIS — Z3A.08 8 WEEKS GESTATION OF PREGNANCY: ICD-10-CM

## 2019-02-28 PROBLEM — O20.8 SUBCHORIONIC HEMORRHAGE OF PLACENTA IN FIRST TRIMESTER: Status: ACTIVE | Noted: 2019-02-28

## 2019-02-28 LAB
ABO + RH BLD: NORMAL
ABO + RH BLD: NORMAL
ALBUMIN UR-MCNC: NEGATIVE MG/DL
APPEARANCE UR: CLEAR
BILIRUB UR QL STRIP: NEGATIVE
BLD GP AB SCN SERPL QL: NORMAL
BLOOD BANK CMNT PATIENT-IMP: NORMAL
COLOR UR AUTO: YELLOW
ERYTHROCYTE [DISTWIDTH] IN BLOOD BY AUTOMATED COUNT: 11.9 % (ref 10–15)
GLUCOSE UR STRIP-MCNC: NEGATIVE MG/DL
HBA1C MFR BLD: 4.8 % (ref 0–5.6)
HCT VFR BLD AUTO: 36 % (ref 35–47)
HGB BLD-MCNC: 12.3 G/DL (ref 11.7–15.7)
HGB UR QL STRIP: NEGATIVE
KETONES UR STRIP-MCNC: ABNORMAL MG/DL
LEUKOCYTE ESTERASE UR QL STRIP: NEGATIVE
MCH RBC QN AUTO: 31.3 PG (ref 26.5–33)
MCHC RBC AUTO-ENTMCNC: 34.2 G/DL (ref 31.5–36.5)
MCV RBC AUTO: 92 FL (ref 78–100)
NITRATE UR QL: NEGATIVE
PH UR STRIP: 6 PH (ref 5–7)
PLATELET # BLD AUTO: 305 10E9/L (ref 150–450)
RBC # BLD AUTO: 3.93 10E12/L (ref 3.8–5.2)
SOURCE: ABNORMAL
SP GR UR STRIP: 1.02 (ref 1–1.03)
SPECIMEN EXP DATE BLD: NORMAL
UROBILINOGEN UR STRIP-ACNC: 0.2 EU/DL (ref 0.2–1)
WBC # BLD AUTO: 8.6 10E9/L (ref 4–11)

## 2019-02-28 PROCEDURE — 86787 VARICELLA-ZOSTER ANTIBODY: CPT | Performed by: ADVANCED PRACTICE MIDWIFE

## 2019-02-28 PROCEDURE — 87086 URINE CULTURE/COLONY COUNT: CPT | Performed by: ADVANCED PRACTICE MIDWIFE

## 2019-02-28 PROCEDURE — 86762 RUBELLA ANTIBODY: CPT | Performed by: ADVANCED PRACTICE MIDWIFE

## 2019-02-28 PROCEDURE — 86850 RBC ANTIBODY SCREEN: CPT | Performed by: ADVANCED PRACTICE MIDWIFE

## 2019-02-28 PROCEDURE — 84443 ASSAY THYROID STIM HORMONE: CPT | Performed by: ADVANCED PRACTICE MIDWIFE

## 2019-02-28 PROCEDURE — 86901 BLOOD TYPING SEROLOGIC RH(D): CPT | Performed by: ADVANCED PRACTICE MIDWIFE

## 2019-02-28 PROCEDURE — 82306 VITAMIN D 25 HYDROXY: CPT | Performed by: ADVANCED PRACTICE MIDWIFE

## 2019-02-28 PROCEDURE — 87340 HEPATITIS B SURFACE AG IA: CPT | Performed by: ADVANCED PRACTICE MIDWIFE

## 2019-02-28 PROCEDURE — 87389 HIV-1 AG W/HIV-1&-2 AB AG IA: CPT | Performed by: ADVANCED PRACTICE MIDWIFE

## 2019-02-28 PROCEDURE — 76817 TRANSVAGINAL US OBSTETRIC: CPT | Performed by: OBSTETRICS & GYNECOLOGY

## 2019-02-28 PROCEDURE — 86900 BLOOD TYPING SEROLOGIC ABO: CPT | Performed by: ADVANCED PRACTICE MIDWIFE

## 2019-02-28 PROCEDURE — 85027 COMPLETE CBC AUTOMATED: CPT | Performed by: ADVANCED PRACTICE MIDWIFE

## 2019-02-28 PROCEDURE — 0064U ANTB TP TOTAL&RPR IA QUAL: CPT | Performed by: ADVANCED PRACTICE MIDWIFE

## 2019-02-28 PROCEDURE — 36415 COLL VENOUS BLD VENIPUNCTURE: CPT | Performed by: ADVANCED PRACTICE MIDWIFE

## 2019-02-28 PROCEDURE — 99207 ZZC FIRST OB VISIT: CPT | Performed by: ADVANCED PRACTICE MIDWIFE

## 2019-02-28 PROCEDURE — 81003 URINALYSIS AUTO W/O SCOPE: CPT | Performed by: ADVANCED PRACTICE MIDWIFE

## 2019-02-28 PROCEDURE — 83036 HEMOGLOBIN GLYCOSYLATED A1C: CPT | Performed by: ADVANCED PRACTICE MIDWIFE

## 2019-02-28 RX ORDER — PRENATAL VIT/IRON FUM/FOLIC AC 27MG-0.8MG
1 TABLET ORAL DAILY
COMMUNITY
End: 2020-09-21

## 2019-02-28 SDOH — HEALTH STABILITY: MENTAL HEALTH: HOW OFTEN DO YOU HAVE A DRINK CONTAINING ALCOHOL?: NEVER

## 2019-02-28 ASSESSMENT — ANXIETY QUESTIONNAIRES
6. BECOMING EASILY ANNOYED OR IRRITABLE: NOT AT ALL
IF YOU CHECKED OFF ANY PROBLEMS ON THIS QUESTIONNAIRE, HOW DIFFICULT HAVE THESE PROBLEMS MADE IT FOR YOU TO DO YOUR WORK, TAKE CARE OF THINGS AT HOME, OR GET ALONG WITH OTHER PEOPLE: NOT DIFFICULT AT ALL
3. WORRYING TOO MUCH ABOUT DIFFERENT THINGS: NOT AT ALL
5. BEING SO RESTLESS THAT IT IS HARD TO SIT STILL: NOT AT ALL
2. NOT BEING ABLE TO STOP OR CONTROL WORRYING: NOT AT ALL
7. FEELING AFRAID AS IF SOMETHING AWFUL MIGHT HAPPEN: NOT AT ALL
1. FEELING NERVOUS, ANXIOUS, OR ON EDGE: SEVERAL DAYS
GAD7 TOTAL SCORE: 1

## 2019-02-28 ASSESSMENT — PATIENT HEALTH QUESTIONNAIRE - PHQ9
SUM OF ALL RESPONSES TO PHQ QUESTIONS 1-9: 3
5. POOR APPETITE OR OVEREATING: NOT AT ALL

## 2019-02-28 ASSESSMENT — MIFFLIN-ST. JEOR: SCORE: 2081.61

## 2019-02-28 NOTE — PATIENT INSTRUCTIONS
Thank you for coming to see the Midwives at the   [facility]!      We will notify you about your labs that were drawn today once we get the results back.  If you have [employer] your lab results will be posted there.      Someone from the clinic will call you personally or send you a [employer] message with your results.      If you need any refills of medications please call your pharmacy and they will contact us.      If you have a medical emergency please call 911.      If you have any concerns about today's visit, wish to schedule another appointment, or have an urgent medical concern please call our office at [phone]. You can also make appointments through [employer].      After hours you may also call the clinic number above to be connected with [facility]'s after hours triage nurse.  The nurse can page the midwife on call if needed. There is always a midwife on call 24 hours a day.    Prenatal Care Recommendations:    Before 14 weeks: Dating ultrasound, genetic testing       This ultrasound helps us determine your dates accurately. Innatal (genetic screening test) can be drawn anytime after 10 weeks of gestation.    16 weeks: Optional genetic testing single AFP       This testing helps understand your baby's risk for some genetic abnormalities.    18-22 weeks:  Screening anatomy ultrasound       This testing will look for early growth abnormalities, placenta location, and may tell the baby's gender if you wish to find out.    24-27 weeks: One hour diabetes test (GCT) and complete blood count       This test helps identify diabetes of pregnancy or gestational diabetes.  We also look   at the iron in your blood and how well your blood clots.    28 - 36 weeks: Tetanus shot (Tdap)       This shot helps protect you and your baby from whooping cough.    36 weeks and later: Group B Strep test (GBS)       This test helps predict if you need antibiotics in labor to prevent infection for your baby.    Anytime  September to April:  Flu shot       This shot helps protect you and your family from the flu.  This is especially important during pregnancy.        The typical schedule after your first visit today you can expect:     Visit 2 - 12-16 weeks  Visit 3 - 20 weeks  Visit 4 - 24 weeks  Visit 5 - 28 weeks  Visit 6 - 30 weeks  Visit 7 - 32 weeks  Visit 8 - 34 weeks  Visit 9 - 36 weeks  Weekly after 36 weeks until delivery.        Any time during or after your pregnancy you may experience increased depression and/or mood changes.    We are here to support you. Please contact us if you are:    Feeling anxious    Overwhelmed or sad     Trouble sleeping    Crying uncontrollably    Trouble caring for yourself or baby.    Any thoughts of hurting yourself, your baby, or anyone else    If anything comes up between your visits or you have concerns please don't hesitate to contact us.    Secure access to your medical record:  Use [employer] (secure email communication and access to your chart) to send your primary care provider a message or make an appointment. Ask someone on your Team how to sign up for [employer]. To log on to Digg or for more information in [employer] please visit the website at [email or web address].       Certified Nurse Midwife (CNM) Team    MARCO Hernandez DNP, CALLI, MARCO MCCURDY, MARCO Box, CALLI, CNM, [facility]  Mandy Canales DNP, APRN, CNM      Again, thank you for choosing the midwives at [facility].  We are excited to be a part of your pregnancy. Please let us know how we can best partner with you to improve your and your family's health.

## 2019-02-28 NOTE — PROGRESS NOTES
SUBJECTIVE:     HPI:    This is a 34 year old female patient,  who presents for her first obstetrical visit.    MARIANNA: 10/7/2019, by Last Menstrual Period.  She is 8w3d weeks.  Her cycles are regular.  Her last menstrual period was normal.   Since her LMP, she has experienced  nausea and no periods).   She denies emesis, abdominal pain, fatigue, headache, loss of appetite, vaginal discharge, dysuria, pelvic pain, urinary urgency, lightheadedness, urinary frequency, vaginal bleeding, hemorrhoids and constipation.    Additional History:     Have you travelled during the pregnancy?No  Have your sexual partner(s) travelled during the pregnancy?No      She has not had bleeding since her LMP.    She denies abdominal pain since her LMP, has had mild cramping. Discussed warning signs.  She has had nausea, has had vomiting. Vomiting dily for the last 4 days, prior to that a couple of times. Has not tried any relief measures, recommended Unisom/B6. Gave OB med list.  Any personal or family history of blood clots? No  History of sickle cell anemia or trait? No       MENSTRUAL HISTORY    frequency: every 23-28 days  Last PAP:  3/27/18 NIL, HPV-  History of abnormal Pap?  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   Herpes partner:  no  Chlamydia:  Yes, treated in early s  Gonorrhea:  no  HPV: No  BV:  No  Syphilis:  No  Chicken Pox:  Yes - as a child, has had shingles 3x. Will draw varicella titer.      HISTORY:   Planned Pregnancy: Yes  Marital Status: Single  Occupation: RN  Living in Household: Significant Other    Past History:  Her past medical history   Past Medical History:   Diagnosis Date     Fractured tibia 2015      Kidney stones      Shingles    .      She has a history of  none    Since her last  "LMP she denies use of alcohol, tobacco and street drugs.    Past medical, surgical, social and family history were reviewed and updated in [facility].        Current Outpatient Medications   Medication     Prenatal Vit-Fe Fumarate-FA (PRENATAL MULTIVITAMIN W/IRON) 27-0.8 MG tablet     No current facility-administered medications for this visit.        ROS:   12 point review of systems negative other than symptoms noted below.  Gastrointestinal: Nausea and Vomiting      OBJECTIVE:     EXAM:  /80 (BP Location: Right arm, Patient Position: Sitting, Cuff Size: Adult Large)   Pulse 79   Ht 1.778 m (5' 10\")   Wt 130.6 kg (288 lb)   LMP 12/31/2018 (Exact Date)   Breastfeeding? No   BMI 41.32 kg/m   Body mass index is 41.32 kg/m .    GENERAL: healthy, alert and no distress  NECK: no adenopathy, no asymmetry, masses, or scars and thyroid normal to palpation  RESP: lungs clear to auscultation - no rales, rhonchi or wheezes  CV: regular rate and rhythm, normal S1 S2, no S3 or S4, no murmur, click or rub, no peripheral edema   NEURO: Normal strength and tone, mentation intact and speech normal  PSYCH: mentation appears normal, affect normal/bright    ASSESSMENT/PLAN:       ICD-10-CM    1. Supervision of high-risk pregnancy of elderly primigravida O09.519 ABO/Rh type and screen     Hepatitis B surface antigen     CBC with platelets     HIV Antigen Antibody Combo     Rubella Antibody IgG Quantitative     Treponema Abs w Reflex to RPR and Titer     Urine Culture Aerobic Bacterial     *UA reflex to Microscopic     Non Invasive Prenatal Test Cell Free DNA     TSH with free T4 reflex     Vitamin D Deficiency     Hemoglobin A1c     MAT FETAL MED CTR REFERRAL-PREGNANCY     Preparent Standard Panel     Varicella Zoster Virus Antibody IgG   2. 8 weeks gestation of pregnancy Z3A.08    3. History of chlamydia Z86.19 Chlamydia trachomatis PCR   4. Obesity affecting pregnancy in first trimester O99.211    5. Subchorionic hemorrhage of " placenta in first trimester, single or unspecified fetus O41.8X10 US OB < 14 Weeks Single    O46.8X1    6. History of herpes genitalis Z86.19    7. Elderly multigravida in first trimester O09.521 Preparent Standard Panel   8. Screening examination for venereal disease Z11.3 Neisseria gonorrhoeae PCR   9. Screening for chlamydial disease Z11.8 Chlamydia trachomatis PCR       34 year old , 8w3d weeks of pregnancy with MARIANNA of 10/7/2019, by Last Menstrual Period    Discussed as follows:   -Georgette desires GC/CT testing. Future orders placed for urine.  -AMA; offered MFM consult.  Georgette accepts; referral placed.  -SHERRIE noted on today's US.  Plan to repeat US in two weeks. Discussed warning signs and when to call.  -Georgette andrea Innatal and Standard panel. Orders futured.  -Reviewed CNM care and how to get in touch with On-call CNM   -Plan HSV suppressive therapy at 36 weeks      Counseling given:   - Follow up in 2 weeks for follow up ultrasound and CNM visit. Collect genetic testing and urine GC/CT at that time.  - Recommended weight gain for pregnancy: 11-20 lbs.         PLAN/PATIENT INSTRUCTIONS:        CALLI Mcghee CN    Prenatal OB Questionnaire      Allergies as of 2019:    Allergies as of 2019 - Reviewed 2019   Allergen Reaction Noted     Penicillins Rash 2015       Current medications are:  Current Outpatient Medications   Medication Sig Dispense Refill     Prenatal Vit-Fe Fumarate-FA (PRENATAL MULTIVITAMIN W/IRON) 27-0.8 MG tablet Take 1 tablet by mouth daily           Early ultrasound screening tool:    Does patient have irregular periods?  No  Did patient use hormonal birth control in the three months prior to positive urine pregnancy test? No  Is the patient breastfeeding?  No  Is the patient 10 weeks or greater at time of education visit?  No

## 2019-03-01 LAB
BACTERIA SPEC CULT: NORMAL
DEPRECATED CALCIDIOL+CALCIFEROL SERPL-MC: 18 UG/L (ref 20–75)
HBV SURFACE AG SERPL QL IA: NONREACTIVE
HIV 1+2 AB+HIV1 P24 AG SERPL QL IA: NONREACTIVE
Lab: NORMAL
RUBV IGG SERPL IA-ACNC: 36 IU/ML
SPECIMEN SOURCE: NORMAL
T PALLIDUM AB SER QL: NONREACTIVE
TSH SERPL DL<=0.005 MIU/L-ACNC: 0.86 MU/L (ref 0.4–4)
VZV IGG SER QL IA: 6.8 AI (ref 0–0.8)

## 2019-03-01 ASSESSMENT — ANXIETY QUESTIONNAIRES: GAD7 TOTAL SCORE: 1

## 2019-03-02 DIAGNOSIS — E55.9 VITAMIN D DEFICIENCY: Primary | ICD-10-CM

## 2019-03-02 RX ORDER — CHOLECALCIFEROL (VITAMIN D3) 50 MCG
2 TABLET ORAL DAILY
Qty: 60 TABLET | Refills: 1 | Status: SHIPPED | OUTPATIENT
Start: 2019-03-02 | End: 2019-04-17

## 2019-03-04 ENCOUNTER — HOSPITAL ENCOUNTER (EMERGENCY)
Facility: CLINIC | Age: 34
Discharge: HOME | End: 2019-03-04
Attending: EMERGENCY MEDICINE | Admitting: EMERGENCY MEDICINE
Payer: COMMERCIAL

## 2019-03-04 VITALS
OXYGEN SATURATION: 98 % | SYSTOLIC BLOOD PRESSURE: 135 MMHG | TEMPERATURE: 97.4 F | HEART RATE: 80 BPM | DIASTOLIC BLOOD PRESSURE: 69 MMHG | RESPIRATION RATE: 18 BRPM

## 2019-03-04 DIAGNOSIS — R04.0 LEFT-SIDED EPISTAXIS: ICD-10-CM

## 2019-03-04 DIAGNOSIS — R55 NEAR SYNCOPE: ICD-10-CM

## 2019-03-04 LAB
ABORH_EXT (HISTORICAL CONVERSION): NORMAL
ANION GAP SERPL CALCULATED.3IONS-SCNC: 13 MMOL/L (ref 6–17)
ANTIBODY_EXT (HISTORICAL CONVERSION): NEGATIVE
BUN SERPL-MCNC: 6 MG/DL (ref 5–24)
CA-I BLD-SCNC: 4.9 MG/DL (ref 4.4–5.2)
CHLORIDE BLD-SCNC: 105 MMOL/L (ref 94–109)
CO2 BLD-SCNC: 20 MMOL/L (ref 20–32)
CREAT BLD-MCNC: 0.5 MG/DL (ref 0.52–1.04)
GFR SERPL CREATININE-BSD FRML MDRD: >90 ML/MIN/{1.73_M2}
GLUCOSE BLD-MCNC: 102 MG/DL (ref 70–99)
HBSAG_EXT (HISTORICAL CONVERSION): NORMAL
HCT VFR BLD CALC: 36 %PCV (ref 35–47)
HGB BLD CALC-MCNC: 12.2 G/DL (ref 11.7–15.7)
HGB_EXT (HISTORICAL CONVERSION): 12.3
HIV_EXT: NORMAL
INTERPRETATION ECG - MUSE: NORMAL
PLT_EXT - HISTORICAL: 305
POTASSIUM BLD-SCNC: 3.6 MMOL/L (ref 3.4–5.3)
RPR - HISTORICAL: NORMAL
RUBELLA_EXT (HISTORICAL CONVERSION): NORMAL
SODIUM BLD-SCNC: 138 MMOL/L (ref 133–144)

## 2019-03-04 PROCEDURE — 99284 EMERGENCY DEPT VISIT MOD MDM: CPT

## 2019-03-04 PROCEDURE — 93005 ELECTROCARDIOGRAM TRACING: CPT

## 2019-03-04 PROCEDURE — 85014 HEMATOCRIT: CPT

## 2019-03-04 PROCEDURE — 80047 BASIC METABLC PNL IONIZED CA: CPT

## 2019-03-04 ASSESSMENT — ENCOUNTER SYMPTOMS
ABDOMINAL PAIN: 0
CHILLS: 0
RHINORRHEA: 0
WEAKNESS: 0
LIGHT-HEADEDNESS: 1
NUMBNESS: 1
FEVER: 0
SHORTNESS OF BREATH: 0

## 2019-03-04 NOTE — DISCHARGE INSTRUCTIONS
"Discharge Instructions  Epistaxis    Today you were seen for a nosebleed.   Nosebleeds (the medical term is \"epistaxis\") are very common. Almost every person has had at least one in their lifetime.  Although the amount of blood loss can appear dramatic, nosebleeds rarely cause serious problems.  The most common causes are dry air or nose picking, but they also are common in people who have allergies, high blood pressure, or are on blood thinners (such as Coumadin, Aspirin or Plavix). If you or your child gets a nosebleed, the important thing is to know how to take care of it. With the right self-care, most nosebleeds will stop on their own.    Generally, every Emergency Department visit should have a follow-up clinic visit with either a primary or a specialty clinic/provider. Please follow-up as instructed by your emergency provider today.    Return to the Emergency Department if:  Your nose is bleeding a very large amount of blood and you are unable to stop it.  You get very pale, faint, or tired.  You cannot get the bleeding to stop after following these instructions.    Treatment:  Your provider may tell you to use a decongestant nose spray, like Afrin  (oxymetazoline), in both nostrils in the morning and at night for the next three days. Do not use this medicine for more than three days at a time.  If you do, it will cause nasal congestion.   Use a moisturizer. A small amount of Vaseline  to the inside of your nostrils for moisture before bed is one option. There are nasal sprays available over-the-counter for this purpose as well.  Using a humidifier in your bedroom or home will help as well when the air is dry.  For the next three days, do not blow your nose or put anything in your nose. You may sniffle, or dab the outside of your nose.  Do not bend with your head below your waist for the next three days. Do not lift anything so heavy that you have to strain.   If you received nasal packing, please do not " remove the packing until seen by an Ear, Nose, and Throat (ENT) specialist.  If antibiotics have been given with the packing, please take as directed.    If your nose starts to bleed again:  Blow your nose to get rid of some of the clots that have formed inside your nostrils. This may increase the bleeding temporarily, but that is okay.  Spray decongestant nose spray (like Afrin ) into both nostrils to constrict the blood vessels.  Sit or stand while bending forward slightly at the waist. Do not lie down or tilt your head back. This may cause you to swallow blood and can lead to vomiting.   the soft part of BOTH nostrils at the bottom of your nose and squeeze your nose closed for at least 5 minutes (for children) or 10 to 15 minutes (for adults). Use a clock to time yourself. Do not release the pressure every so often to check whether the bleeding has stopped. Many people hurt their chances of stopping the bleeding by releasing the pressure too soon or too often.    If you follow the steps outlined above, and your nose continues to bleed, repeat all the steps once more. Apply pressure for a total of at least 30 minutes. If you continue to bleed even then, seek medical attention.  If you were given a prescription for medicine here today, be sure to read all of the information (including the package insert) that comes with your prescription.  This will include important information about the medicine, its side effects, and any warnings that you need to know about.  The pharmacist who fills the prescription can provide more information and answer questions you may have about the medicine.  If you have questions or concerns that the pharmacist cannot address, please call or return to the Emergency Department.   Remember that you can always come back to the Emergency Department if you are not able to see your regular provider in the amount of time listed above, if you get any new symptoms, or if there is anything  that worries you.

## 2019-03-04 NOTE — ED PROVIDER NOTES
History     Chief Complaint:  Epistaxis      HPI   Georgette Sanchez is a 34 year old female who is nine weeks preganant who presents with epitaxies. The patient states that prior to arrival she had epitaxies and notes it was more severe than her past episode. She notes she felt warm, light headed, numb and tingling. Blood was coming out of her left nostril and states she was spitting out blood that was dripping down her throat. Here in the ED, the patient is not actively bleeding. Of note, she notes she was recently seen at her OB and had routine laboratory check. She denies syncope, fever, chills, congestion, rhinorrhea, abdominal pain, chest pain, shortness of breath, or loss of consciousness.    Allergies:  Penicillins    Medications:    The patient is currently on no regular medications.    Past Medical History:    Kidney stone  Shingles    Past Surgical History:    Tonsillectomy    Family History:    Thyroid disease    Social History:  Marital Status:  Single [1]  Former smoker  Negative for alcohol use.    Review of Systems   Constitutional: Negative for chills and fever.   HENT: Positive for nosebleeds. Negative for congestion and rhinorrhea.    Respiratory: Negative for shortness of breath.    Cardiovascular: Negative for chest pain.   Gastrointestinal: Negative for abdominal pain.   Neurological: Positive for light-headedness and numbness. Negative for syncope and weakness.   All other systems reviewed and are negative.      Physical Exam     Patient Vitals for the past 24 hrs:   BP Temp Temp src Pulse Resp SpO2   03/04/19 0934 135/69 97.4  F (36.3  C) Temporal 80 18 98 %         Physical Exam  Constitutional: Alert, attentive  HENT:    Nose: Nose normal.    Mouth/Throat: Oropharynx is clear, mucous membranes are moist   Eyes: EOM are normal.   CV: regular rate and rhythm; no murmurs, rubs or gallups  Chest: Effort normal and breath sounds normal.   GI:  There is no tenderness. No distension. Normal bowel  sounds  MSK: Normal range of motion.   Neurological: Alert, attentive  Skin: Skin is warm and dry.      Emergency Department Course   ECG:  Indication: Light Headed  Time: 0954  Vent. Rate 64 bpm. DE interval 168. QRS duration 98. QT/QTc 402/4141. P-R-T axis 25 59 27.  Normal sinus rhythm. Normal ECG. Read time: 1000    Laboratory:  [facility] POCT: Glucose 102(H), Creatinine 0.4(L), o/w WNL     Emergency Department Course:  Nursing notes and vitals reviewed. (0936) I performed an exam of the patient as documented above.     Blood drawn. This was sent to the lab for further testing, results above.    EKG was done, interpretation as above.    (1010) I rechecked the patient and discussed the results of her workup thus far.     Findings and plan explained to the Patient and spouse. Patient discharged home with instructions regarding supportive care, medications, and reasons to return. The importance of close follow-up was reviewed.     I personally reviewed the laboratory results with the Patient and spouse and answered all related questions prior to discharge.      Impression & Plan    Medical Decision Making:  This is a well-appearing 34-year-old female who presents for evaluation of now resolved epistaxis and near syncopal episode.  Exam is suggestive of left anterior epistaxis which is now resolved.  This is likely attributable to cold dry weather based on history.  There is no indication for nasal packing at this time.  Hemoglobin was unremarkable, as are electrolytes and EKG, performed given history of what appears to be most consistent with vasovagal near syncope.  I discussed the phenomenon of anemia and dizziness episodes in pregnancy.  I recommended supportive cares for epistaxis including Aquaphor and/or Vaseline to the nares.  She will follow-up with OB/GYN in 3-5 days for routine follow-up, and with ENT in 1 week if symptoms persist.  She was provided a nasal clamp instructions to place the clamp for 10 minutes  should bleeding reoccur.  She should return immediately for any retractable or persistent bleeding, recurrent dizziness episodes, or any other concerns.    Diagnosis:    ICD-10-CM    1. Left-sided epistaxis R04.0    2. Near syncope R55        Disposition:  discharged to home     Scribe Disclosure:  I, Dawn Brush, am serving as a scribe on 3/4/2019 at 9:36 AM to personally document services performed by No att. providers found based on my observations and the provider's statements to me.       Dawn Brush  3/4/2019   [facility] EMERGENCY DEPARTMENT       Luis Fneg MD  03/04/19 1514

## 2019-03-04 NOTE — ED TRIAGE NOTES
ABCs intact. Pt c/o epistaxis this morning. Pt is about 9 weeks pregnant and felt a little lightheaded this morning.

## 2019-03-04 NOTE — ED AVS SNAPSHOT
[facility] Emergency Department  [address]  [facility] [address]  Phone:  [phone]  Fax:  [phone]                                    Georgette Sanchez   MRN: [id number]    Department:  [facility] Emergency Department   Date of Visit:  3/4/2019           After Visit Summary Signature Page    I have received my discharge instructions, and my questions have been answered. I have discussed any challenges I see with this plan with the nurse or doctor.    ..........................................................................................................................................  Patient/Patient Representative Signature      ..........................................................................................................................................  Patient Representative Print Name and Relationship to Patient    ..................................................               ................................................  Date                                   Time    ..........................................................................................................................................  Reviewed by Signature/Title    ...................................................              ..............................................  Date                                               Time          22EPIC Rev 08/18

## 2019-03-22 ENCOUNTER — PRENATAL OFFICE VISIT (OUTPATIENT)
Dept: MIDWIFE SERVICES | Facility: CLINIC | Age: 34
End: 2019-03-22
Attending: ADVANCED PRACTICE MIDWIFE
Payer: COMMERCIAL

## 2019-03-22 ENCOUNTER — ANCILLARY PROCEDURE (OUTPATIENT)
Dept: ULTRASOUND IMAGING | Facility: CLINIC | Age: 34
End: 2019-03-22
Attending: ADVANCED PRACTICE MIDWIFE
Payer: COMMERCIAL

## 2019-03-22 ENCOUNTER — RECORDS - HEALTHEAST (OUTPATIENT)
Dept: ADMINISTRATIVE | Facility: OTHER | Age: 34
End: 2019-03-22

## 2019-03-22 VITALS — DIASTOLIC BLOOD PRESSURE: 76 MMHG | BODY MASS INDEX: 40.89 KG/M2 | WEIGHT: 285 LBS | SYSTOLIC BLOOD PRESSURE: 114 MMHG

## 2019-03-22 DIAGNOSIS — O09.519 SUPERVISION OF HIGH-RISK PREGNANCY OF ELDERLY PRIMIGRAVIDA: Primary | ICD-10-CM

## 2019-03-22 DIAGNOSIS — O99.211 OBESITY AFFECTING PREGNANCY IN FIRST TRIMESTER: ICD-10-CM

## 2019-03-22 DIAGNOSIS — Z13.71 SCREENING FOR GENETIC DISEASE CARRIER STATUS: ICD-10-CM

## 2019-03-22 PROCEDURE — 76801 OB US < 14 WKS SINGLE FETUS: CPT | Performed by: OBSTETRICS & GYNECOLOGY

## 2019-03-22 PROCEDURE — 99000 SPECIMEN HANDLING OFFICE-LAB: CPT | Performed by: ADVANCED PRACTICE MIDWIFE

## 2019-03-22 PROCEDURE — 40000791 ZZHCL STATISTIC VERIFI PRENATAL TRISOMY 21,18,13: Mod: 90 | Performed by: ADVANCED PRACTICE MIDWIFE

## 2019-03-22 PROCEDURE — 99207 ZZC PRENATAL VISIT: CPT | Performed by: ADVANCED PRACTICE MIDWIFE

## 2019-03-22 PROCEDURE — 36415 COLL VENOUS BLD VENIPUNCTURE: CPT | Performed by: ADVANCED PRACTICE MIDWIFE

## 2019-03-22 PROCEDURE — 40001065 ZZHCL STATISTIC PREPARENT STANDARD PANEL: Mod: 90 | Performed by: ADVANCED PRACTICE MIDWIFE

## 2019-03-22 NOTE — PROGRESS NOTES
Patient feels okay.  Here with Abhi.  Patient has had nausea and has had vomiting 3-4x/week.  She may start Unisom and B6.  Is taking Vitamin D    Educated about diet, exercise and normal weight gain  Normal to feel movement between 18-22 weeks  Reviewed labs from 1st OB  GC/Chlamydia urine collection today  Discussed genetic screening; patient desires innatal and trio carrier screen.  Will draw today.  [facility] visit still needs to schedule. Will do.  Warning signs discussed    Reviewed weight loss of 3 lbs since last visit.    Return to clinic 4 weeks    JACINTA Hernandez  [facility]     I, Marci Doshi, am serving as a scribe; to document services personally performed by Salma MCCURDY CNM- based on data collection and the provider's statements to me.     Provider Disclosure:  I agree with above History, Review of Systems, Physical exam and Plan. I have reviewed the content of the documentation and have edited it as needed. I have personally performed the services documented here and the documentation accurately represents those services and the decisions I have made.    Salma MCCURDY CNM

## 2019-03-22 NOTE — PATIENT INSTRUCTIONS
Remedies for nausea and vomiting with pregnancy      Eat small frequent meals every 2-3 hours if possible.       Avoid food at extremes of temperature and drinks with carbonation.      Eat foods that appeal to you, avoiding fats and spicy foods.      Avoid liquids with foods.  Drink liquids 30-60 minutes before or after eating and sip slowly.      Bread, pasta, crackers, potatoes, and rice tend to be tolerated the best.      Don't worry about what you eat in the first 3 months, it is more important that you can eat and keep it down.       Try flat ginger ale or ginger tea      Before rising in the morning, eat a small amount of crackers or dry toast.      Peppermint tea      Ginger is a herbal remedy for nausea and you can use it in any form.  There are ginger tablets you can purchase.  The dose 1000 mg a day in divided doses.       You may also try doxylamine (Unisom) 12.5 mg three times a day which is a sleeping medication along with Vitamin B6 25 mg three times a day.  This combination takes up to a week to work so give it some time.       Benadryl (diphenhydramine) 25-50 mg every 8 hour or Dramamine (dimenhydrinate)  mg by mouth every 4-6 hours. Both of these medication may cause some drowsiness      Other things that may help include an Accupressure band and acupuncture      If these methods fail there are many prescriptions that we can try    If you begin to vomit more than 5 or 6 times a day and feel that you are unable to keep anything down, call the [facility] at [phone]  Recommendations for total and rate of weight gain during pregnancy, by prepregnancy BMI    Total weight gain Rates of weight gain*  2nd and 3rd trimester   Prepregnancy BMI Range in kg Range in lbs Mean (range) in kg/week Mean (range) in lbs/week   Underweight (<18.5 kg/m2) 12.5-18 28-40 0.51 (0.44-0.58) 1 (1-1.3)   Normal weight (18.5-24.9 kg/m2) 11.5-16 25-35 0.42 (0.35-0.50) 1 (0.8-1)   Overweight (25.0-29.9  kg/m2) 7-11.5 15-25 0.28 (0.23-0.33) 0.6 (0.5-0.7)   Obese (?30.0 kg/m2) 5-9 11-20 0.22 (0.17-0.27) 0.5 (0.4-0.6)   BMI: body mass index.  * Calculations assume a 0.5-2 kg (1.1-4.4 lbs) weight gain in the first trimester.  * To calculate BMI go to [email or web address]/bmi/

## 2019-03-24 LAB
HBV SURFACE AG SERPL QL IA: NON REACTIVE
HIV 1+2 AB+HIV1 P24 AG SERPL QL IA: NON REACTIVE
RUBELLA ABY IGG: NORMAL
TREPONEMA ANTIBODIES: NON REACTIVE

## 2019-03-28 ENCOUNTER — E-VISIT (OUTPATIENT)
Dept: INTERNAL MEDICINE | Facility: CLINIC | Age: 34
End: 2019-03-28
Payer: COMMERCIAL

## 2019-03-28 DIAGNOSIS — N61.0 CELLULITIS OF BREAST: Primary | ICD-10-CM

## 2019-03-28 PROCEDURE — 99444 ZZC PHYSICIAN ONLINE EVALUATION & MANAGEMENT SERVICE: CPT | Performed by: INTERNAL MEDICINE

## 2019-03-29 RX ORDER — CEPHALEXIN 500 MG/1
500 CAPSULE ORAL 4 TIMES DAILY
Qty: 28 CAPSULE | Refills: 0 | Status: SHIPPED | OUTPATIENT
Start: 2019-03-29 | End: 2019-07-19

## 2019-04-04 ENCOUNTER — TELEPHONE (OUTPATIENT)
Dept: MIDWIFE SERVICES | Facility: CLINIC | Age: 34
End: 2019-04-04

## 2019-04-04 DIAGNOSIS — O09.519 SUPERVISION OF HIGH-RISK PREGNANCY OF ELDERLY PRIMIGRAVIDA: ICD-10-CM

## 2019-04-04 NOTE — TELEPHONE ENCOUNTER
Innatal results: negative  Preparent Carrier Screen - Standard Panel: Negative  Preparent Carrier Screen - Hemoglobinopathy Evaluation: Normal    TEST RESULT INTERPRETATION   Chromosome 21 No aneuploidy detected  Results consistent with two copies of chromosome 21   Chromosome 18 No aneuploidy detected  Results consistent with two copies of chromosome 18   Chromosome 13 No aneuploidy detected  Results consistent with two copies of chromosome 13   Sex Chromosome No aneuploidy detected  Results consistent with two sex chromosomes:  male     Called pt with results.  LMTCB as no phi

## 2019-04-04 NOTE — TELEPHONE ENCOUNTER
Pt returned call  Informed of negative and normal results  Sex of baby was given to the pt over the phone per pt request.  Pt verbalized understanding, in agreement with plan, and voiced no further questions.

## 2019-04-05 DIAGNOSIS — E66.01 MORBID OBESITY (H): Primary | ICD-10-CM

## 2019-04-05 DIAGNOSIS — O99.211 OBESITY AFFECTING PREGNANCY IN FIRST TRIMESTER: ICD-10-CM

## 2019-04-05 LAB
NON INVASIVE PRENATAL TEST CELL FREE DNA: NORMAL
PREPARENT STANDARD PANEL: NORMAL

## 2019-04-17 ENCOUNTER — PRENATAL OFFICE VISIT (OUTPATIENT)
Dept: MIDWIFE SERVICES | Facility: CLINIC | Age: 34
End: 2019-04-17
Payer: COMMERCIAL

## 2019-04-17 VITALS — DIASTOLIC BLOOD PRESSURE: 60 MMHG | WEIGHT: 283.4 LBS | BODY MASS INDEX: 40.66 KG/M2 | SYSTOLIC BLOOD PRESSURE: 118 MMHG

## 2019-04-17 DIAGNOSIS — O21.9 NAUSEA/VOMITING IN PREGNANCY: ICD-10-CM

## 2019-04-17 DIAGNOSIS — O09.519 SUPERVISION OF HIGH-RISK PREGNANCY OF ELDERLY PRIMIGRAVIDA: ICD-10-CM

## 2019-04-17 DIAGNOSIS — Z36.1 ENCOUNTER FOR ANTENATAL SCREENING FOR RAISED ALPHAFETOPROTEIN LEVEL: Primary | ICD-10-CM

## 2019-04-17 DIAGNOSIS — Z11.3 ROUTINE SCREENING FOR STI (SEXUALLY TRANSMITTED INFECTION): ICD-10-CM

## 2019-04-17 PROCEDURE — 36415 COLL VENOUS BLD VENIPUNCTURE: CPT | Performed by: ADVANCED PRACTICE MIDWIFE

## 2019-04-17 PROCEDURE — 82105 ALPHA-FETOPROTEIN SERUM: CPT | Mod: 90 | Performed by: ADVANCED PRACTICE MIDWIFE

## 2019-04-17 PROCEDURE — 99000 SPECIMEN HANDLING OFFICE-LAB: CPT | Performed by: ADVANCED PRACTICE MIDWIFE

## 2019-04-17 PROCEDURE — 87491 CHLMYD TRACH DNA AMP PROBE: CPT | Performed by: ADVANCED PRACTICE MIDWIFE

## 2019-04-17 PROCEDURE — 99207 ZZC PRENATAL VISIT: CPT | Performed by: ADVANCED PRACTICE MIDWIFE

## 2019-04-17 PROCEDURE — 87591 N.GONORRHOEAE DNA AMP PROB: CPT | Performed by: ADVANCED PRACTICE MIDWIFE

## 2019-04-17 RX ORDER — ONDANSETRON 4 MG/1
4 TABLET, FILM COATED ORAL EVERY 8 HOURS PRN
Qty: 30 TABLET | Refills: 0 | Status: SHIPPED | OUTPATIENT
Start: 2019-04-17 | End: 2019-07-19

## 2019-04-17 RX ORDER — ONDANSETRON 4 MG/1
4 TABLET, ORALLY DISINTEGRATING ORAL EVERY 6 HOURS PRN
Qty: 12 TABLET | Refills: 0 | Status: SHIPPED | OUTPATIENT
Start: 2019-04-17 | End: 2019-05-09

## 2019-04-17 NOTE — PATIENT INSTRUCTIONS
Fiber Facts    A daily intake of about 25-30 grams of fiber is recommended. Most Americans only get about 12 grams of fiber on average. Fiber is very important in the diet to promote bowel regularity, lower cholesterol, lower risk of developing type 2 diabetes, and decrease chance of weight gain.  In pregnancy your intestinal motility slows down, so fiber is even more important.    Start gradually increasing fiber intake to reduce the risk of bloating and gas. Increase by about 5 grams a day every few days until you reach your fiber goals!    Food      Amount   Grams of Fiber  Grains  Zackary's All Bran Cereal   1/2 cup   10  Natural Oven's Stay Trim Bread 1 slice    5  Brown Rice (cooked)  1 cup    4  Cheerios    1 cup    3  Whole Wheat Crackers  5 crackers   3.5  Rye crackers    3 crackers   3    Cereals and whole grains are excellent ways to increase fiber in your diet. Try to find cereals that have at least 8 grams of fiber per serving.    Fruits  Blackberries     1 cup    7   Figs      3 dried   7  Apple      1     4  Pear     1    4   Orange    1    3    Vegetables  Winter squash   1 cup    9  Broccoli     1 cup    4  Corn      1 cup    4  Swiss chard (cooked)  1 cup     4  Cauliflower     1 cup     3  Potato     1 large w/skin  5    Beans  Kidney, red    1/2 cup   8  Lentils     1/2 cup cooked  8  Black     1/2 cup    7  Navy     1/2 cup   7  Kemp     1/2 cup   7  White      1/2 cup   6  Garbanzo/Chickpeas  1/2 cup   5      Constipation    Constipation can be caused by many factors such as poor diet, lack of activity, and medications. Often times women experience more constipation during pregnancy due to decreased intestinal motility.    DRINK TONS OF WATER! 2.5 liters (4 pints) of water per day      Activity is very important. Increase your daily activity to at least 20-60 minutes. This increases blood flow to your gut and improves bowel function    Limit caffeine and alcohol intake    Avoid foods you've  identified as constipating    Increase fiber intake: there are ways to increase you fiber through your diet but there are also OTC agents such as Metamucil or Benfiber that you can supplement your diet with    Use probiotics such as Florastor and/or prebiotics such as oligosaccharides    Consider using an Omega 3 supplement, flaxseed and tristen seeds are great sources    Plan adequate time for elimination, it is most effective right after activity, and it may be beneficial to plan a consistent time daily    Food Suggestions      Eat beans, nuts, whole grain breads and cereals, oats, barley, figs, apples with skin, raisins, green leafy vegetables, fresh and dried fruits (especially grapes), prunes or prune juice    Eat popcorn nightly    Eat 2-3 salads per day    Add a pinch of cayenne pepper to food    1-2 tbsp of olive oil per day    Lemon juice and/or honey in warm water every morning before breakfast    Decrease red meat, refined white flour products like white rice, white bread and pasta    Tea infusions of: rosemary, chamomile, lemon balm, senna leaf, alfalfa, fennel seeds, lavender, cascara, dandelion, licorice root tea, psyllium seeds, marshmallow root    Other remedies      Increase Vitamin B and E (800 IU if not pregnant, 400 IU if pregnant per day) and potassium, calcium, and magnesium supplements      If these remedies fail, medications are an option as well. Please talk with your midwife if you continue to struggle with constipation. If you are pregnant please double check with us before starting any medications!

## 2019-04-17 NOTE — PROGRESS NOTES
Still having NVP. Vomits at least 5 times a week. Most of the time can keep foods down. Has tried the Vitamin B6/Unisom combo without much relief. Is interested in trying Zofran.   Zofran sent for 30 tablets, 4mg Q8hrs   Fetal movement No  Denies loss of fluid/vb/contractions/pelvic pain  Depression screening not done. No concerns about mood or depression    AFP today, urine GC/CT today  Anatomy ultrasound next visit between 18-22 weeks. Has already scheduled with MFM on 5/9/19    Return to clinic 4 weeks    Salma MCCURDY CNM

## 2019-04-18 LAB
C TRACH DNA SPEC QL NAA+PROBE: NEGATIVE
N GONORRHOEA DNA SPEC QL NAA+PROBE: NEGATIVE
SPECIMEN SOURCE: NORMAL
SPECIMEN SOURCE: NORMAL

## 2019-04-19 LAB
# FETUSES US: NORMAL
# FETUSES: NORMAL
AFP ADJ MOM AMN: 1.05
AFP SERPL-MCNC: 20 NG/ML
AGE - REPORTED: 35.6 YR
CURRENT SMOKER: NO
CURRENT SMOKER: NO
DIABETES STATUS PATIENT: NO
FAMILY MEMBER DISEASES HX: NO
FAMILY MEMBER DISEASES HX: NO
GA METHOD: NORMAL
GA METHOD: NORMAL
GA: NORMAL WK
IDDM PATIENT QL: NO
INTEGRATED SCN PATIENT-IMP: NORMAL
LMP START DATE: NORMAL
MONOCHORIONIC TWINS: NO
SERVICE CMNT-IMP: NO
SPECIMEN DRAWN SERPL: NORMAL
VALPROIC/CARBAMAZEPINE STATUS: NO
WEIGHT UNITS: NORMAL

## 2019-05-02 ENCOUNTER — PRE VISIT (OUTPATIENT)
Dept: MATERNAL FETAL MEDICINE | Facility: CLINIC | Age: 34
End: 2019-05-02

## 2019-05-09 ENCOUNTER — PRENATAL OFFICE VISIT (OUTPATIENT)
Dept: MIDWIFE SERVICES | Facility: CLINIC | Age: 34
End: 2019-05-09
Payer: COMMERCIAL

## 2019-05-09 ENCOUNTER — HOSPITAL ENCOUNTER (OUTPATIENT)
Dept: ULTRASOUND IMAGING | Facility: CLINIC | Age: 34
Discharge: HOME | End: 2019-05-09
Attending: ADVANCED PRACTICE MIDWIFE | Admitting: ADVANCED PRACTICE MIDWIFE
Payer: COMMERCIAL

## 2019-05-09 ENCOUNTER — RECORDS - HEALTHEAST (OUTPATIENT)
Dept: ADMINISTRATIVE | Facility: OTHER | Age: 34
End: 2019-05-09

## 2019-05-09 ENCOUNTER — OFFICE VISIT (OUTPATIENT)
Dept: MATERNAL FETAL MEDICINE | Facility: CLINIC | Age: 34
End: 2019-05-09
Attending: ADVANCED PRACTICE MIDWIFE
Payer: COMMERCIAL

## 2019-05-09 VITALS — WEIGHT: 285 LBS | DIASTOLIC BLOOD PRESSURE: 62 MMHG | BODY MASS INDEX: 40.89 KG/M2 | SYSTOLIC BLOOD PRESSURE: 106 MMHG

## 2019-05-09 DIAGNOSIS — Z3A.18 18 WEEKS GESTATION OF PREGNANCY: Primary | ICD-10-CM

## 2019-05-09 DIAGNOSIS — O99.212 MATERNAL OBESITY SYNDROME, ANTEPARTUM, SECOND TRIMESTER: ICD-10-CM

## 2019-05-09 DIAGNOSIS — O09.519 SUPERVISION OF HIGH-RISK PREGNANCY OF ELDERLY PRIMIGRAVIDA: ICD-10-CM

## 2019-05-09 DIAGNOSIS — O99.211 OBESITY AFFECTING PREGNANCY IN FIRST TRIMESTER: ICD-10-CM

## 2019-05-09 DIAGNOSIS — O09.522 ELDERLY MULTIGRAVIDA IN SECOND TRIMESTER: ICD-10-CM

## 2019-05-09 DIAGNOSIS — O26.90 PREGNANCY RELATED CONDITION, ANTEPARTUM: ICD-10-CM

## 2019-05-09 DIAGNOSIS — O09.512 SUPERVISION OF ELDERLY PRIMIGRAVIDA IN SECOND TRIMESTER: Primary | ICD-10-CM

## 2019-05-09 PROCEDURE — 96040 ZZH GENETIC COUNSELING, EACH 30 MINUTES: CPT | Mod: ZF | Performed by: GENETIC COUNSELOR, MS

## 2019-05-09 PROCEDURE — 99207 ZZC PRENATAL VISIT: CPT | Performed by: NURSE PRACTITIONER

## 2019-05-09 PROCEDURE — 76811 OB US DETAILED SNGL FETUS: CPT

## 2019-05-09 NOTE — PROGRESS NOTES
Please see full imaging report from [facility] program under imaging tab.    Cory Perez MD  Maternal Fetal Medicine

## 2019-05-09 NOTE — PATIENT INSTRUCTIONS
Remedies for nausea and vomiting with pregnancy      Eat small frequent meals every 2-3 hours if possible.       Avoid food at extremes of temperature and drinks with carbonation.      Eat foods that appeal to you, avoiding fats and spicy foods.      Avoid liquids with foods.  Drink liquids 30-60 minutes before or after eating and sip slowly.      Bread, pasta, crackers, potatoes, and rice tend to be tolerated the best.      Don't worry about what you eat in the first 3 months, it is more important that you can eat and keep it down.       Try flat ginger ale or ginger tea      Before rising in the morning, eat a small amount of crackers or dry toast.      Peppermint tea      Ginger is a herbal remedy for nausea and you can use it in any form.  There are ginger tablets you can purchase.  The dose 1000 mg a day in divided doses.       You may also try doxylamine (Unisom) 12.5 mg three times a day which is a sleeping medication along with Vitamin B6 25 mg three times a day.  This combination takes up to a week to work so give it some time.       Benadryl (diphenhydramine) 25-50 mg every 8 hour or Dramamine (dimenhydrinate)  mg by mouth every 4-6 hours. Both of these medication may cause some drowsiness      Other things that may help include an Accupressure band and acupuncture      If these methods fail there are many prescriptions that we can try    If you begin to vomit more than 5 or 6 times a day and feel that you are unable to keep anything down, call the [facility] at [phone]

## 2019-05-09 NOTE — PROGRESS NOTES
[facility] [place]  Genetic Counseling Consult    Patient:  Georgette Sanchez YOB: 1985   Date of Service:  19      Georgette Sanchez was seen at the [facility] [place] for genetic consultation as part of her appointment for comprehensive ultrasound.  The indication for genetic counseling is advanced maternal age.       Impression/Plan:   1. Georgette had a cell-free fetal DNA test earlier in pregnancy, which was normal.    2. Georgette had a comprehensive (level II) ultrasound today.  Please see the ultrasound report for further details.     3. Georgette has a significant family history of early onset breast cancer.  Both her mother and maternal aunt were diagnosed with breast cancer in their 40's.  We discussed that this history indicates an increase in risk for Georgette, and that increased screening may be recommended.  I encouraged Georgette to discuss this with her primary care provider to create an appropriate screening plan.      Pregnancy History:   /Parity:    Age at Delivery: 34 year old  MARIANNA: 10/7/2019, by Last Menstrual Period  Gestational Age: 18w3d    No significant complications or exposures were reported in the current pregnancy.    Medical History:   Georgette s reported medical history is not expected to impact pregnancy management or risks to fetal development.       Family History:   A three-generation pedigree was obtained, and is scanned under the  Media  tab.   The following significant findings were reported by Georgette:    Georgette's mother and maternal aunt were both diagnosed with breast cancer in their 40s.  We discussed that this history could be indicative of a hereditary predisposition to breast cancer, and Georgette reports that she believes someone in the family, possible her aunt, had genetic testing for BRCA1 and BRCA2, which was negative.  I encouraged Georgette to follow up with her family members to learn  if their was any additional information available regarding who had been tested, when, and what for.  Georgette reports that her aunt's daughter opted to have a prophylactic bilateral mastectomy in her 30s due to the family history.  We discussed that based on family history alone, it would be recommended that Georgette begin screening for breast cancer in the near future.  We discussed that sometimes breast cancer screening is delayed until after pregnancy/breast feeding, but that this topic should be discussed with her primary care provider to ensure an appropriate screening plan is in place for Georgette    Otherwise, the reported family history is negative for multiple miscarriages, stillbirths, birth defects, cognitive impairment, known genetic conditions, and consanguinity.       Carrier Screening:       The patient has had previous carrier screening for an expanded panel of recessive/x linked conditions (Preparent Standard Panel through [employer]), the results of which were negative.  A copy of the report was available for our review today.       Risk Assessment for Chromosome Conditions:   We explained that the risk for fetal chromosome abnormalities increases with maternal age. We discussed specific features of common chromosome abnormalities, including Down syndrome, trisomy 13, trisomy 18, and sex chromosome trisomies.      - At age 34 at midtrimester, the risk to have a baby with Down syndrome is 1 in 274.     - At age 34 at midtrimester, the risk to have a baby with any chromosome abnormality is 1 in 135.       Georgette had maternal serum screening earlier in pregnancy.       Non-invasive Prenatal Testing (NIPT)    Maternal plasma cell-free DNA testing    Screens for fetal trisomy 21, trisomy 13, trisomy 18, and sex chromosome aneuploidy    First trimester ultrasound with nuchal translucency and nasal bone assessment was not performed in this pregnancy, to our knowledge.    Georgette had an Innatal NIPT  test earlier in pregnancy; we reviewed the results today, which are normal for chromosome 13, chromosome 18 and chromosome 21 (no aneuploidy detected)    Given the accuracy of this test, these results greatly decrease the chance for certain fetal chromosome abnormalities    We discussed the limitations of normal NIPT results    MSAFP (after 15 weeks for open neural tube defect screening) results were within normal limits, which decreased the risk of an open neural tube defect in the pregnancy to 1 in 10,000.       Testing Options:   We discussed the following options:   Non-invasive Prenatal Testing (NIPT)    Maternal plasma cell-free DNA testing; first trimester ultrasound with nuchal translucency and nasal bone assessment is recommended, when appropriate    Screens for fetal trisomy 21, trisomy 13, trisomy 18, and sex chromosome aneuploidy    Cannot screen for open neural tube defects; maternal serum AFP after 15 weeks is recommended       Genetic Amniocentesis    Invasive procedure typically performed in the second trimester by which amniotic fluid is obtained for the purpose of chromosome analysis and/or other prenatal genetic analysis    Diagnostic results; >99% sensitivity for fetal chromosome abnormalities    AFAFP measurement tests for open neural tube defects       Comprehensive (Level II) ultrasound: Detailed ultrasound performed between 18-22 weeks gestation to screen for major birth defects and markers for aneuploidy.        We reviewed the benefits and limitations of this testing.  Screening tests provide a risk assessment specific to the pregnancy for certain fetal chromosome abnormalities, but cannot definitively diagnose or exclude a fetal chromosome abnormality.  Follow-up genetic counseling and consideration of diagnostic testing is recommended with any abnormal screening result.     Diagnostic tests carry inherent risks- including risk of miscarriage- that require careful consideration.  These  tests can detect fetal chromosome abnormalities with greater than 99% certainty.  Results can be compromised by maternal cell contamination or mosaicism, and are limited by the resolution of cytogenetic G-banding technology.  There is no screening nor diagnostic test that can detect all forms of birth defects or mental disability.    It was a pleasure to be involved with Georgette s care. Face-to-face time of the meeting was 25 minutes.    Jomar Renae MS, [facility]  Licensed Genetic Counselor  Phone: [phone]  Pager: [phone]

## 2019-05-09 NOTE — PROGRESS NOTES
"Feels pretty well.  States that nausea and vomiting has improved \"a lot\" in the past 2 weeks.  Is currently only having intermittent nausea.  Fetal movement: positive   Denies loss of fluid/vb/contractions  Anatomy ultrasound is being done later today at [facility]  GCT visit between 24-28 weeks, handout provided, reminded of longer appointment  Round ligament pain and comfort measures reviewed  Return to clinic 4 weeks  Offered to return sooner than 4 weeks if desired    Lindy MCCURDY, MARCO      "

## 2019-05-17 ENCOUNTER — MYC MEDICAL ADVICE (OUTPATIENT)
Dept: MIDWIFE SERVICES | Facility: CLINIC | Age: 34
End: 2019-05-17

## 2019-05-17 NOTE — TELEPHONE ENCOUNTER
"Please see my chart message. Routing to [facility] to review. Pt has appointment scheduled 5/30/19.     5/9/19 Feels pretty well.  States that nausea and vomiting has improved \"a lot\" in the past 2 weeks.  Is currently only having intermittent nausea.  Fetal movement: positive   Denies loss of fluid/vb/contractions  Anatomy ultrasound is being done later today at [facility]  GCT visit between 24-28 weeks, handout provided, reminded of longer appointment  Round ligament pain and comfort measures reviewed  Return to clinic 4 weeks  Offered to return sooner than 4 weeks if desired    "

## 2019-05-29 PROBLEM — O99.212 OBESITY AFFECTING PREGNANCY IN SECOND TRIMESTER: Status: ACTIVE | Noted: 2019-02-28

## 2019-05-29 PROBLEM — O09.519 SUPERVISION OF HIGH-RISK PREGNANCY OF ELDERLY PRIMIGRAVIDA: Chronic | Status: ACTIVE | Noted: 2019-02-28

## 2019-05-29 NOTE — PROGRESS NOTES
Feels good overall. Here alone today. She is getting  in 3 weeks!  Fetal movement: positive   Denies loss of fluid/vb/contractions  Anatomy ultrasound performed at [facility] - f/u scheduled for suboptimally seen anatomy done today - still not all seen so another f/u is scheduled, having a Boy, Placenta: fundal  GCT visit between 24-28 weeks, handout provided, reminded of longer appointment  We discussed [facility] recommendations for  surveillance and IOL at 39-40w. I explained that it may change depending on health throughout pregnancy. She states she is aware.    Round ligament pain and comfort measures reviewed  Return to clinic 4 weeks    Mandy Canales, SCOT, APRN, CNM

## 2019-05-30 ENCOUNTER — RECORDS - HEALTHEAST (OUTPATIENT)
Dept: ADMINISTRATIVE | Facility: OTHER | Age: 34
End: 2019-05-30

## 2019-05-30 ENCOUNTER — OFFICE VISIT (OUTPATIENT)
Dept: MATERNAL FETAL MEDICINE | Facility: CLINIC | Age: 34
End: 2019-05-30
Attending: OBSTETRICS & GYNECOLOGY
Payer: COMMERCIAL

## 2019-05-30 ENCOUNTER — PRENATAL OFFICE VISIT (OUTPATIENT)
Dept: MIDWIFE SERVICES | Facility: CLINIC | Age: 34
End: 2019-05-30
Payer: COMMERCIAL

## 2019-05-30 ENCOUNTER — HOSPITAL ENCOUNTER (OUTPATIENT)
Dept: ULTRASOUND IMAGING | Facility: CLINIC | Age: 34
Discharge: HOME | End: 2019-05-30
Attending: OBSTETRICS & GYNECOLOGY | Admitting: OBSTETRICS & GYNECOLOGY
Payer: COMMERCIAL

## 2019-05-30 VITALS — BODY MASS INDEX: 41.04 KG/M2 | WEIGHT: 286 LBS | DIASTOLIC BLOOD PRESSURE: 60 MMHG | SYSTOLIC BLOOD PRESSURE: 116 MMHG

## 2019-05-30 DIAGNOSIS — O09.512 SUPERVISION OF ELDERLY PRIMIGRAVIDA IN SECOND TRIMESTER: ICD-10-CM

## 2019-05-30 DIAGNOSIS — O09.521 ELDERLY MULTIGRAVIDA IN FIRST TRIMESTER: ICD-10-CM

## 2019-05-30 DIAGNOSIS — O99.212 MATERNAL OBESITY SYNDROME, ANTEPARTUM, SECOND TRIMESTER: ICD-10-CM

## 2019-05-30 DIAGNOSIS — O09.519 SUPERVISION OF HIGH-RISK PREGNANCY OF ELDERLY PRIMIGRAVIDA: Primary | ICD-10-CM

## 2019-05-30 DIAGNOSIS — O99.212 OBESITY AFFECTING PREGNANCY IN SECOND TRIMESTER: ICD-10-CM

## 2019-05-30 DIAGNOSIS — Z3A.21 21 WEEKS GESTATION OF PREGNANCY: ICD-10-CM

## 2019-05-30 PROCEDURE — 76816 OB US FOLLOW-UP PER FETUS: CPT

## 2019-05-30 PROCEDURE — 99207 ZZC PRENATAL VISIT: CPT | Performed by: ADVANCED PRACTICE MIDWIFE

## 2019-05-30 NOTE — PATIENT INSTRUCTIONS
Round Ligament Pain    Pregnancy can entail many normal discomforts. One of those discomforts may be round ligament pain. Round ligament pain occurs as your uterus and your baby grow and the muscles begin to stretch more in your abdomen. Round ligament pain is normal and not dangerous but can be very uncomfortable      Round ligament pain is typically described as an unpleasant sensation that ranges from a sharp knifelike pain to dull intermittent pain in the lower abdominal/suprapubic area of a pregnant woman      Virtually all pregnant women will experience this pain at some point during their pregnancies      It typically manifests between 16 and 20 weeks of pregnancy and can be incredibly bothersome especially for women who remain very active during their pregnancies       Please discuss with your midwife if you are having this type of pain; sometimes the pain can be associated with other medical conditions so it is important for us to assess you just to make sure    Comfort measures    There are certain things you can do to cope with round ligament pain and ease the discomfort you are having      Pregnancy support belt      Tylenol      Hot/ice packs      Baths       Exercise such as yoga and swimming that help stretch muscles      Prenatal massage      Reflexology to waist and pelvic joints       Positioning such as side-lying and hands and knees, make sure your abdomen is  well supported with pillows while doing different positions    Calcium Rich Foods    All premenopausal or women of child-bearing age need to get at least 1000 mg of calcium per day from diet and/or supplementation. Post menopausal women should get at least 1200 mg from diet and/or supplementation.    Food     Amount   Calcium (mg)  Dairy  Yogurt     1 cup   400   Ice Cream/Frozen yogurt 1/2 cup  100  Milk 1% or 2%   1 cup   300  Cheese   1 oz    195-335   Cottage cheese 2%  1 cup   155  Fruits  Orange   1 medium  60  Pear    1  medium  19  Raisins    1/4 cup  18  Vegetables-fresh and/or cooked  Broccoli   1/2 cup  36  Bok Soraida   1/2 cup  79  Ziyad greens   1/2 cup  15  Carrots    1 medium  19  Iceberg lettuce  4 leaves  16  Nuts, Beans, Seeds  Canned baked beans   1/2 cup  100  Canned red kidney beans 1/2 cup  25-80  Canned navy beans  1/2 cup  64  Tofu with calcium sulfate  1/2 cup  150  Soybeans   1 cup   175  Soy milk    1 cup   10  Almonds    1/4 cup  74  Protein  [place]   3 oz   181  Tuna, canned   3 oz   10  Turkey breast   3.5 oz   10  Egg (large)   1 egg   27  Peanut Butter   2 tbsp   11  Beef     3 oz   9  Chicken   1 leg   15  Grain  Amaranth (uncooked)  1 cup   298  Corn tortilla    1 medium  42  Rice (cooked)   1/2 cup  20  Waffle (frozen ~7in)  1   179  Bagel    1   23  Calcium fortified foods/drinks  Orange juice   1 cup   300  Cereal + milk   3/4 cup + 1/2 cup 400  Rice milk   1 cup   300  Lactaid milk    1 cup

## 2019-05-30 NOTE — PROGRESS NOTES
Please see ultrasound report under imaging tab for details on ultrasound performed today.    Nathalie Kumar MD  , OB/GYN  Maternal-Fetal Medicine  [email or web address]  [phone] (Academic office)  [phone] (Pager)

## 2019-06-17 DIAGNOSIS — Z36.9 ENCOUNTER FOR ANTENATAL SCREENING OF MOTHER: Primary | ICD-10-CM

## 2019-06-20 NOTE — PROGRESS NOTES
Feels well.   Fetal movement: positive Couple movements a day. Does not really feel movement with hand on surface of abdomen.   Ultrasound repeated today with MFM due to suboptimal views on previous US.  Reports everything went well. All the views were seen that needed to be. No further F/U at this time. Will wait for official report.  Denies loss of fluid/vb/contractions  Had small amount of brown discharge noticed on Saturday. Discussed warning s/sx and when to call  GCT today  Tdap next visit; reviewed CDC recommendations and partner/family vaccination recommended as well  Need for Rhogam? No,  O+     Return to clinic 3 weeks    Salma MCCURDY CNM

## 2019-06-24 ENCOUNTER — OFFICE VISIT (OUTPATIENT)
Dept: MATERNAL FETAL MEDICINE | Facility: CLINIC | Age: 34
End: 2019-06-24
Attending: OBSTETRICS & GYNECOLOGY
Payer: COMMERCIAL

## 2019-06-24 ENCOUNTER — PRENATAL OFFICE VISIT (OUTPATIENT)
Dept: MIDWIFE SERVICES | Facility: CLINIC | Age: 34
End: 2019-06-24
Payer: COMMERCIAL

## 2019-06-24 ENCOUNTER — HOSPITAL ENCOUNTER (OUTPATIENT)
Dept: ULTRASOUND IMAGING | Facility: CLINIC | Age: 34
Discharge: HOME | End: 2019-06-24
Attending: OBSTETRICS & GYNECOLOGY | Admitting: OBSTETRICS & GYNECOLOGY
Payer: COMMERCIAL

## 2019-06-24 ENCOUNTER — RECORDS - HEALTHEAST (OUTPATIENT)
Dept: ADMINISTRATIVE | Facility: OTHER | Age: 34
End: 2019-06-24

## 2019-06-24 VITALS — BODY MASS INDEX: 41.47 KG/M2 | WEIGHT: 289 LBS | DIASTOLIC BLOOD PRESSURE: 62 MMHG | SYSTOLIC BLOOD PRESSURE: 110 MMHG

## 2019-06-24 DIAGNOSIS — O99.211 OBESITY AFFECTING PREGNANCY IN FIRST TRIMESTER: ICD-10-CM

## 2019-06-24 DIAGNOSIS — O09.519 SUPERVISION OF HIGH-RISK PREGNANCY OF ELDERLY PRIMIGRAVIDA: ICD-10-CM

## 2019-06-24 DIAGNOSIS — Z36.9 ENCOUNTER FOR ANTENATAL SCREENING OF MOTHER: ICD-10-CM

## 2019-06-24 DIAGNOSIS — O99.212 MATERNAL OBESITY SYNDROME, ANTEPARTUM, SECOND TRIMESTER: ICD-10-CM

## 2019-06-24 DIAGNOSIS — E66.01 MORBID OBESITY (H): ICD-10-CM

## 2019-06-24 LAB
ERYTHROCYTE [DISTWIDTH] IN BLOOD BY AUTOMATED COUNT: 13 % (ref 10–15)
GLUCOSE 1H P 50 G GLC PO SERPL-MCNC: 100 MG/DL (ref 60–129)
HCT VFR BLD AUTO: 32.7 % (ref 35–47)
HGB BLD-MCNC: 10.8 G/DL (ref 11.7–15.7)
HGB_EXT (HISTORICAL CONVERSION): 10.8
MCH RBC QN AUTO: 30 PG (ref 26.5–33)
MCHC RBC AUTO-ENTMCNC: 33 G/DL (ref 31.5–36.5)
MCV RBC AUTO: 91 FL (ref 78–100)
PLATELET # BLD AUTO: 264 10E9/L (ref 150–450)
PLT_EXT - HISTORICAL: 264
RBC # BLD AUTO: 3.6 10E12/L (ref 3.8–5.2)
WBC # BLD AUTO: 8.1 10E9/L (ref 4–11)

## 2019-06-24 PROCEDURE — 82950 GLUCOSE TEST: CPT | Performed by: ADVANCED PRACTICE MIDWIFE

## 2019-06-24 PROCEDURE — 85027 COMPLETE CBC AUTOMATED: CPT | Performed by: ADVANCED PRACTICE MIDWIFE

## 2019-06-24 PROCEDURE — 36415 COLL VENOUS BLD VENIPUNCTURE: CPT | Performed by: ADVANCED PRACTICE MIDWIFE

## 2019-06-24 PROCEDURE — 99207 ZZC PRENATAL VISIT: CPT | Performed by: ADVANCED PRACTICE MIDWIFE

## 2019-06-24 PROCEDURE — 76816 OB US FOLLOW-UP PER FETUS: CPT

## 2019-06-24 NOTE — PROGRESS NOTES
"Please see \"Imaging\" tab under \"Chart Review\" for details of today's US.    Elizabeth Greer, DO    "

## 2019-07-02 ENCOUNTER — MYC MEDICAL ADVICE (OUTPATIENT)
Dept: MIDWIFE SERVICES | Facility: CLINIC | Age: 34
End: 2019-07-02

## 2019-07-02 NOTE — TELEPHONE ENCOUNTER
Rx for breast pump printed and signed by Mandy Canales. Pt would like Rx mailed to her home. Rx sent.

## 2019-07-11 DIAGNOSIS — M51.369 BULGE OF LUMBAR DISC WITHOUT MYELOPATHY: Primary | ICD-10-CM

## 2019-07-12 NOTE — TELEPHONE ENCOUNTER
"Medication Request for: Lidoderm 5% patches     Patient currently taking: uses \"randomly\" for back pain.    Patient is also currently pregnant.   She states her midwife gave permission for her to use the patches.  Prescription last written on 10/18/18 by Dr. Yeo  Sig: Apply up to 2 patches to painful area at once for up to12 hr within a 24 hr period. Remove after 12 hrs.    Last Fill Quantity: 30 with # refills: 0     Last Office Visit by provider: 11/15/18  Patient desires to have prescription  E prescribed.  Location:  Pharmacy selected in [facility].    Phone number patient can be reached at: Home number on file [phone] (home)    Can we leave a detailed message on this number? NO consent on file    Medication requests may take up to 3 business days for a response  Has patient been notified of this No    Please advise on refill. Patient does not need a return call unless there is a problem filling it.     JOI Monroe RN      "

## 2019-07-15 RX ORDER — LIDOCAINE 50 MG/G
2 PATCH TOPICAL EVERY 24 HOURS
Qty: 60 PATCH | Refills: 3 | Status: SHIPPED | OUTPATIENT
Start: 2019-07-15 | End: 2023-07-19

## 2019-07-15 NOTE — PROGRESS NOTES
Feels well  Fetal movement: positive, denies loss of fluid/vb/contractions  Tdap given: Yes  Rhogam: No O+  Anti Treponema drawn: Yes  Reviewed PTL precautions and S&S of PIH, patient verbalizes understanding and what to report  Hospital Registration reminder-online    Return to clinic 2 weeks    Growth US with MFM at 32 weeks    Salma MCCURDY CNM

## 2019-07-19 ENCOUNTER — PRENATAL OFFICE VISIT (OUTPATIENT)
Dept: MIDWIFE SERVICES | Facility: CLINIC | Age: 34
End: 2019-07-19
Payer: COMMERCIAL

## 2019-07-19 VITALS — BODY MASS INDEX: 41.96 KG/M2 | SYSTOLIC BLOOD PRESSURE: 100 MMHG | DIASTOLIC BLOOD PRESSURE: 62 MMHG | WEIGHT: 292.4 LBS

## 2019-07-19 DIAGNOSIS — O09.519 SUPERVISION OF HIGH-RISK PREGNANCY OF ELDERLY PRIMIGRAVIDA: ICD-10-CM

## 2019-07-19 PROCEDURE — 90471 IMMUNIZATION ADMIN: CPT | Performed by: ADVANCED PRACTICE MIDWIFE

## 2019-07-19 PROCEDURE — 36415 COLL VENOUS BLD VENIPUNCTURE: CPT | Performed by: ADVANCED PRACTICE MIDWIFE

## 2019-07-19 PROCEDURE — 99207 ZZC PRENATAL VISIT: CPT | Performed by: ADVANCED PRACTICE MIDWIFE

## 2019-07-19 PROCEDURE — 90715 TDAP VACCINE 7 YRS/> IM: CPT | Performed by: ADVANCED PRACTICE MIDWIFE

## 2019-07-19 PROCEDURE — 86780 TREPONEMA PALLIDUM: CPT | Performed by: ADVANCED PRACTICE MIDWIFE

## 2019-07-19 NOTE — NURSING NOTE
Screening Questionnaire for Adult Immunization    Are you sick today?   No   Do you have allergies to medications, food, a vaccine component or latex?   Penicillin   Have you ever had a serious reaction after receiving a vaccination?   No   Do you have a long-term health problem with heart disease, lung disease, asthma, kidney disease, metabolic disease (e.g. diabetes), anemia, or other blood disorder?   No   Do you have cancer, leukemia, HIV/AIDS, or any other immune system problem?   No   In the past 3 months, have you taken medications that affect  your immune system, such as prednisone, other steroids, or anticancer drugs; drugs for the treatment of rheumatoid arthritis, Crohn s disease, or psoriasis; or have you had radiation treatments?   No   Have you had a seizure, or a brain or other nervous system problem?   No   During the past year, have you received a transfusion of blood or blood     products, or been given immune (gamma) globulin or antiviral drug?   No   For women: Are you pregnant or is there a chance you could become        pregnant during the next month?   Yes   Have you received any vaccinations in the past 4 weeks?   No     Immunization questionnaire was positive for at least one answer.  Notified Salma Toussaint.        Per orders of Salma Toussaint, injection of Tdap given by Gratn Hyde. Patient instructed to remain in clinic for 15 minutes afterwards, and to report any adverse reaction to me immediately.       Screening performed by Grant Hyde on 7/19/2019 at 3:43 PM.

## 2019-07-19 NOTE — PATIENT INSTRUCTIONS

## 2019-07-23 LAB — T PALLIDUM AB SER QL: NONREACTIVE

## 2019-07-24 ENCOUNTER — TELEPHONE (OUTPATIENT)
Dept: MIDWIFE SERVICES | Facility: CLINIC | Age: 34
End: 2019-07-24

## 2019-07-24 DIAGNOSIS — O09.91 SUPERVISION OF HIGH RISK PREGNANCY IN FIRST TRIMESTER: Primary | ICD-10-CM

## 2019-07-25 DIAGNOSIS — O99.210 MATERNAL OBESITY AFFECTING PREGNANCY, ANTEPARTUM: Primary | ICD-10-CM

## 2019-07-26 ENCOUNTER — TELEPHONE (OUTPATIENT)
Dept: MATERNAL FETAL MEDICINE | Facility: CLINIC | Age: 34
End: 2019-07-26

## 2019-07-26 ENCOUNTER — AMBULATORY - HEALTHEAST (OUTPATIENT)
Dept: MATERNAL FETAL MEDICINE | Facility: HOSPITAL | Age: 34
End: 2019-07-26

## 2019-07-26 DIAGNOSIS — O26.90 PREGNANCY, ANTEPARTUM, COMPLICATIONS: ICD-10-CM

## 2019-07-29 ENCOUNTER — AMBULATORY - HEALTHEAST (OUTPATIENT)
Dept: MATERNAL FETAL MEDICINE | Facility: HOSPITAL | Age: 34
End: 2019-07-29

## 2019-07-29 ENCOUNTER — OFFICE VISIT - HEALTHEAST (OUTPATIENT)
Dept: MATERNAL FETAL MEDICINE | Facility: HOSPITAL | Age: 34
End: 2019-07-29

## 2019-07-29 ENCOUNTER — RECORDS - HEALTHEAST (OUTPATIENT)
Dept: ADMINISTRATIVE | Facility: OTHER | Age: 34
End: 2019-07-29

## 2019-07-29 ENCOUNTER — RECORDS - HEALTHEAST (OUTPATIENT)
Dept: ULTRASOUND IMAGING | Facility: HOSPITAL | Age: 34
End: 2019-07-29

## 2019-07-29 DIAGNOSIS — O26.90 PREGNANCY RELATED CONDITIONS, UNSPECIFIED, UNSPECIFIED TRIMESTER: ICD-10-CM

## 2019-07-29 DIAGNOSIS — O99.210 OBESITY IN PREGNANCY: ICD-10-CM

## 2019-07-30 ENCOUNTER — PRENATAL OFFICE VISIT (OUTPATIENT)
Dept: MIDWIFE SERVICES | Facility: CLINIC | Age: 34
End: 2019-07-30
Payer: COMMERCIAL

## 2019-07-30 VITALS — SYSTOLIC BLOOD PRESSURE: 128 MMHG | WEIGHT: 293 LBS | DIASTOLIC BLOOD PRESSURE: 77 MMHG | BODY MASS INDEX: 42.18 KG/M2

## 2019-07-30 DIAGNOSIS — O09.519 SUPERVISION OF HIGH-RISK PREGNANCY OF ELDERLY PRIMIGRAVIDA: Primary | ICD-10-CM

## 2019-07-30 DIAGNOSIS — O99.212 OBESITY AFFECTING PREGNANCY IN SECOND TRIMESTER: ICD-10-CM

## 2019-07-30 DIAGNOSIS — Z3A.30 30 WEEKS GESTATION OF PREGNANCY: ICD-10-CM

## 2019-07-30 PROCEDURE — 99207 ZZC PRENATAL VISIT: CPT | Performed by: NURSE PRACTITIONER

## 2019-07-30 RX ORDER — DIPHENHYDRAMINE HCL 25 MG
CAPSULE ORAL
COMMUNITY
Start: 2019-06-01 | End: 2020-09-21

## 2019-07-30 NOTE — PATIENT INSTRUCTIONS
Iron Rich Foods  Iron is an important mineral for good health. Without enough iron you are more prone to getting sick and feeling tired.  Babies/children need iron for healthy brain development.    Recommended amount of Iron for Women  Ages 14-18  15 mg  Ages 19-49  18 mg   Over 50  8 mg  Pregnancy  27 mg  Breastfeeding  9 mg  Vegetarians   33 mg    There is extra iron in multivitamins and prenatal vitamins. Sometimes women can have a lower hemoglobin (part of your blood that carries oxygen) after menses and when pregnant. Adding some of these iron rich foods to your diet can help you feel better, bring your iron levels up without supplementing with iron pills or liquids. If you are already supplementing with iron these food will only help!    Iron Rich Foods:    Meat (2.5 oz servings range anywhere from 0.6-21 mg of Iron)  Clams      Liver (chicken/pork)     Oysters    Mussels       Beef liver    Beef       Shrimp     Sardines     Tuna/herring/mackerel   Chicken  Pork     Turkey/Lamb  [place]     Flounder/sole  Vegtables  Dark green leafy vegetables like kale/mixed greens /swiss chard  Broccoli      Asparagus  Green peas      Beets  Potato (with skin)  Beans   Chickpeas      [place] Beans  Soy beans      Kidney beans   Lentils       Refried beans   Grains  Whole wheat bread     Bagels  Pasta (enriched)     Quinoa  Cereal       Shredded wheat  Cream of wheat  (12 mg)    Oatmeal  Pearled barley      Wheat germ (toasted)  Other  pumpkin seeds     Tofu  Raisins       Peanut butter  Tahini        Eggs  Sour Cherries      Prune juice

## 2019-07-30 NOTE — PROGRESS NOTES
Feels good.  Had an US at [facility] yesterday.  Results normal.  Repeat growth US in 4 weeks at [facility].  Weekly BPPs at 36 weeks.  Fetal Movement: positive, denies loss of fluid/vb, no contractions  Fetal kick counts reviewed and handout given  Reviewed PTL precautions and s/sx of preeclampsia; denies any S&S and aware of what to report    Return to clinic in 2 weeks OB visit    Marci Doshi [facility]     I, Marci Doshi, am serving as a scribe; to document services personally performed by Lindy MCCURDY CNM- based on data collection and the provider's statements to me.     Provider Disclosure:  I agree with above History, Review of Systems, Physical exam and Plan. I have reviewed the content of the documentation and have edited it as needed. I have personally performed the services documented here and the documentation accurately represents those services and the decisions I have made.    Lindy MCCURDY CNM

## 2019-08-05 PROBLEM — N93.8 DYSFUNCTIONAL UTERINE BLEEDING: Status: RESOLVED | Noted: 2018-03-27 | Resolved: 2019-08-05

## 2019-08-05 PROBLEM — O09.521 ELDERLY MULTIGRAVIDA IN FIRST TRIMESTER: Status: RESOLVED | Noted: 2019-02-28 | Resolved: 2019-08-05

## 2019-08-05 PROBLEM — O20.8 SUBCHORIONIC HEMORRHAGE OF PLACENTA IN FIRST TRIMESTER: Status: RESOLVED | Noted: 2019-02-28 | Resolved: 2019-08-05

## 2019-08-05 NOTE — PROGRESS NOTES
Feels well but having lots of back pain, talked with manager on unit about light duty and got approved, needs paper work filled out by us. Also brought [place] paperwork today. Would like an rx for pregnancy support belt. MFM US all WNL. Next in 2 weeks.  Fetal Movement: positive, denies loss of fluid/vb, no contractions  Light duty discussed and paper work signed per patient request, back pain in pregnancy added to problem list, recommend chiropractor care or Physical therapy as well to help with pain  Fetal kick counts/movement reviewed  Reviewed PTL precautions and s/sx of preeclampsia; denies any S&S and aware of what to report  Return to clinic 2 weeks    *ask about peds and breastfeeding class    CALLI Almendarez, CNM

## 2019-08-07 ENCOUNTER — HOSPITAL ENCOUNTER (OUTPATIENT)
Facility: CLINIC | Age: 34
End: 2019-08-07
Admitting: ADVANCED PRACTICE MIDWIFE
Payer: COMMERCIAL

## 2019-08-15 ENCOUNTER — TELEPHONE (OUTPATIENT)
Dept: MIDWIFE SERVICES | Facility: CLINIC | Age: 34
End: 2019-08-15

## 2019-08-15 ENCOUNTER — PRENATAL OFFICE VISIT (OUTPATIENT)
Dept: MIDWIFE SERVICES | Facility: CLINIC | Age: 34
End: 2019-08-15
Payer: COMMERCIAL

## 2019-08-15 VITALS — WEIGHT: 293 LBS | SYSTOLIC BLOOD PRESSURE: 112 MMHG | BODY MASS INDEX: 42.56 KG/M2 | DIASTOLIC BLOOD PRESSURE: 60 MMHG

## 2019-08-15 DIAGNOSIS — O09.523 SUPERVISION OF HIGH RISK ELDERLY MULTIGRAVIDA IN THIRD TRIMESTER: Primary | ICD-10-CM

## 2019-08-15 DIAGNOSIS — Z3A.32 32 WEEKS GESTATION OF PREGNANCY: ICD-10-CM

## 2019-08-15 DIAGNOSIS — M54.9 BACK PAIN IN PREGNANCY: ICD-10-CM

## 2019-08-15 DIAGNOSIS — O99.891 BACK PAIN IN PREGNANCY: ICD-10-CM

## 2019-08-15 PROBLEM — O99.213 OBESITY AFFECTING PREGNANCY IN THIRD TRIMESTER: Status: ACTIVE | Noted: 2019-02-28

## 2019-08-15 PROBLEM — O99.212 OBESITY AFFECTING PREGNANCY IN SECOND TRIMESTER: Status: RESOLVED | Noted: 2019-02-28 | Resolved: 2019-08-15

## 2019-08-15 PROCEDURE — 99207 ZZC PRENATAL VISIT: CPT | Performed by: ADVANCED PRACTICE MIDWIFE

## 2019-08-15 PROCEDURE — 59426 ANTEPARTUM CARE ONLY: CPT | Performed by: ADVANCED PRACTICE MIDWIFE

## 2019-08-20 ENCOUNTER — HOSPITAL ENCOUNTER (OUTPATIENT)
Dept: MEDSURG UNIT | Facility: CLINIC | Age: 34
Discharge: HOME | End: 2019-08-20
Attending: STUDENT IN AN ORGANIZED HEALTH CARE EDUCATION/TRAINING PROGRAM | Admitting: STUDENT IN AN ORGANIZED HEALTH CARE EDUCATION/TRAINING PROGRAM

## 2019-08-20 ENCOUNTER — MYC MEDICAL ADVICE (OUTPATIENT)
Dept: OBGYN | Facility: CLINIC | Age: 34
End: 2019-08-20

## 2019-08-20 LAB
ALBUMIN UR-MCNC: NEGATIVE MG/DL
APPEARANCE UR: CLEAR
BACTERIA #/AREA URNS HPF: ABNORMAL HPF
BILIRUB UR QL STRIP: NEGATIVE
COLOR UR AUTO: YELLOW
GLUCOSE UR STRIP-MCNC: NEGATIVE MG/DL
HGB UR QL STRIP: ABNORMAL
HYALINE CASTS #/AREA URNS LPF: ABNORMAL LPF
KETONES UR STRIP-MCNC: NEGATIVE MG/DL
LEUKOCYTE ESTERASE UR QL STRIP: NEGATIVE
MUCOUS THREADS #/AREA URNS LPF: ABNORMAL LPF
NITRATE UR QL: NEGATIVE
PH UR STRIP: 7 [PH] (ref 4.5–8)
RBC #/AREA URNS AUTO: ABNORMAL HPF
SP GR UR STRIP: 1 (ref 1–1.03)
SQUAMOUS #/AREA URNS AUTO: ABNORMAL LPF
UROBILINOGEN UR STRIP-ACNC: ABNORMAL
WBC #/AREA URNS AUTO: ABNORMAL HPF

## 2019-08-20 ASSESSMENT — MIFFLIN-ST. JEOR: SCORE: 2107.9

## 2019-08-20 NOTE — TELEPHONE ENCOUNTER
Patient calling today stating the [place] workability form needs to be filled out completely - form pulled from drawer and given back to midwives.

## 2019-08-21 ENCOUNTER — COMMUNICATION - HEALTHEAST (OUTPATIENT)
Dept: ADMINISTRATIVE | Facility: CLINIC | Age: 34
End: 2019-08-21

## 2019-08-23 LAB
APPEARANCE STONE: NORMAL
COMPN STONE: NORMAL
NUMBER STONE: NORMAL
SIZE STONE: NORMAL MM
SPECIMEN WT: NORMAL MG

## 2019-08-26 ENCOUNTER — RECORDS - HEALTHEAST (OUTPATIENT)
Dept: ADMINISTRATIVE | Facility: OTHER | Age: 34
End: 2019-08-26

## 2019-08-26 ENCOUNTER — OFFICE VISIT - HEALTHEAST (OUTPATIENT)
Dept: MATERNAL FETAL MEDICINE | Facility: HOSPITAL | Age: 34
End: 2019-08-26

## 2019-08-26 ENCOUNTER — RECORDS - HEALTHEAST (OUTPATIENT)
Dept: ULTRASOUND IMAGING | Facility: HOSPITAL | Age: 34
End: 2019-08-26

## 2019-08-26 DIAGNOSIS — O99.213 OBESITY AFFECTING PREGNANCY IN THIRD TRIMESTER: ICD-10-CM

## 2019-08-26 DIAGNOSIS — O99.210 OBESITY COMPLICATING PREGNANCY, UNSPECIFIED TRIMESTER: ICD-10-CM

## 2019-09-02 ENCOUNTER — AMBULATORY - HEALTHEAST (OUTPATIENT)
Dept: MIDWIFE SERVICES | Facility: CLINIC | Age: 34
End: 2019-09-02

## 2019-09-03 ENCOUNTER — RECORDS - HEALTHEAST (OUTPATIENT)
Dept: ADMINISTRATIVE | Facility: OTHER | Age: 34
End: 2019-09-03

## 2019-09-03 ENCOUNTER — PRENATAL OFFICE VISIT - HEALTHEAST (OUTPATIENT)
Dept: MIDWIFE SERVICES | Facility: CLINIC | Age: 34
End: 2019-09-03

## 2019-09-03 DIAGNOSIS — Z86.19 HISTORY OF HERPES GENITALIS: ICD-10-CM

## 2019-09-03 DIAGNOSIS — O99.213 OBESITY AFFECTING PREGNANCY IN THIRD TRIMESTER: ICD-10-CM

## 2019-09-03 DIAGNOSIS — O09.519 SUPERVISION OF HIGH-RISK PREGNANCY OF ELDERLY PRIMIGRAVIDA: ICD-10-CM

## 2019-09-03 ASSESSMENT — MIFFLIN-ST. JEOR: SCORE: 2130.58

## 2019-09-03 ASSESSMENT — EDINBURGH POSTNATAL DEPRESSION SCALE (EPDS): TOTAL SCORE: 5

## 2019-09-09 ENCOUNTER — RECORDS - HEALTHEAST (OUTPATIENT)
Dept: ADMINISTRATIVE | Facility: OTHER | Age: 34
End: 2019-09-09

## 2019-09-09 ENCOUNTER — OFFICE VISIT - HEALTHEAST (OUTPATIENT)
Dept: MATERNAL FETAL MEDICINE | Facility: HOSPITAL | Age: 34
End: 2019-09-09

## 2019-09-09 ENCOUNTER — PRENATAL OFFICE VISIT - HEALTHEAST (OUTPATIENT)
Dept: MIDWIFE SERVICES | Facility: CLINIC | Age: 34
End: 2019-09-09

## 2019-09-09 ENCOUNTER — RECORDS - HEALTHEAST (OUTPATIENT)
Dept: ULTRASOUND IMAGING | Facility: HOSPITAL | Age: 34
End: 2019-09-09

## 2019-09-09 DIAGNOSIS — O99.213 OBESITY AFFECTING PREGNANCY IN THIRD TRIMESTER: ICD-10-CM

## 2019-09-09 DIAGNOSIS — O99.213 OBESITY COMPLICATING PREGNANCY, THIRD TRIMESTER: ICD-10-CM

## 2019-09-09 DIAGNOSIS — O09.519 SUPERVISION OF HIGH-RISK PREGNANCY OF ELDERLY PRIMIGRAVIDA: ICD-10-CM

## 2019-09-09 DIAGNOSIS — O99.013 ANEMIA DURING PREGNANCY IN THIRD TRIMESTER: ICD-10-CM

## 2019-09-09 LAB
GROUP B STREP PCR: NEGATIVE
HGB BLD-MCNC: 10.4 G/DL (ref 12–16)

## 2019-09-09 ASSESSMENT — MIFFLIN-ST. JEOR: SCORE: 2138.74

## 2019-09-10 ENCOUNTER — COMMUNICATION - HEALTHEAST (OUTPATIENT)
Dept: MIDWIFE SERVICES | Facility: CLINIC | Age: 34
End: 2019-09-10

## 2019-09-10 ENCOUNTER — AMBULATORY - HEALTHEAST (OUTPATIENT)
Dept: MIDWIFE SERVICES | Facility: CLINIC | Age: 34
End: 2019-09-10

## 2019-09-10 LAB
ALLERGIC TO PENICILLIN: YES
GP B STREP DNA SPEC QL NAA+PROBE: NEGATIVE

## 2019-09-13 ENCOUNTER — HOSPITAL ENCOUNTER (OUTPATIENT)
Facility: CLINIC | Age: 34
Discharge: HOME | End: 2019-09-14
Attending: OBSTETRICS & GYNECOLOGY | Admitting: OBSTETRICS & GYNECOLOGY
Payer: COMMERCIAL

## 2019-09-13 PROBLEM — Z36.89 ENCOUNTER FOR TRIAGE IN PREGNANT PATIENT: Status: ACTIVE | Noted: 2019-09-13

## 2019-09-13 LAB
ALBUMIN UR-MCNC: NEGATIVE MG/DL
AMORPH CRY #/AREA URNS HPF: ABNORMAL /HPF
APPEARANCE UR: ABNORMAL
BACTERIA #/AREA URNS HPF: ABNORMAL /HPF
BILIRUB UR QL STRIP: NEGATIVE
COLOR UR AUTO: ABNORMAL
GLUCOSE UR STRIP-MCNC: NEGATIVE MG/DL
HGB UR QL STRIP: ABNORMAL
KETONES UR STRIP-MCNC: NEGATIVE MG/DL
LEUKOCYTE ESTERASE UR QL STRIP: NEGATIVE
MUCOUS THREADS #/AREA URNS LPF: PRESENT /LPF
NITRATE UR QL: NEGATIVE
PH UR STRIP: 7 PH (ref 5–7)
RBC #/AREA URNS AUTO: 9 /HPF (ref 0–2)
SOURCE: ABNORMAL
SP GR UR STRIP: 1.01 (ref 1–1.03)
SQUAMOUS #/AREA URNS AUTO: 9 /HPF (ref 0–1)
UROBILINOGEN UR STRIP-MCNC: NORMAL MG/DL (ref 0–2)
WBC #/AREA URNS AUTO: 2 /HPF (ref 0–5)

## 2019-09-13 PROCEDURE — 59025 FETAL NON-STRESS TEST: CPT

## 2019-09-13 PROCEDURE — 81001 URINALYSIS AUTO W/SCOPE: CPT | Performed by: OBSTETRICS & GYNECOLOGY

## 2019-09-13 PROCEDURE — G0463 HOSPITAL OUTPT CLINIC VISIT: HCPCS | Mod: 25

## 2019-09-13 RX ORDER — VALACYCLOVIR HYDROCHLORIDE 500 MG/1
400 TABLET, FILM COATED ORAL 3 TIMES DAILY
COMMUNITY
End: 2020-09-21

## 2019-09-14 VITALS
DIASTOLIC BLOOD PRESSURE: 69 MMHG | HEIGHT: 70 IN | TEMPERATURE: 98 F | SYSTOLIC BLOOD PRESSURE: 118 MMHG | OXYGEN SATURATION: 94 % | RESPIRATION RATE: 18 BRPM | BODY MASS INDEX: 42.56 KG/M2

## 2019-09-14 PROCEDURE — 96375 TX/PRO/DX INJ NEW DRUG ADDON: CPT

## 2019-09-14 PROCEDURE — 96360 HYDRATION IV INFUSION INIT: CPT

## 2019-09-14 PROCEDURE — 96376 TX/PRO/DX INJ SAME DRUG ADON: CPT

## 2019-09-14 PROCEDURE — 96361 HYDRATE IV INFUSION ADD-ON: CPT

## 2019-09-14 PROCEDURE — 25000128 H RX IP 250 OP 636: Performed by: OBSTETRICS & GYNECOLOGY

## 2019-09-14 PROCEDURE — 96374 THER/PROPH/DIAG INJ IV PUSH: CPT

## 2019-09-14 PROCEDURE — 96366 THER/PROPH/DIAG IV INF ADDON: CPT

## 2019-09-14 PROCEDURE — 25800030 ZZH RX IP 258 OP 636: Performed by: OBSTETRICS & GYNECOLOGY

## 2019-09-14 PROCEDURE — G0463 HOSPITAL OUTPT CLINIC VISIT: HCPCS

## 2019-09-14 PROCEDURE — 25000132 ZZH RX MED GY IP 250 OP 250 PS 637: Performed by: OBSTETRICS & GYNECOLOGY

## 2019-09-14 RX ORDER — SODIUM CHLORIDE, SODIUM LACTATE, POTASSIUM CHLORIDE, CALCIUM CHLORIDE 600; 310; 30; 20 MG/100ML; MG/100ML; MG/100ML; MG/100ML
INJECTION, SOLUTION INTRAVENOUS CONTINUOUS
Status: DISCONTINUED | OUTPATIENT
Start: 2019-09-14 | End: 2019-09-14 | Stop reason: HOSPADM

## 2019-09-14 RX ORDER — ACETAMINOPHEN 325 MG/1
650 TABLET ORAL EVERY 4 HOURS PRN
Status: DISCONTINUED | OUTPATIENT
Start: 2019-09-14 | End: 2019-09-14 | Stop reason: HOSPADM

## 2019-09-14 RX ORDER — ONDANSETRON 2 MG/ML
4 INJECTION INTRAMUSCULAR; INTRAVENOUS EVERY 6 HOURS PRN
Status: DISCONTINUED | OUTPATIENT
Start: 2019-09-14 | End: 2019-09-14 | Stop reason: HOSPADM

## 2019-09-14 RX ORDER — NALOXONE HYDROCHLORIDE 0.4 MG/ML
.1-.4 INJECTION, SOLUTION INTRAMUSCULAR; INTRAVENOUS; SUBCUTANEOUS
Status: DISCONTINUED | OUTPATIENT
Start: 2019-09-14 | End: 2019-09-14 | Stop reason: HOSPADM

## 2019-09-14 RX ORDER — OXYCODONE HYDROCHLORIDE 5 MG/1
5-10 TABLET ORAL EVERY 4 HOURS PRN
Status: DISCONTINUED | OUTPATIENT
Start: 2019-09-14 | End: 2019-09-14 | Stop reason: HOSPADM

## 2019-09-14 RX ORDER — HYDROMORPHONE HYDROCHLORIDE 1 MG/ML
.2-.4 INJECTION, SOLUTION INTRAMUSCULAR; INTRAVENOUS; SUBCUTANEOUS
Status: DISCONTINUED | OUTPATIENT
Start: 2019-09-14 | End: 2019-09-14 | Stop reason: HOSPADM

## 2019-09-14 RX ADMIN — OXYCODONE HYDROCHLORIDE 5 MG: 5 TABLET ORAL at 01:00

## 2019-09-14 RX ADMIN — OXYCODONE HYDROCHLORIDE 5 MG: 5 TABLET ORAL at 01:25

## 2019-09-14 RX ADMIN — ACETAMINOPHEN 650 MG: 325 TABLET, FILM COATED ORAL at 02:41

## 2019-09-14 RX ADMIN — SODIUM CHLORIDE, POTASSIUM CHLORIDE, SODIUM LACTATE AND CALCIUM CHLORIDE: 600; 310; 30; 20 INJECTION, SOLUTION INTRAVENOUS at 05:26

## 2019-09-14 RX ADMIN — HYDROMORPHONE HYDROCHLORIDE 0.4 MG: 1 INJECTION, SOLUTION INTRAMUSCULAR; INTRAVENOUS; SUBCUTANEOUS at 06:37

## 2019-09-14 RX ADMIN — HYDROMORPHONE HYDROCHLORIDE 0.2 MG: 1 INJECTION, SOLUTION INTRAMUSCULAR; INTRAVENOUS; SUBCUTANEOUS at 05:25

## 2019-09-14 RX ADMIN — HYDROMORPHONE HYDROCHLORIDE 0.4 MG: 1 INJECTION, SOLUTION INTRAMUSCULAR; INTRAVENOUS; SUBCUTANEOUS at 08:55

## 2019-09-14 RX ADMIN — SODIUM CHLORIDE, POTASSIUM CHLORIDE, SODIUM LACTATE AND CALCIUM CHLORIDE: 600; 310; 30; 20 INJECTION, SOLUTION INTRAVENOUS at 01:23

## 2019-09-14 RX ADMIN — HYDROMORPHONE HYDROCHLORIDE 0.2 MG: 1 INJECTION, SOLUTION INTRAMUSCULAR; INTRAVENOUS; SUBCUTANEOUS at 04:34

## 2019-09-14 RX ADMIN — ONDANSETRON 4 MG: 2 INJECTION INTRAMUSCULAR; INTRAVENOUS at 04:37

## 2019-09-14 NOTE — PROVIDER NOTIFICATION
19 0011   Provider Notification   Provider Name/Title Dr Payton   Method of Notification Phone   Notification Reason Patient Arrived   Data: Patient presented to [facility]: 2019 11:01 PM.  Reason for maternal/fetal assessment is constant back pain, intermittent cramping. Patient reports mid bilateral back pain which was not relieved by 1000 mg tylenol @ 2145. Rating pain as 10/10.  Patient is a .  Prenatal record reviewed. Pregnancy  has been complicated by obesity, kidney stones, hx HSV.  Gestational Age 36w5d. VSS. Fetal movement present. Patient denies leaking of vaginal fluid/rupture of membranes, vaginal bleeding, pelvic pressure, nausea, vomiting, headache, visual disturbances, epigastric or URQ pain. Support person is not present.   Action: Verbal consent for EFM. Triage assessment completed. Bill of rights reviewed.  Response:  Category 1 FHR tracing. Occasional contraction not noted by patient. Cervix closed. Dr Payton notified of above and pt offerred choice of staying for pain control and IV hydration or discharging home. Pt decided on home and discharge orders received.    dressing/personal hygiene

## 2019-09-14 NOTE — DISCHARGE INSTRUCTIONS
Discharge Instruction for Undelivered Patients      You were seen for: Back pain, possible kidney stones  We Consulted: Dr Marilee Payton  You had (Test or Medicine):Urinalysis, fetal and uterine monitoring, pain management    Diet:   Drink 8 to 12 glasses of liquids (milk, juice, water) every day.  You may eat meals and snacks.     Activity:  Call your doctor or nurse midwife if your baby is moving less than usual.     Call your provider if you notice:  Swelling in your face or increased swelling in your hands or legs.  Headaches that are not relieved by Tylenol (acetaminophen).  Changes in your vision (blurring: seeing spots or stars.)  Nausea (sick to your stomach) and vomiting (throwing up).   Weight gain of 5 pounds or more per week.  Heartburn that doesn't go away.  Signs of bladder infection: pain when you urinate (use the toilet), need to go more often and more urgently.  The bag of rice (rupture of membranes) breaks, or you notice leaking in your underwear.  Bright red blood in your underwear.  Abdominal (lower belly) or stomach pain.  For first baby: Contractions (tightening) less than 5 minutes apart for one hour or more.      Follow-up:  As scheduled in the clinic.

## 2019-09-14 NOTE — PROVIDER NOTIFICATION
09/14/19 0405   Provider Notification   Provider Name/Title Dr payton   Method of Notification Phone   Notification Reason Pain   Dr Payton notified of continued pain and now nausea. Pt has been unable to rest and reports no relief with oxycodone. Orders for hydromorphone and zofran received.

## 2019-09-14 NOTE — PLAN OF CARE
Moved to [facility]. Oriented to room and unit. Oxycodone given @ 0120 with pain a 10/10. IVstarted and LR bolus infusing. Pt unable to remain still due to back pain, second oxycocode given @ 0130. Will reevaluate in 1 hour.

## 2019-09-14 NOTE — PLAN OF CARE
Pt has had better pain relief with IV medication and has been resting. Category 1 FHR tracing. No contractions, pt reports occasional cramps. Dr Payton updated @ 2381.

## 2019-09-14 NOTE — PROVIDER NOTIFICATION
09/14/19 0031   Provider Notification   Provider Name/Title Dr Payton   Method of Notification Phone   Notification Reason Pain;Status Update   Dr Payton notified that after pt was dressed and receiving discharge instructions she stated her pain was too severe and that she would like to stay for pain management. Orders received.

## 2019-09-14 NOTE — CONSULTS
Georgette Simons  [id number]  OB Admit History & Physical      HPI:  Ms. Simons  is a 34 year old  @ 36w5d by MP who presented to L&D for left flank pain.  Pt has a h/o multiple kidney stone.  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  She passed a stone in August.  Oxycodone was not helpful. Pt rated her pain at 10/10. The dilaudid IV was very helpful.       Prenatal course:  The pt was initially cared for by [facility] and now has transferred to [facility] and plans to deliver at [facility]. She is breech and declined a version.    Pregnancy complications:  Kidney stones, h/o herpes    Prenatal labs: Done elsewhere    OB history:   OB History    Para Term  AB Living   1 0 0 0 0 0   SAB TAB Ectopic Multiple Live Births   0 0 0 0 0      # Outcome Date GA Lbr Edward/2nd Weight Sex Delivery Anes PTL Lv   1 Current                  PMHx:     Past Medical History:   Diagnosis Date     Fractured tibia 2015      History of chlamydia      History of herpes genitalis      Kidney stones      Morbid obesity (H)      Obesity affecting pregnancy in second trimester 2019    BMI >35 Recommendations:  Body mass index is 41.32 kg/m . at first OB   **[employer] RECOMMENDS: Growth at 32 weeks, BPPs at 36 weeks and delivery between 39-40 weeks.  Follow-up is scheduled here in 3 week(s) to reassess anatomy that was suboptimally seen today.  Hbg A1C at 1st OB:  4.8  MFM Fetal Survey: WNL on anatomy seen follow up 3 weeks later for suboptimally seen anatomy  1 hr GCT at 28w:  **  Gr     Shingles        PSHX:    Past Surgical History:   Procedure Laterality Date     AS REDUCTION OF LARGE BREAST  2001     TONSILLECTOMY         Meds:    No current outpatient medications on file.       Allergies: Penicillins      REVIEW OF SYSTEMS:  Positives and negatives in HPI.     SocHx:    Social History     Socioeconomic History     Marital status: Single     Spouse name: Not on file     Number  "of children: Not on file     Years of education: Not on file     Highest education level: Not on file   Occupational History     Not on file   Social Needs     Financial resource strain: Not on file     Food insecurity:     Worry: Not on file     Inability: Not on file     Transportation needs:     Medical: Not on file     Non-medical: Not on file   Tobacco Use     Smoking status: Former Smoker     Types: Cigarettes     Smokeless tobacco: Never Used   Substance and Sexual Activity     Alcohol use: No     Alcohol/week: 0.0 oz     Frequency: Never     Comment: pregnant     Drug use: No     Sexual activity: Yes     Partners: Male   Lifestyle     Physical activity:     Days per week: Not on file     Minutes per session: Not on file     Stress: Not on file   Relationships     Social connections:     Talks on phone: Not on file     Gets together: Not on file     Attends Rastafari service: Not on file     Active member of club or organization: Not on file     Attends meetings of clubs or organizations: Not on file     Relationship status: Not on file     Intimate partner violence:     Fear of current or ex partner: Not on file     Emotionally abused: Not on file     Physically abused: Not on file     Forced sexual activity: Not on file   Other Topics Concern     Parent/sibling w/ CABG, MI or angioplasty before 65F 55M? Not Asked   Social History Narrative    ** Merged History Encounter **             Fam Hx:    Family History   Problem Relation Age of Onset     Family History Negative Mother      Thyroid Disease Mother      Family History Negative Father      Hypertension Maternal Grandmother      Diabetes Maternal Grandfather      Breast Cancer Maternal Aunt         had in 2000         PHYSICAL EXAM:      Vitals:  /69   Temp 98  F (36.7  C) (Oral)   Resp 18   Ht 1.778 m (5' 10\")   LMP 12/31/2018 (Exact Date)   SpO2 94%   BMI 42.56 kg/m    Alert Awake in NAD  ABD gravid, non-tender,  Back pain L>R  EFM:  " Baseline 130, with moderate variability, accels tpresent, no decels  [place]: contractions occasional  UA blood moderate, leukocyte esterase negative    Assessment:  IUP at 36w5d admitted for management of kidney stone pain. Pt declines seeing her nephrologist. Pt would like to be discharged home with pain meds.  She plans to f/u with her midwives on Monday..    Plan: Discharge home with Dilaudid   F/u with her CMW   Contact CMW if any problems over the weekend      Marilee Payton MD MD  Dept of OB/GYN  September 14, 2019

## 2019-09-15 ENCOUNTER — COMMUNICATION - HEALTHEAST (OUTPATIENT)
Dept: OBGYN | Facility: CLINIC | Age: 34
End: 2019-09-15

## 2019-09-15 DIAGNOSIS — N20.0 NEPHROLITHIASIS: ICD-10-CM

## 2019-09-15 DIAGNOSIS — O09.519 SUPERVISION OF HIGH-RISK PREGNANCY OF ELDERLY PRIMIGRAVIDA: ICD-10-CM

## 2019-09-16 ENCOUNTER — AMBULATORY - HEALTHEAST (OUTPATIENT)
Dept: OBGYN | Facility: CLINIC | Age: 34
End: 2019-09-16

## 2019-09-16 ENCOUNTER — COMMUNICATION - HEALTHEAST (OUTPATIENT)
Dept: MIDWIFE SERVICES | Facility: CLINIC | Age: 34
End: 2019-09-16

## 2019-09-16 ENCOUNTER — HOSPITAL ENCOUNTER (OUTPATIENT)
Dept: MEDSURG UNIT | Facility: CLINIC | Age: 34
Discharge: HOME | End: 2019-09-16
Attending: ADVANCED PRACTICE MIDWIFE | Admitting: ADVANCED PRACTICE MIDWIFE

## 2019-09-16 ENCOUNTER — AMBULATORY - HEALTHEAST (OUTPATIENT)
Dept: MIDWIFE SERVICES | Facility: CLINIC | Age: 34
End: 2019-09-16

## 2019-09-16 ENCOUNTER — COMMUNICATION - HEALTHEAST (OUTPATIENT)
Dept: OBGYN | Facility: CLINIC | Age: 34
End: 2019-09-16

## 2019-09-16 LAB
ALBUMIN UR-MCNC: NEGATIVE MG/DL
APPEARANCE UR: CLEAR
BACTERIA #/AREA URNS HPF: ABNORMAL HPF
BILIRUB UR QL STRIP: NEGATIVE
CLUE CELLS: NORMAL
COLOR UR AUTO: YELLOW
GLUCOSE UR STRIP-MCNC: NEGATIVE MG/DL
HGB UR QL STRIP: ABNORMAL
KETONES UR STRIP-MCNC: NEGATIVE MG/DL
LEUKOCYTE ESTERASE UR QL STRIP: NEGATIVE
MUCOUS THREADS #/AREA URNS LPF: ABNORMAL LPF
NITRATE UR QL: NEGATIVE
PH UR STRIP: 6 [PH] (ref 4.5–8)
RBC #/AREA URNS AUTO: ABNORMAL HPF
RUPTURE OF FETAL MEMBRANES BY ROM PLUS: NEGATIVE
SP GR UR STRIP: 1.01 (ref 1–1.03)
SQUAMOUS #/AREA URNS AUTO: >100 LPF
TRICHOMONAS, WET PREP: NORMAL
UROBILINOGEN UR STRIP-ACNC: ABNORMAL
WBC #/AREA URNS AUTO: ABNORMAL HPF
YEAST, WET PREP: NORMAL

## 2019-09-16 ASSESSMENT — MIFFLIN-ST. JEOR: SCORE: 2135.11

## 2019-09-17 ENCOUNTER — COMMUNICATION - HEALTHEAST (OUTPATIENT)
Dept: UROLOGY | Facility: CLINIC | Age: 34
End: 2019-09-17

## 2019-09-17 ENCOUNTER — RECORDS - HEALTHEAST (OUTPATIENT)
Dept: ADMINISTRATIVE | Facility: OTHER | Age: 34
End: 2019-09-17

## 2019-09-17 LAB
C TRACH DNA SPEC QL PROBE+SIG AMP: NEGATIVE
N GONORRHOEA DNA SPEC QL NAA+PROBE: NEGATIVE

## 2019-09-18 ENCOUNTER — PRENATAL OFFICE VISIT - HEALTHEAST (OUTPATIENT)
Dept: MIDWIFE SERVICES | Facility: CLINIC | Age: 34
End: 2019-09-18

## 2019-09-18 ENCOUNTER — COMMUNICATION - HEALTHEAST (OUTPATIENT)
Dept: MIDWIFE SERVICES | Facility: CLINIC | Age: 34
End: 2019-09-18

## 2019-09-18 DIAGNOSIS — E66.01 MORBID OBESITY (H): ICD-10-CM

## 2019-09-18 DIAGNOSIS — N13.30 HYDRONEPHROSIS OF LEFT KIDNEY: ICD-10-CM

## 2019-09-18 DIAGNOSIS — O09.519 SUPERVISION OF HIGH-RISK PREGNANCY OF ELDERLY PRIMIGRAVIDA: ICD-10-CM

## 2019-09-18 DIAGNOSIS — O99.013 ANEMIA DURING PREGNANCY IN THIRD TRIMESTER: ICD-10-CM

## 2019-09-18 DIAGNOSIS — O99.213 OBESITY AFFECTING PREGNANCY IN THIRD TRIMESTER: ICD-10-CM

## 2019-09-18 DIAGNOSIS — N20.0 NEPHROLITHIASIS: ICD-10-CM

## 2019-09-18 DIAGNOSIS — Z86.19 HISTORY OF HERPES GENITALIS: ICD-10-CM

## 2019-09-18 ASSESSMENT — MIFFLIN-ST. JEOR: SCORE: 2143.28

## 2019-09-19 ENCOUNTER — COMMUNICATION - HEALTHEAST (OUTPATIENT)
Dept: ADMINISTRATIVE | Facility: CLINIC | Age: 34
End: 2019-09-19

## 2019-09-19 ENCOUNTER — AMBULATORY - HEALTHEAST (OUTPATIENT)
Dept: LAB | Facility: CLINIC | Age: 34
End: 2019-09-19

## 2019-09-19 ENCOUNTER — AMBULATORY - HEALTHEAST (OUTPATIENT)
Dept: OBGYN | Facility: CLINIC | Age: 34
End: 2019-09-19

## 2019-09-19 DIAGNOSIS — N20.0 KIDNEY STONE: ICD-10-CM

## 2019-09-23 ENCOUNTER — PRENATAL OFFICE VISIT - HEALTHEAST (OUTPATIENT)
Dept: MIDWIFE SERVICES | Facility: CLINIC | Age: 34
End: 2019-09-23

## 2019-09-23 ENCOUNTER — OFFICE VISIT - HEALTHEAST (OUTPATIENT)
Dept: MATERNAL FETAL MEDICINE | Facility: HOSPITAL | Age: 34
End: 2019-09-23

## 2019-09-23 ENCOUNTER — RECORDS - HEALTHEAST (OUTPATIENT)
Dept: ADMINISTRATIVE | Facility: OTHER | Age: 34
End: 2019-09-23

## 2019-09-23 ENCOUNTER — RECORDS - HEALTHEAST (OUTPATIENT)
Dept: ULTRASOUND IMAGING | Facility: HOSPITAL | Age: 34
End: 2019-09-23

## 2019-09-23 DIAGNOSIS — O09.519 SUPERVISION OF HIGH-RISK PREGNANCY OF ELDERLY PRIMIGRAVIDA: ICD-10-CM

## 2019-09-23 DIAGNOSIS — O99.213 OBESITY AFFECTING PREGNANCY IN THIRD TRIMESTER: ICD-10-CM

## 2019-09-23 DIAGNOSIS — O99.213 OBESITY COMPLICATING PREGNANCY, THIRD TRIMESTER: ICD-10-CM

## 2019-09-23 LAB
APPEARANCE STONE: NORMAL
COMPN STONE: NORMAL
NUMBER STONE: 1
SIZE STONE: NORMAL MM
SPECIMEN WT: 67 MG

## 2019-09-23 ASSESSMENT — MIFFLIN-ST. JEOR: SCORE: 2142.82

## 2019-09-24 ENCOUNTER — AMBULATORY - HEALTHEAST (OUTPATIENT)
Dept: MIDWIFE SERVICES | Facility: CLINIC | Age: 34
End: 2019-09-24

## 2019-09-29 ENCOUNTER — ANESTHESIA - HEALTHEAST (OUTPATIENT)
Dept: OBGYN | Facility: CLINIC | Age: 34
End: 2019-09-29

## 2019-09-30 ENCOUNTER — AMBULATORY - HEALTHEAST (OUTPATIENT)
Dept: MIDWIFE SERVICES | Facility: CLINIC | Age: 34
End: 2019-09-30

## 2019-09-30 ENCOUNTER — SURGERY - HEALTHEAST (OUTPATIENT)
Dept: OBGYN | Facility: CLINIC | Age: 34
End: 2019-09-30

## 2019-09-30 ASSESSMENT — MIFFLIN-ST. JEOR: SCORE: 2116.97

## 2019-10-03 ENCOUNTER — COMMUNICATION - HEALTHEAST (OUTPATIENT)
Dept: OBGYN | Facility: CLINIC | Age: 34
End: 2019-10-03

## 2019-10-03 ENCOUNTER — HOME CARE/HOSPICE - HEALTHEAST (OUTPATIENT)
Dept: HOME HEALTH SERVICES | Facility: HOME HEALTH | Age: 34
End: 2019-10-03

## 2019-10-04 ENCOUNTER — HOME CARE/HOSPICE - HEALTHEAST (OUTPATIENT)
Dept: HOME HEALTH SERVICES | Facility: HOME HEALTH | Age: 34
End: 2019-10-04

## 2019-10-05 ENCOUNTER — AMBULATORY - HEALTHEAST (OUTPATIENT)
Dept: PEDIATRICS | Facility: CLINIC | Age: 34
End: 2019-10-05

## 2019-10-07 ENCOUNTER — COMMUNICATION - HEALTHEAST (OUTPATIENT)
Dept: MIDWIFE SERVICES | Facility: CLINIC | Age: 34
End: 2019-10-07

## 2019-10-08 ENCOUNTER — RECORDS - HEALTHEAST (OUTPATIENT)
Dept: ADMINISTRATIVE | Facility: OTHER | Age: 34
End: 2019-10-08

## 2019-10-15 ENCOUNTER — OFFICE VISIT - HEALTHEAST (OUTPATIENT)
Dept: MIDWIFE SERVICES | Facility: CLINIC | Age: 34
End: 2019-10-15

## 2019-10-15 DIAGNOSIS — S91.209A AVULSION OF TOENAIL, INITIAL ENCOUNTER: ICD-10-CM

## 2019-10-15 DIAGNOSIS — Z98.891 HISTORY OF PRIMARY CESAREAN SECTION: ICD-10-CM

## 2019-10-15 DIAGNOSIS — L84 CORN OF FOOT: ICD-10-CM

## 2019-10-15 ASSESSMENT — EDINBURGH POSTNATAL DEPRESSION SCALE (EPDS): TOTAL SCORE: 2

## 2019-10-22 ENCOUNTER — OFFICE VISIT - HEALTHEAST (OUTPATIENT)
Dept: UROLOGY | Facility: CLINIC | Age: 34
End: 2019-10-22

## 2019-10-22 ENCOUNTER — AMBULATORY - HEALTHEAST (OUTPATIENT)
Dept: UROLOGY | Facility: CLINIC | Age: 34
End: 2019-10-22

## 2019-10-22 ENCOUNTER — HOSPITAL ENCOUNTER (OUTPATIENT)
Dept: CT IMAGING | Facility: CLINIC | Age: 34
Discharge: HOME | End: 2019-10-22
Attending: PHYSICIAN ASSISTANT

## 2019-10-22 ENCOUNTER — AMBULATORY - HEALTHEAST (OUTPATIENT)
Dept: LAB | Facility: CLINIC | Age: 34
End: 2019-10-22

## 2019-10-22 DIAGNOSIS — N20.0 CALCULUS OF KIDNEY: ICD-10-CM

## 2019-10-22 LAB
ALBUMIN SERPL-MCNC: 3.9 G/DL (ref 3.5–5)
ALBUMIN UR-MCNC: NEGATIVE MG/DL
ANION GAP SERPL CALCULATED.3IONS-SCNC: 9 MMOL/L (ref 5–18)
APPEARANCE UR: CLEAR
BILIRUB UR QL STRIP: NEGATIVE
BUN SERPL-MCNC: 11 MG/DL (ref 8–22)
CALCIUM SERPL-MCNC: 9.7 MG/DL (ref 8.5–10.5)
CALCIUM SERPL-MCNC: 9.8 MG/DL (ref 8.5–10.5)
CALCIUM, IONIZED MEASURED: 1.21 MMOL/L (ref 1.11–1.3)
CHLORIDE BLD-SCNC: 106 MMOL/L (ref 98–107)
CO2 SERPL-SCNC: 24 MMOL/L (ref 22–31)
COLOR UR AUTO: YELLOW
CREAT SERPL-MCNC: 0.8 MG/DL (ref 0.6–1.1)
CREAT SERPL-MCNC: 0.82 MG/DL (ref 0.6–1.1)
GFR SERPL CREATININE-BSD FRML MDRD: >60 ML/MIN/1.73M2
GFR SERPL CREATININE-BSD FRML MDRD: >60 ML/MIN/1.73M2
GLUCOSE BLD-MCNC: 90 MG/DL (ref 70–125)
GLUCOSE UR STRIP-MCNC: NEGATIVE MG/DL
HGB UR QL STRIP: ABNORMAL
ION CA PH 7.4: 1.21 MMOL/L (ref 1.11–1.3)
KETONES UR STRIP-MCNC: NEGATIVE MG/DL
LEUKOCYTE ESTERASE UR QL STRIP: ABNORMAL
NITRATE UR QL: NEGATIVE
PH UR STRIP: 6.5 [PH] (ref 5–8)
PH: 7.41 (ref 7.35–7.45)
PHOSPHATE SERPL-MCNC: 3 MG/DL (ref 2.5–4.5)
PHOSPHATE SERPL-MCNC: 3.3 MG/DL (ref 2.5–4.5)
POTASSIUM BLD-SCNC: 3.9 MMOL/L (ref 3.5–5)
PTH-INTACT SERPL-MCNC: 53 PG/ML (ref 10–86)
SODIUM SERPL-SCNC: 139 MMOL/L (ref 136–145)
SP GR UR STRIP: 1.02 (ref 1–1.03)
URATE SERPL-MCNC: 7.1 MG/DL (ref 2–7.5)
UROBILINOGEN UR STRIP-ACNC: ABNORMAL

## 2019-10-23 ENCOUNTER — AMBULATORY - HEALTHEAST (OUTPATIENT)
Dept: MIDWIFE SERVICES | Facility: CLINIC | Age: 34
End: 2019-10-23

## 2019-10-24 ENCOUNTER — COMMUNICATION - HEALTHEAST (OUTPATIENT)
Dept: MIDWIFE SERVICES | Facility: CLINIC | Age: 34
End: 2019-10-24

## 2019-10-29 ENCOUNTER — OFFICE VISIT - HEALTHEAST (OUTPATIENT)
Dept: MIDWIFE SERVICES | Facility: CLINIC | Age: 34
End: 2019-10-29

## 2019-10-29 ENCOUNTER — AMBULATORY - HEALTHEAST (OUTPATIENT)
Dept: LAB | Facility: CLINIC | Age: 34
End: 2019-10-29

## 2019-10-29 DIAGNOSIS — Z01.818 PREOP GENERAL PHYSICAL EXAM: ICD-10-CM

## 2019-10-29 DIAGNOSIS — L98.9 SKIN ABNORMALITY: ICD-10-CM

## 2019-10-29 DIAGNOSIS — N20.0 CALCULUS OF KIDNEY: ICD-10-CM

## 2019-10-29 DIAGNOSIS — Z01.812 ENCOUNTER FOR PREPROCEDURAL LABORATORY EXAMINATION: ICD-10-CM

## 2019-10-29 LAB
HCG UR QL: NEGATIVE
HGB BLD-MCNC: 11.7 G/DL (ref 12–16)
MAGNESIUM 24H UR-MRATE: 68 MG/24 HR (ref 75–150)
MAGNESIUM UR-MCNC: 3.6 MG/DL
SPECIMEN VOL UR: 1900 ML

## 2019-10-29 ASSESSMENT — MIFFLIN-ST. JEOR: SCORE: 1985.43

## 2019-10-30 LAB
CALCIUM 24H UR-MRATE: 201 MG/24HR (ref 20–275)
CHLORIDE 24H UR-SRATE: 188 MMOL/24HR (ref 110–250)
CITRATE 24H UR-MCNC: 428 MG/24HR
CREATININE, 24 HR URINE - HISTORICAL: 1544.7 MG/24HR
OXALATE MG/SPEC: 43.5 MG/24HR (ref 7–44)
PH UR STRIP: 6 [PH] (ref 4.5–8)
PHOSPHORUS URINE MG/SPEC: 794.2 MG/24HR
POTASSIUM 24H UR-SCNC: 56 MMOL/24HR (ref 30–90)
SODIUM 24H UR-SRATE: 179 MMOL/24HR (ref 40–217)
SPECIMEN VOL UR: 1900 ML
URIC ACID URINE MG/SPEC: 534 MG/24HR (ref 250–750)

## 2019-11-07 ENCOUNTER — OFFICE VISIT - HEALTHEAST (OUTPATIENT)
Dept: MIDWIFE SERVICES | Facility: CLINIC | Age: 34
End: 2019-11-07

## 2019-11-07 ENCOUNTER — RECORDS - HEALTHEAST (OUTPATIENT)
Dept: ADMINISTRATIVE | Facility: OTHER | Age: 34
End: 2019-11-07

## 2019-11-07 DIAGNOSIS — Z30.430 ENCOUNTER FOR IUD INSERTION: ICD-10-CM

## 2019-11-07 DIAGNOSIS — Z01.812 PRE-PROCEDURE LAB EXAM: ICD-10-CM

## 2019-11-07 LAB — HCG UR QL: NEGATIVE

## 2019-11-07 ASSESSMENT — MIFFLIN-ST. JEOR: SCORE: 2010.83

## 2019-11-07 ASSESSMENT — EDINBURGH POSTNATAL DEPRESSION SCALE (EPDS): TOTAL SCORE: 0

## 2019-11-13 ENCOUNTER — OFFICE VISIT - HEALTHEAST (OUTPATIENT)
Dept: UROLOGY | Facility: CLINIC | Age: 34
End: 2019-11-13

## 2019-11-13 DIAGNOSIS — R34 LOW URINE OUTPUT: ICD-10-CM

## 2019-11-13 DIAGNOSIS — R82.992 HYPEROXALURIA: ICD-10-CM

## 2019-11-13 DIAGNOSIS — N20.0 CALCULUS OF KIDNEY: ICD-10-CM

## 2019-11-13 LAB
ALBUMIN UR-MCNC: NEGATIVE MG/DL
APPEARANCE UR: CLEAR
BILIRUB UR QL STRIP: NEGATIVE
COLOR UR AUTO: YELLOW
GLUCOSE UR STRIP-MCNC: NEGATIVE MG/DL
HGB UR QL STRIP: ABNORMAL
KETONES UR STRIP-MCNC: NEGATIVE MG/DL
LEUKOCYTE ESTERASE UR QL STRIP: ABNORMAL
NITRATE UR QL: NEGATIVE
PH UR STRIP: 7 [PH] (ref 5–8)
SP GR UR STRIP: 1.02 (ref 1–1.03)
UROBILINOGEN UR STRIP-ACNC: ABNORMAL

## 2019-11-18 ASSESSMENT — MIFFLIN-ST. JEOR: SCORE: 1985.43

## 2019-11-21 ENCOUNTER — SURGERY - HEALTHEAST (OUTPATIENT)
Dept: SURGERY | Facility: CLINIC | Age: 34
End: 2019-11-21

## 2019-11-21 ENCOUNTER — ANESTHESIA - HEALTHEAST (OUTPATIENT)
Dept: SURGERY | Facility: CLINIC | Age: 34
End: 2019-11-21

## 2019-12-02 ENCOUNTER — OFFICE VISIT - HEALTHEAST (OUTPATIENT)
Dept: MIDWIFE SERVICES | Facility: CLINIC | Age: 34
End: 2019-12-02

## 2019-12-02 DIAGNOSIS — Z30.431 IUD CHECK UP: ICD-10-CM

## 2019-12-02 ASSESSMENT — MIFFLIN-ST. JEOR: SCORE: 1985.43

## 2020-03-10 ENCOUNTER — HEALTH MAINTENANCE LETTER (OUTPATIENT)
Age: 35
End: 2020-03-10

## 2020-06-24 ENCOUNTER — COMMUNICATION - HEALTHEAST (OUTPATIENT)
Dept: MIDWIFE SERVICES | Facility: CLINIC | Age: 35
End: 2020-06-24

## 2020-06-26 ENCOUNTER — OFFICE VISIT - HEALTHEAST (OUTPATIENT)
Dept: MIDWIFE SERVICES | Facility: CLINIC | Age: 35
End: 2020-06-26

## 2020-06-26 ENCOUNTER — COMMUNICATION - HEALTHEAST (OUTPATIENT)
Dept: MIDWIFE SERVICES | Facility: CLINIC | Age: 35
End: 2020-06-26

## 2020-06-26 DIAGNOSIS — R53.83 OTHER FATIGUE: ICD-10-CM

## 2020-06-26 DIAGNOSIS — R50.9 ELEVATED TEMPERATURE: ICD-10-CM

## 2020-06-26 DIAGNOSIS — R42 VERTIGO: ICD-10-CM

## 2020-06-26 DIAGNOSIS — R42 CHRONIC VERTIGO: ICD-10-CM

## 2020-06-26 DIAGNOSIS — R68.83 CHILLS (WITHOUT FEVER): ICD-10-CM

## 2020-06-26 LAB
ANION GAP SERPL CALCULATED.3IONS-SCNC: 11 MMOL/L (ref 5–18)
BASOPHILS # BLD AUTO: 0.1 THOU/UL (ref 0–0.2)
BASOPHILS NFR BLD AUTO: 1 % (ref 0–2)
BUN SERPL-MCNC: 13 MG/DL (ref 8–22)
CALCIUM SERPL-MCNC: 10.1 MG/DL (ref 8.5–10.5)
CHLORIDE BLD-SCNC: 106 MMOL/L (ref 98–107)
CO2 SERPL-SCNC: 21 MMOL/L (ref 22–31)
CREAT SERPL-MCNC: 0.76 MG/DL (ref 0.6–1.1)
EOSINOPHIL # BLD AUTO: 0.2 THOU/UL (ref 0–0.4)
EOSINOPHIL NFR BLD AUTO: 3 % (ref 0–6)
ERYTHROCYTE [DISTWIDTH] IN BLOOD BY AUTOMATED COUNT: 13 % (ref 11–14.5)
GFR SERPL CREATININE-BSD FRML MDRD: >60 ML/MIN/1.73M2
GLUCOSE BLD-MCNC: 81 MG/DL (ref 70–125)
HCT VFR BLD AUTO: 40.2 % (ref 35–47)
HGB BLD-MCNC: 13.1 G/DL (ref 12–16)
LYMPHOCYTES # BLD AUTO: 2.3 THOU/UL (ref 0.8–4.4)
LYMPHOCYTES NFR BLD AUTO: 38 % (ref 20–40)
MCH RBC QN AUTO: 29 PG (ref 27–34)
MCHC RBC AUTO-ENTMCNC: 32.6 G/DL (ref 32–36)
MCV RBC AUTO: 89 FL (ref 80–100)
MONOCYTES # BLD AUTO: 0.5 THOU/UL (ref 0–0.9)
MONOCYTES NFR BLD AUTO: 8 % (ref 2–10)
NEUTROPHILS # BLD AUTO: 3.1 THOU/UL (ref 2–7.7)
NEUTROPHILS NFR BLD AUTO: 50 % (ref 50–70)
PLATELET # BLD AUTO: 304 THOU/UL (ref 140–440)
PMV BLD AUTO: 11.3 FL (ref 8.5–12.5)
POTASSIUM BLD-SCNC: 4.4 MMOL/L (ref 3.5–5)
RBC # BLD AUTO: 4.51 MILL/UL (ref 3.8–5.4)
SODIUM SERPL-SCNC: 138 MMOL/L (ref 136–145)
TSH SERPL DL<=0.005 MIU/L-ACNC: 0.77 UIU/ML (ref 0.3–5)
WBC: 6.2 THOU/UL (ref 4–11)

## 2020-06-26 ASSESSMENT — MIFFLIN-ST. JEOR: SCORE: 2080.68

## 2020-07-15 ENCOUNTER — COMMUNICATION - HEALTHEAST (OUTPATIENT)
Dept: MIDWIFE SERVICES | Facility: CLINIC | Age: 35
End: 2020-07-15

## 2020-07-15 ENCOUNTER — COMMUNICATION - HEALTHEAST (OUTPATIENT)
Dept: ADMINISTRATIVE | Facility: CLINIC | Age: 35
End: 2020-07-15

## 2020-07-15 ENCOUNTER — AMBULATORY - HEALTHEAST (OUTPATIENT)
Dept: LAB | Facility: CLINIC | Age: 35
End: 2020-07-15

## 2020-07-15 DIAGNOSIS — Z83.2 FAMILY HISTORY OF PERNICIOUS ANEMIA: ICD-10-CM

## 2020-07-16 ENCOUNTER — COMMUNICATION - HEALTHEAST (OUTPATIENT)
Dept: MIDWIFE SERVICES | Facility: CLINIC | Age: 35
End: 2020-07-16

## 2020-07-16 ENCOUNTER — AMBULATORY - HEALTHEAST (OUTPATIENT)
Dept: OTHER | Facility: CLINIC | Age: 35
End: 2020-07-16

## 2020-07-16 DIAGNOSIS — R42 CHRONIC VERTIGO: ICD-10-CM

## 2020-07-16 LAB — VIT B12 SERPL-MCNC: 300 PG/ML (ref 213–816)

## 2020-09-21 ENCOUNTER — OFFICE VISIT (OUTPATIENT)
Dept: URGENT CARE | Facility: URGENT CARE | Age: 35
End: 2020-09-21
Payer: COMMERCIAL

## 2020-09-21 ENCOUNTER — ANCILLARY PROCEDURE (OUTPATIENT)
Dept: GENERAL RADIOLOGY | Facility: CLINIC | Age: 35
End: 2020-09-21
Attending: PHYSICIAN ASSISTANT
Payer: COMMERCIAL

## 2020-09-21 VITALS
HEIGHT: 70 IN | BODY MASS INDEX: 41.95 KG/M2 | DIASTOLIC BLOOD PRESSURE: 72 MMHG | TEMPERATURE: 98.3 F | RESPIRATION RATE: 14 BRPM | WEIGHT: 293 LBS | SYSTOLIC BLOOD PRESSURE: 118 MMHG | HEART RATE: 80 BPM

## 2020-09-21 DIAGNOSIS — M25.532 LEFT WRIST PAIN: ICD-10-CM

## 2020-09-21 DIAGNOSIS — M25.532 LEFT WRIST PAIN: Primary | ICD-10-CM

## 2020-09-21 PROCEDURE — 99213 OFFICE O/P EST LOW 20 MIN: CPT | Performed by: PHYSICIAN ASSISTANT

## 2020-09-21 PROCEDURE — 73110 X-RAY EXAM OF WRIST: CPT | Mod: LT

## 2020-09-21 ASSESSMENT — MIFFLIN-ST. JEOR: SCORE: 2117.43

## 2020-09-21 NOTE — PATIENT INSTRUCTIONS
Wear splint most of the day (michelle at work)  Try to stretch your wrist daily  OK to take IBU for pain  If wrist pain still present in 2 weeks, without improvement, follow-up with ortho / sports medicine.

## 2020-09-21 NOTE — PROGRESS NOTES
"SUBJECTIVE:  Chief Complaint   Patient presents with     Wrist Pain     Lt wrist pain for 2-3 weeks. Pt recall no know injuries, but lifts a lot in her filed as a      Georgette Simons is a 35 year old right handed female who presents with a chief complaint of left wrist pain.  Symptoms began 2 week(s) ago, are moderate and worsening  Context:  Injury:No.  Pain is make worse with flexion and extension. Relieved by neutral position. She has been taking IBU for her pain.  This is the first time this type of pain has occurred to this patient.   She denies having numbness or tingling into fingers.   Past Medical History:   Diagnosis Date     Fractured tibia 6/2015      History of chlamydia      History of herpes genitalis      Kidney stones      Morbid obesity (H)      Obesity affecting pregnancy in second trimester 2/28/2019    BMI >35 Recommendations:  Body mass index is 41.32 kg/m . at first OB   **MFM RECOMMENDS: Growth at 32 weeks, BPPs at 36 weeks and delivery between 39-40 weeks.  Follow-up is scheduled here in 3 week(s) to reassess anatomy that was suboptimally seen today.  Hbg A1C at 1st OB:  4.8  MFM Fetal Survey: WNL on anatomy seen follow up 3 weeks later for suboptimally seen anatomy  1 hr GCT at 28w:  **  Gr     Shingles      Current Outpatient Medications   Medication Sig Dispense Refill     lidocaine (LIDODERM) 5 % patch Place 2 patches onto the skin every 24 hours 60 patch 3     Social History     Tobacco Use     Smoking status: Former Smoker     Types: Cigarettes     Smokeless tobacco: Never Used   Substance Use Topics     Alcohol use: No     Alcohol/week: 0.0 standard drinks     Frequency: Never     Comment: pregnant       ROS:  Review of systems negative except as stated above.    EXAM:   /72   Pulse 80   Temp 98.3  F (36.8  C) (Oral)   Resp 14   Ht 1.778 m (5' 10\")   Wt 134.7 kg (297 lb)   BMI 42.62 kg/m    M/S Exam:Left wrist has TTP on lateral aspect of wrist. No swelling or bruising " noted. Decreased ROM with flexion and extension. Supination / pronation intact.   GENERAL APPEARANCE: healthy, alert and no distress  EXTREMITIES: peripheral pulses normal  SKIN: no suspicious lesions or rashes  NEURO: Normal strength and tone, sensory exam grossly normal, mentation intact and speech normal    X-RAY -- No evidence of fracture or abnormality    ASSESSMENT / PLAN:  1. Left wrist pain  Suspect tendonitis.   Encouraged using wrist brace, ice and using IBU for pain  Stretch wrist three times per day   Follow-up with sportmed / ortho in two weeks if symptoms fail to improve.  - XR Wrist Left G/E 3 Views; Future    Katy Gates PA-C

## 2020-11-13 ENCOUNTER — COMMUNICATION - HEALTHEAST (OUTPATIENT)
Dept: MIDWIFE SERVICES | Facility: CLINIC | Age: 35
End: 2020-11-13

## 2020-11-30 ENCOUNTER — OFFICE VISIT - HEALTHEAST (OUTPATIENT)
Dept: MIDWIFE SERVICES | Facility: CLINIC | Age: 35
End: 2020-11-30

## 2020-11-30 DIAGNOSIS — Z00.00 ANNUAL PHYSICAL EXAM: ICD-10-CM

## 2020-11-30 DIAGNOSIS — Z97.5 IUD (INTRAUTERINE DEVICE) IN PLACE: ICD-10-CM

## 2020-11-30 ASSESSMENT — MIFFLIN-ST. JEOR: SCORE: 1989.96

## 2020-12-04 ENCOUNTER — AMBULATORY - HEALTHEAST (OUTPATIENT)
Dept: LAB | Facility: CLINIC | Age: 35
End: 2020-12-04

## 2020-12-04 ENCOUNTER — HOSPITAL ENCOUNTER (OUTPATIENT)
Dept: ULTRASOUND IMAGING | Facility: CLINIC | Age: 35
Discharge: HOME | End: 2020-12-04
Attending: MIDWIFE

## 2020-12-04 DIAGNOSIS — Z97.5 IUD (INTRAUTERINE DEVICE) IN PLACE: ICD-10-CM

## 2020-12-04 LAB
CHOLEST SERPL-MCNC: 165 MG/DL
FASTING STATUS PATIENT QL REPORTED: YES
HDLC SERPL-MCNC: 26 MG/DL
LDLC SERPL CALC-MCNC: 115 MG/DL
TRIGL SERPL-MCNC: 120 MG/DL

## 2020-12-24 ENCOUNTER — AMBULATORY - HEALTHEAST (OUTPATIENT)
Dept: UROLOGY | Facility: CLINIC | Age: 35
End: 2020-12-24

## 2020-12-24 DIAGNOSIS — N20.0 CALCULUS OF KIDNEY: ICD-10-CM

## 2020-12-27 ENCOUNTER — HEALTH MAINTENANCE LETTER (OUTPATIENT)
Age: 35
End: 2020-12-27

## 2020-12-28 ENCOUNTER — OFFICE VISIT - HEALTHEAST (OUTPATIENT)
Dept: UROLOGY | Facility: CLINIC | Age: 35
End: 2020-12-28

## 2020-12-28 DIAGNOSIS — N20.1 CALCULUS OF URETER: ICD-10-CM

## 2020-12-29 ENCOUNTER — COMMUNICATION - HEALTHEAST (OUTPATIENT)
Dept: UROLOGY | Facility: CLINIC | Age: 35
End: 2020-12-29

## 2021-01-11 ENCOUNTER — HOSPITAL ENCOUNTER (OUTPATIENT)
Dept: CT IMAGING | Facility: CLINIC | Age: 36
Discharge: HOME | End: 2021-01-11
Attending: UROLOGY

## 2021-01-11 DIAGNOSIS — N20.1 CALCULUS OF URETER: ICD-10-CM

## 2021-01-12 ENCOUNTER — OFFICE VISIT - HEALTHEAST (OUTPATIENT)
Dept: UROLOGY | Facility: CLINIC | Age: 36
End: 2021-01-12

## 2021-01-12 DIAGNOSIS — N13.2 HYDRONEPHROSIS WITH URINARY OBSTRUCTION DUE TO URETERAL CALCULUS: ICD-10-CM

## 2021-01-12 DIAGNOSIS — N20.1 CALCULUS OF URETER: ICD-10-CM

## 2021-01-13 ENCOUNTER — AMBULATORY - HEALTHEAST (OUTPATIENT)
Dept: SURGERY | Facility: AMBULATORY SURGERY CENTER | Age: 36
End: 2021-01-13

## 2021-01-13 DIAGNOSIS — Z11.59 ENCOUNTER FOR SCREENING FOR OTHER VIRAL DISEASES: ICD-10-CM

## 2021-01-18 ENCOUNTER — OFFICE VISIT - HEALTHEAST (OUTPATIENT)
Dept: MIDWIFE SERVICES | Facility: CLINIC | Age: 36
End: 2021-01-18

## 2021-01-18 DIAGNOSIS — N20.0 CALCULUS OF KIDNEY: ICD-10-CM

## 2021-01-18 DIAGNOSIS — Z97.5 IUD (INTRAUTERINE DEVICE) IN PLACE: ICD-10-CM

## 2021-01-18 DIAGNOSIS — Z01.818 PRE-OPERATIVE GENERAL PHYSICAL EXAMINATION: ICD-10-CM

## 2021-01-18 LAB
HCG UR QL: NEGATIVE
HGB BLD-MCNC: 14.2 G/DL (ref 12–16)

## 2021-01-18 ASSESSMENT — MIFFLIN-ST. JEOR: SCORE: 1908.31

## 2021-01-19 ENCOUNTER — AMBULATORY - HEALTHEAST (OUTPATIENT)
Dept: FAMILY MEDICINE | Facility: CLINIC | Age: 36
End: 2021-01-19

## 2021-01-19 DIAGNOSIS — Z11.59 ENCOUNTER FOR SCREENING FOR OTHER VIRAL DISEASES: ICD-10-CM

## 2021-01-20 ENCOUNTER — COMMUNICATION - HEALTHEAST (OUTPATIENT)
Dept: SCHEDULING | Facility: CLINIC | Age: 36
End: 2021-01-20

## 2021-01-20 LAB
SARS-COV-2 PCR COMMENT: NORMAL
SARS-COV-2 RNA SPEC QL NAA+PROBE: NEGATIVE
SARS-COV-2 VIRUS SPECIMEN SOURCE: NORMAL

## 2021-01-22 ENCOUNTER — SURGERY - HEALTHEAST (OUTPATIENT)
Dept: SURGERY | Facility: AMBULATORY SURGERY CENTER | Age: 36
End: 2021-01-22

## 2021-01-22 ENCOUNTER — ANESTHESIA - HEALTHEAST (OUTPATIENT)
Dept: SURGERY | Facility: AMBULATORY SURGERY CENTER | Age: 36
End: 2021-01-22

## 2021-01-22 ASSESSMENT — MIFFLIN-ST. JEOR: SCORE: 1908.31

## 2021-01-26 ENCOUNTER — RECORDS - HEALTHEAST (OUTPATIENT)
Dept: ADMINISTRATIVE | Facility: OTHER | Age: 36
End: 2021-01-26

## 2021-01-28 ENCOUNTER — AMBULATORY - HEALTHEAST (OUTPATIENT)
Dept: UROLOGY | Facility: CLINIC | Age: 36
End: 2021-01-28

## 2021-01-28 ENCOUNTER — COMMUNICATION - HEALTHEAST (OUTPATIENT)
Dept: UROLOGY | Facility: CLINIC | Age: 36
End: 2021-01-28

## 2021-01-28 DIAGNOSIS — N20.1 CALCULUS OF URETER: ICD-10-CM

## 2021-04-24 ENCOUNTER — HEALTH MAINTENANCE LETTER (OUTPATIENT)
Age: 36
End: 2021-04-24

## 2021-05-26 VITALS
TEMPERATURE: 97.7 F | OXYGEN SATURATION: 98 % | HEART RATE: 97 BPM | DIASTOLIC BLOOD PRESSURE: 78 MMHG | SYSTOLIC BLOOD PRESSURE: 132 MMHG

## 2021-05-26 VITALS — TEMPERATURE: 98.1 F | DIASTOLIC BLOOD PRESSURE: 69 MMHG | HEART RATE: 76 BPM | SYSTOLIC BLOOD PRESSURE: 112 MMHG

## 2021-05-26 VITALS — DIASTOLIC BLOOD PRESSURE: 72 MMHG | SYSTOLIC BLOOD PRESSURE: 126 MMHG | TEMPERATURE: 97.7 F | HEART RATE: 86 BPM

## 2021-05-28 ENCOUNTER — RECORDS - HEALTHEAST (OUTPATIENT)
Dept: ADMINISTRATIVE | Facility: CLINIC | Age: 36
End: 2021-05-28

## 2021-05-30 NOTE — PROGRESS NOTES
"Please see \"Imaging\" tab under \"Chart Review\" for details of today's visit.    Joshua King        "

## 2021-05-31 NOTE — PLAN OF CARE
Patient is going home due to while here she passed her kidney stone. Going to send for analysis.  Patient is good with plan and heading home

## 2021-05-31 NOTE — PLAN OF CARE
Patient here for abdominal pain.  She is coping well now.  On the way here she felt a lot of pressure and then the pain was gone.  Has hystery of kidney stones.  Going to do a ua and baby is category 1 tracing without contractions.  Will make further plan with residents when results of ua are back

## 2021-05-31 NOTE — PROGRESS NOTES
"Please see \"Imaging\" tab under Chart Review for full report.  This ultrasound was performed in the [facility], and may be located under Care Everywhere.    Drea Garcia MD  Maternal Fetal Medicine    " Internal Medicine

## 2021-05-31 NOTE — TELEPHONE ENCOUNTER
Previous clinic: [facility] appointment date & location: 9/3 WBY  Weeks gestation at appointment: 35+  How will records be sent to the clinic?: faxed to [facility]  Phone number where you may be reached: [phone]

## 2021-05-31 NOTE — PROGRESS NOTES
"PRENATAL VISIT   TRANSFER OF CARE VISIT - OB    Assessment / Impression     , Transfer of Care Visit at 35w1d   Morbid Obesity; BMI at IOB 41.32  Hx Genital HSV; suppressive therapy ordered.  Hx breast reduction  AMA  Hx Nephrolithiasis in pregnancy  PCN allergy    Plan:        1. Prenatal labs reviewed from [facility] records. Will enter into External Lab results. Patient is ineligible for WB.  2. Oriented to CNM care and philosophy, group, on-call and contact info discussed. Reviewed prenatal care schedule. IOB packet given and reviewed.  3. Hxs reviewed. Problem list reviewed and updated.  4. Medications: Continue PNVs. Discussed HSV suppressive therapy to being at 36 weeks- reviewed rationale for this. Acyclovir ordered 400 mg PO three times a day through [facility].   5. Genetic screening options: Pt had Innatal and AFP done prior to [facility], normal.  6. Role of ultrasound in pregnancy discussed: pt has weekly BPPs scheduled with [facility] starting on 19.  7. Follow up: 1-2 weeks for GANGA and GBS/Hgb. Pt states she will schedule this for after her BPP on 19. Aware if baby is still breech we will send for ECV consult which she is open to.  8. Complete 32 week checklist at upcoming visit.    Total time spent with patient: 35 minutes, >50% counseling, education and coordination of care.    Subjective:    Georgette Sanchez is a 34 y.o., here for transfer of care visit to establish care with [facility] CNMs. Here by herself today. Reason for Transfer: Moved from [place] to [place] to live with her parents. Desires birth at [facility].  is Abhi.    States she had a growth US on 19 with [facility] at [facility] office and baby was \"larger\". Unsure on EFW. Baby was also breech. Unable to see this US report in Epic or CareMyzeywhere. Will have CA look into getting. She has her weekly BPPs already scheduled as well at [facility].    Aware we will verify presentation with next scheduled US and make a plan if baby still breech.    Education: " "college grad  Occupation: RN    Transfer From: [facility]  Date of First Visit: 19 at 8w3d  Number of Visits prior to transfer: 9.    Risk factors thus far:  pre-pregnancy obesity, Hx genital HSV     OB History    Para Term  AB Living   1             SAB TAB Ectopic Multiple Live Births           0      # Outcome Date GA Lbr Edward/2nd Weight Sex Delivery Anes PTL Lv   1 Current                Past Medical History:   Diagnosis Date     Genital HSV      History of varicella as a child      Kidney stone      No past surgical history on file.  Social History     Tobacco Use     Smoking status: Never Smoker     Smokeless tobacco: Never Used   Substance Use Topics     Alcohol use: Not Currently     Drug use: Never     Current Outpatient Medications   Medication Sig Dispense Refill     acetaminophen (TYLENOL ORAL) Take by mouth.       calcium carbonate (TUMS ORAL) Take by mouth.       ergocalciferol, vitamin D2, (VITAMIN D2 ORAL) Take by mouth.       PNV no.95/ferrous fum/folic ac (PRENATAL ORAL) Take by mouth.       ranitidine HCl (ZANTAC ORAL) Take by mouth.       acyclovir (ZOVIRAX) 400 MG tablet Take 1 tablet (400 mg total) by mouth 3 (three) times a day. 60 tablet 2     No current facility-administered medications for this visit.      Allergies   Allergen Reactions     Penicillins Rash     rash         Review of Systems  General:  Denies problem  Eyes: Denies problem  Ears/Nose/Throat: Denies problem  Cardiovascular: Denies problem  Respiratory:  Denies problem  Gastrointestinal:  denies problems  Genitourinary: denies problems  Musculoskeletal:  Denies problem  Skin: Denies problem  Neurologic:denies problems  Psychiatric: denies problems  Endocrine: denies problems    Objective:   Objective    Vitals:    19 1654   BP: 110/66   Pulse: 82   Resp: 18   Weight: (!) 301 lb (136.5 kg)   Height: 5' 10\" (1.778 m)     Physical Exam  General Appearance: Alert, cooperative, no distress, appears stated " age  Abdomen: Soft, non-tender, no masses, no organomegaly. Fundal height: 37cm.  FHTs present. See flowsheet.  Extremities: Extremities normal without varicosities or edema      CALLI Wood,JOBYM

## 2021-05-31 NOTE — PATIENT INSTRUCTIONS - HE
[facility] Nurse Midwives - Contact information:  Appointment line and to get a hold of CNM in clinic Monday-Friday 8 am - 5 pm:  [phone].  There are some clinics with early start times (1st appointment 7:40 am) and others with evening hours (last appointment 6:20 pm).  Most are typically open from 8 am to 5 pm.    CNM on call answering service: [phone].  Specify your hospital of choice and leave a brief message for CNM;  will then page CNM who is on call at your specified hospital and you should receive a call back with 15 minutes.  Be sure that your ringer is audible and that you can accept blocked calls so that we can get back in touch with you! This number should be reserved for urgent needs if during the day, before 8 am, after 5 pm, weekends, holidays.    Contact the on-call CNM with warning signs, such as:    vaginal bleeding     Vaginal discharge and itching or pain and burning during urination    Leg/calf pain or swelling on one side    severe abdominal pain    nausea and vomiting more than 4-5 times a day, or if you are unable to keep anything down    fever more than 100.4 degrees F.          You are invited to  Meet the [facility] Nurse-Midwives  A way to tour the hospital Labor and Delivery unit and meet the midwives that attend births since you may not have the opportunity to meet them during your prenatal care.  Some sessions are informal meet and greet type social hours, others address a specific concern or topic.    Tuesday, Februrary 12, 2019 7-8pm  [facility]    Wednesday, April 17, 2019 7-8pm  [facility]    Thursday, August 15, 2019 7-8pm  [facility]    Wednesday November 13, 2019 7-8 pm  [facility]    Please call [phone] to register        Touring the Maternity Care Center  To schedule a tour at either [facility] or [facility], please do so online using  the following links:  [facility] - [email or web address]  [facility] - [email or web address]      Car Seat Clinics:  https://dps.[place].gov/divisions/ots/child-passenger-safety/Pages/car-seat-checks.aspx  [facility]    Free Car Seat Distribution Facilities     By Appt. Address Contact Information (For appointment)      \Yes [employer]\[address]\[place],\[place] Leeanne Morrison)-\[email or web address]      Yes [facility]\[place],\[place] Guillermina Bang)[email or web address]      Yes [facility]\[address] [facility]\[place],\[place] Carmen Spears)[phone]\[email or web address]      Immunizations:  http://www.cdc.gov/vaccines/schedules/easy-to-read/child.html      Postpartum Depression  The first weeks of caring for a  baby are more than a full-time job. Although it is often a happy time, your feelings and moods may not be what you expected. Many women experience  baby blues.  Here are some tips to help you understand when feelings of sadness are normal, and when you should call your health care provider.    What are the baby blues?  As many as 3 of every 4 women will have short periods of mood swings, crying, or feeling cranky or restless during the first weeks after birth. These feelings can be worse when you are tired or anxious. Women who have the baby blues often say they feel like crying but don t know why. Baby blues usually happen in the first or second week postpartum (after you give birth) and last less than a week. If you are not sleeping, becoming more upset, don t feel like you can take care of your baby, or your sadness lasts 2 weeks or more, call your health care provider.    What is postpartum  depression?  About one in every 5 women will develop depression during the first few months postpartum that may be mild, moderate, or severe. Women who have postpartum depression may have some of these symptoms:    Feeling guilty     Not able to enjoy your baby and feeling like you are not bonding with your baby      Not able to sleep, even when the baby is sleeping    Sleeping too much and feeling too tired to get out of bed    Feeling overwhelmed and not able to do what you need to during the day    Not able to concentrate    Don t feel like eating    Feeling like you are not normal or not yourself anymore    Not able to make decisions    Feeling like a failure as a mother    Feeling lonely or all alone    Thinking your baby might be better off without you  If you have any of these symptoms, call your health care provider!    Which symptoms of postpartum depression are dangerous?  Sometimes a woman with postpartum depression will have thoughts of harming herself or her baby. If you find yourself thinking about hurting yourself or your baby, call your health care provider immediately.    MOTHERHOOD: THE EARLY DAYS  You prepare for the birth of your baby for many months during pregnancy, and then the first months at home after your baby is born can be a quiet, gentle time of getting to know this new person who has come to live in your home. But for most women it is not all quiet or sweet. And for some women it is a very hard time.  What Can I Expect in the First Few Months After the Baby Comes?  New mothers and their families face many challenges in the first few months:    Your body and your hormones have to get back to normal.    You and the baby will be learning to breastfeed.    Babies only sleep a few hours at a time. The entire family will have a hard time getting enough sleep.    You and your family need to learn how to parent this new family member.    If you have a partner, you have to figure out how to  stay together as a couple and maybe even start to have sex again.    You may have to figure out how to keep from getting pregnant again right away.    You may need to return to work and find day care.    How Long Will it Take for My Body to Get Back to Normal?  Some changes will occur quickly. Others will not occur as quickly.    Your uterus, cervix, and vagina will all shrink to their nonpregnant size in about 2 weeks. Your vagina may be tender and dry for a few months--especially if you are breastfeeding.    If you had stitches or hemorrhoids, your   bottom   will be sore for 2 weeks or more.    For some women who have problems urinating, it can take several months for you to be able to hold your urine when you cough or sneeze or suddenly  something heavy.    Your breast milk will   come in   2 to 3 days after the birth of your baby. It will take 6 to 8 weeks for you and the baby to get the hang of breastfeeding and find a pattern. During these first weeks, you can have engorged breasts at times and often leak milk.    Your stomach and intestines all have to fall back into place. You may have a lot of gas for a few weeks.    You may be constipated--especially if you are breastfeeding.    Your stretched stomach muscles can recover in a few weeks, but for some women it takes longer--6 months or a year--to recover.    If you had a  delivery, you may have pain or numbness around the incision for 6 months or more.    Losing the weight you gained during pregnancy will probably take 6 months to a year. Have patience! It took 40 weeks to get here. Give yourself 40 weeks to get back.    What Can I Expect When My Hormones Change?  About 75% of all women will get the   blues.   This usually starts about 3 days after the birth of your baby. You may cry easily and feel very, very tired. A few women become very depressed. If you had a  delivery or your new baby was sick, you are at a higher risk for  depression.  Call your health care provider right away if you cannot care for yourself or your baby, if you feel very nervous or worried, if you cannot stop crying, or if you are having thoughts of hurting yourself or your baby.    Taking Care of Yourself  While you are still pregnant:    Talk with your partner and your family about the time ahead. Arrange for someone to help you during the first weeks at home if you can.    Talk with your health care provider about birth control options and make a plan before the baby comes.    If you are worried about how to parent a , take parenting classes. You will learn a lot about how babies act and you will make some friends who are going through the same thing at the same time. Most [facility] have these classes.    Arrange for someone to help with baby care if you can.  After the baby comes:    Ask for help. Let other people do the cooking and cleaning and run the house. Focus on yourself and your baby.    Sleep whenever you can. Try not to be tempted to   get some things done   when the baby sleeps. This is your time to sleep, too.    Drink lots of water. You will need at least 6 big glasses of water everyday to avoid constipation and make enough breast milk. Every time you sit down to breastfeed, have a big glass of water with you to drink while you are nursing.    Eat lots of vegetables and fruit. You will need lots of vitamins and fiber to help your body get back to normal. This will also help you avoid constipation.    Go outside and walk. Babies can go outside even if it is very cold. Fresh air and sunshine will do you both good.    Take sitz baths. Put about 6 inches of warm water in your bathtub and sit in there for 15 minutes 2 to 3 times a day. This will help your   bottom   heal more quickly. It will also give you 15 minutes of private time!    Talk to other mothers. Join a new parents group. Call Donna and go to [facility] meetings if you are  breastfeeding.     With your partner:    Keep talking. Share the experience.    Spend time alone. Even a 30-minute walk can be a date.    Start a birth control method. You can get pregnant before you even have a period. It is very important to use birth control if you do not want to get pregnant again right away.    When you have sex, use a lubricant. A lot of lubricant! Take it slow.  The first few months after a baby comes can be a lot like floating in a jar of honey--very sweet and romero, but very sticky, too. Take time to enjoy the good parts. Remind yourself that this time will pass. Bon voyage!    FOR MORE INFORMATION  For questions about depression during and after pregnancy:  http://www.womenshealth.gov/publications/our-publications/fact-sheet/depression-pregnancy.html   After birth: The first 6 weeks:  http://www.Instabeat/Post-Birth-and-Recovery   Breastfeeding resources:  http://www.[employer].org/health-info/getting-breastfeeding-off-to-a-good-start/    HEALTHY PREGNANCY CARE: 37 to 41 WEEKS PREGNANT    Talk with your midwife or physician about when to call with signs of labor    Regular uterine contractions that are getting closer together and/or stronger    If you think your water has broken or is leaking    Bleeding from the vagina like a period (bloody vaginal discharge is normal)    If you are not feeling your baby move    Make plans for transportation and  as needed for when you are going to the hospital.    Your midwife or physician may offer to check your cervix for changes.     Ask your health care provider about vaccinations you may need following delivery. By now, you should have received a Tdap immunization to protect against pertussis or whooping cough. Fathers and family members who will be in close contact with the baby should also receive a Tdap shot at least two weeks before the expected birth of the baby if they have not had a Td (tetanus) shot for at least  two years.    If you are past your due date, discuss the next steps leading to delivery with your midwife or physician. If you don't start labor on your own by 41 or 42 weeks, your midwife or physician may recommend giving you medicines to ripen your cervix and start labor.    Preparing for your baby: Tell your midwife or physician how you plan to feed your baby (breast or bottle), who you have chosen to do pediatric care for your baby, and if you have a boy, whether you have chosen to have him circumcised. You will need a car seat correctly installed in your vehicle to bring your baby home. As you start to set up the nursery at home for your baby, make sure the crib is safe. The mattress needs to fit snugly against the edges of the crib. If you can fit a soda can between the bars, they are too far apart and can allow the baby's head to caught between them.    Learn about infant care and feeding, including information about infant CPR. We recommend that you put your baby to sleep on his or her back to reduce the chance of Sudden Infant Death Syndrome (SIDS). To maintain a healthy environment in which your child can grow, it's best to keep your home smoke-free. By preparing ahead, your transition into parenthood will go smoothly for you and your baby.    Your midwife or physician will want to see you for a checkup 2 to 6 weeks after delivery.    If you have questions about any symptoms you are experiencing or any other concerns, call your provider or their clinic staff at [facility] at Phone: [phone]. If it's after clinic hours, physician patients should call the [facility] at [phone]); midwife patients should call their answering service at [phone].    How can you care for yourself at home?   You can refer to the Starting Out Right book or find it online at http://www.[facility].org/images/stories/maternity/[facility]-Starting-Out-Right.pdf or  http://www.[facility].org/images/stories/flipbooks/[facility]-starting-out-right/[facility]-starting-out-right.html#p=8     You can sign up for a weekly parenting e-mail that gives support, tips and advice from health care professionals that starts with pregnancy and continues through the toddler years. To register, go to [email or web address] at any time during your pregnancy.        Making Plans for Feeding My Baby    By this point, you probably have read a lot about feeding your baby.  Breastfeed or formula? Each mother s decision is her own and [facility] respects you and your choices. We ve gathered information on both breastfeeding and formula feeding to help with your decision. Talking with your physician or nurse-midwife can also help in your decision.  However you plan to feed your baby, [facility] encourage rooming in with your baby, skin-to-skin contact and feeding your baby based on his or her cues.    Skin-to-skin contact  Being close to mom helps your baby adjust to life outside of the womb.  It helps your baby regulate their body temperature, heart rate, and breathing.  Your baby will usually be placed skin-to-skin immediately following birth or as soon as possible, if medical intervention is needed.    Rooming-In  Having your baby stay with you in your room is called  rooming-in .  Keeping your baby in your room helps you to learn how to care for your baby by getting to know your baby s cues, body rhythms and sleep cycle.       Cue-based feeding  Cues (signals) are baby s way of telling you what he or she wants.  When you learn your infant s cues, you know how to care for and feed your baby.   Feeding cues are the licking and smacking of lips, bringing their fist to their mouth, and a reflex called  rooting - where baby turns and opens his or her mouth, searching for the breast or bottle.  Crying is a late feeding cue.  Babies can feed frequently, often at least 8 times in 24  hours.  Breastfeeding facts  Breast milk is the best source of nutrition for your baby and is available at birth. In the first couple of days, your milk volume is already starting to increase, though it may not be noticeable. Breastfeed frequently to increase your milk supply. Within three to five days, you will begin to notice larger milk volumes. An increase in breast size, heaviness and firmness are often described as the milk  coming in.  Frequent breastfeeding can help breasts from getting overly firm and painful. You will know the baby is getting enough milk if your baby is having wet and dirty diapers and gaining weight.     If your goal is to exclusively breastfeed, it is important to not use any formula or artificial nipples (including bottles and pacifiers) while your baby is learning to breastfeed.  While it may seem like an  easy  option to give your baby a bottle, formula should only be given if there is a medical reason for your baby to have it.    Positioning and attachment   Get comfortable.  Use pillows as needed to support your arms and baby.  Hold baby close at the level of your breast, facing you in a tummy to tummy position.  Skin to skin helps with this.  Position the baby with his or her nose by the nipple.  There should be a straight line from baby s ear to shoulder to hips.  Tickle your baby s lips or wait for baby to open mouth wide, bring baby to breast by leading with the chin.  Aim the nipple at the roof of baby s mouth.  A rapid sucking pattern is followed by longer, drawing pattern with occasional swallows heard.  When baby is correctly latched, your nipple and much of the areola are pulled well into baby s mouth.      Returning to work or school  Focus on a good start to breastfeeding.  Many women continue to provide breastmilk for their baby when they return to work or school.  Making plans about where to pump and store milk can make the transition go well.  Talk with other mothers  who have also returned to work or school for tips and support.  Your employer s Human Resource department may be a resource as well.     Returning to work or school: (continued)     babies can mean fewer  sick  days for you.    A quality breast pump will also save time and add comfort.  Check with your insurance prior to giving birth for breast pump coverage.  Many insurance companies include a pump within your benefits.    Wait until your baby is at least three weeks old to introduce a bottle for the first time.  Have someone besides you give the bottle.    Breastfeed when you are with your baby. Reserve your bottles of breast milk for when you are away.     Your breasts will need to be  emptied  either by your baby or a pump.  Plan to pump at least twice in an eight hour day.    If you cannot pump at work, continue breastfeeding at home. Any amount of breast milk is worth giving to your baby.    Formula feeding facts  If you are planning to use formula to feed your baby, you will want to make some preparations ahead of time. Talk to your doctor or nurse-midwife about what type of formula to use. Some are iron-fortified, meaning they have extra iron in them. You will want to purchase formula and bottles before your baby is born to be sure you are ready after you return from the hospital. The [facility] do not provide formula samples to take home.    Be sure to follow formula mixing directions closely. Regular milk in the dairy case at the grocery store should not be given to babies under 1 year old. Baby formula is sold in several forms including:    Ready-to-use. This is the most expensive, but no mixing is necessary.    Concentrated liquid. This is less expensive than ready-to-use and you mix with water.    Powder. This is the least expensive. You mix one level scoop of powdered formula with two ounces of water and stir well.    Most babies need 2.5 ounces of formula per pound of body weight  each day. This means an 8-pound baby may drink about 20 ounces of formula a day; however, this is just an estimate. The most important thing is to pay attention to your baby s cues.  If your baby is always fussy, needs more iron or has certain food allergies, your physician may suggest you change your baby s formula to a different kind.     How do I warm my baby s bottles?  You may feed your baby a bottle without warming it first. It is OK for the breast milk or formula to be cool or room temperature. If your baby seems to prefer it warmed, you can put the filled bottle in a container of warm water and let it stand for a few minutes. Check the temperature of the liquid on your skin before feeding it to your baby; to be sure it isn t too hot. Do not heat bottles in the microwave. Microwaves heat food and liquids unevenly, and this can cause hot spots that can burn your baby.    How do I clean and sterilize bottles?  Sterilize bottles and nipples before you use them for the first time. You can do this by putting them in boiling water for 5 minutes. After that first time, you can wash them in hot and soapy water. Rinse them carefully to be sure there is no soap left on them. You can also wash them in the .    [facility] [phone] ([phone])    Share with Women\Radha I in Labor?  What is labor? Labor is the work that your body does to birth your baby. Your uterus (the womb) contracts(tightens). The contractions(labor pains) push your baby down onto your cervix(the opening ofyour uterus). Thispressure causesyour cervix to open. When your cervix iscompletely open (10 centimeters dilated), you will push your baby through your vagina and out into the world.  What do contractions feel like? When contractionsfirst start, theyusually feellike cramps duringyour period. Sometimesyoufeelpain in your back. Mostoften,contractions feel like muscles pulling painfully in your lower belly. At first, the contractions will  probably be 15 to 20 minutes apart.They maybe irregular and will not feel too painful. As labor goes on, the contractionsget stronger,closer together, more consistent, and more painful.  How do I time the contractions? When the contractionsseem to be coming regularly, youshould start to time them.You time your contractions by counting the number of minutes from the start of one contraction to the start of the next contraction.  What should I do during early labor when the contractions start? If it is night andyoucan sleep, do so. If it happensduring the day, there are some things you can do to take care of yourself at home: Walk. If the painsyou are having are reallabor, walking will makethecontractionscome closer together and they will be stronger,but you will be able to cope with them better if you are standing or moving around. If the contractions are early labor ones that come andgo (sometimes called false labor), walkingcan make them go away. Take a shower or bath. This will help you relax. Eat. Labor is a big event.Your body needs a lot of energy to be effective.Eat whatever you feel like eating. Drink water. Not drinking enough water can cause contractions to not be as effectiveas theyshould be.You need to be well hydrated (drinking enoughwater) to help your body work well during labor. Take a na p. If youfeel tired, lay down on your side and get all the rest you can. It helps to be rested when you go intoactive labor. Do something you enjoy. Spend time with family. Watch a movie. Distraction will help you relax. Get a massage. If your labor is in your back, a strong massage on your lower back may feel very good. Getting a foot massage or having a partner rub your feet can also be very relaxing. Don t panic. You can do this. Your body was made for this. You are strong!  When should I call my health care provider if I think I am in labor? Your contractions have been 5 minutes or less apart for at least an  hour. Your contractions are becoming so painful youcannot walk or talk during one. You think your amniotic sac (bag of rice) breaks. You may have a big gush of amniotic fluid (water) or just fluid that runs down your legs when you walk or move or change position.  Are there other reasons to call my health care provider? If you are concerned about anything, don t hesitate to call your health care provider.You should definitely call your health care provider or go to the hospital if:  It is 3 weeks or more before your due date, and you are having contractions.  You have vaginal bleeding that is more than your period, soaks your underwear, or runs down your legs.  You have sudden severe pain that does not go away with rest.  Your baby has not movedfor several hours.  You are leaking greenish fluid.    For More Information: http://onlinelibrary.hernandez.com/doi/10.1111/jmwh.70098/epdf     US Department of Health and Human Services: Signs oflabor,labor stages, and types of birth  http://womenshealth.gov/pregnancy/childbirth-beyond/labor-birth.html#a      Childbirth and Parenting Education:   [place] parenting center: http://SplashCast/   (435) 748-BABY  [place]: (education, yoga & wellness) www.DeliveryChef.in  Enlightened Mama: www.enlightenedLayered Technologies.Roomorama   Childbirth collective: (Parent topic nights)  www.childbirthcollective.org/  Hypnobabies:  www.hypnobabiestwincities.com/  Hypnobirthing:  Http://hypnobirthing.com/    Book Recommendations:   Mallory Corwith's Birthing From Within--first few chapters include a new-age tone, you may prefer to skip it and keep going, because there is good stuff later.  This book recommendation covers emotional preparation, but does cover coping with pain, and use of both pharmacological and nonpharmacological methods.    Dr. Ross' The Pregnancy Book and The Birth Book--the pregnancy book goes month-by month    Womanly Art of Breastfeeding by La Leche League International   Bestfeeding by  "Mei Rodrigues--great pictures    Mothering Your Nursing Toddler, by Nilda Pina.   Addresses dealing with so many of the challenging behaviors of a nursing toddler.  How Weaning Happens, by La Leche League.  Discusses weaning at all ages, from medically necessary weaning of an infant, all the way up to age 5 (or older), with why/why not, and strategies.  Very empowering book both for deciding to wean and deciding not to.    American College of Nurse-Midwives (ACNM) http://www.midwife.org/; look at the informational handouts at http://www.midwife.org/Share-With-Women     www.mymidwife.org    Mother to Baby (Medication and Herbal guidance in pregnancy): http://www.mothertobaby.org  Toll-Free Hotline: 314.568.1565  LactMed (Medication use while breastfeeding): http://toxnet.nlm.nih.gov/newtoxnet/lactmed.htm    Women's Health.gov:  http://www.womenshealth.gov/a-z-topics/index.html    American pregnancy association - http://americanpregnancy.org    Centering Pregnancy (group prenatal care option): http://centeringhealthcare.org    Information about doulas:  Childbirth collective: http://www.childbirthcollective.org/  Doulas of [place] (FERNANDA):  www.fernanda.org  [facility]  project: http://twincitiesdoulaproject.com/     Early Childhood and Family Education (ECFE):  ECFE offers parents hands-on learning experiences that will nourish a lifetime of teachable moments.  http://ecfe.info/ecfe-home/    March of Dimes www.marchSignal DatadiEntone Technologies.com     FDA - Nutrition  www.mypyramid.gov  Under \"For Consumers,\" click on \"pregnant and breastfeeding women.\"      Centers for Disease Control and Prevention (CDC) - Vaccines : http://www.cdc.gov/vaccines/       When researching information on the web, question the validity of websites.  The domains .gov, .edu and.org tend to be more reliable information.  If there are a lot of advertisements, be cautious of the information provided. Stay away from blogs and chat rooms please!   "

## 2021-06-01 NOTE — TELEPHONE ENCOUNTER
Phone Call: CNM called patient who is currently being discharged from  ER. She was evaluated overnight in the [facility] for flank pain and obstetric concerns were ruled out. She was then seen in the ER and imaging showed mild left hydronephrosis and obstructing uretral stone could not be ruled out. Per patient, the ER physician spoke with Dr Lewis at [facility] and a stent is not needed at this time. Pt is discharging to home with pain medications. She is eager to go home to rest now. I encouraged that she follow up in the CNM clinic in 2 -3 days which she agrees to. Also encouraged ECV consult be completed - a referral was sent over night to [facility] due to breech presentation. We reviewed her normal BPP that was done today as well. Pt agrees with plan. Staff message sent to midwifery support pool to fax ECV referral asap.

## 2021-06-01 NOTE — PROGRESS NOTES
"Pt presents to unit with c/o \"I have kidney stones.\" Pt reports that she was seen on Friday at [facility] and was given oral pain medication to take at home. She reports \"I get kidney stones all the time,\" and that this feels the same. Upon arrival, pt rates her pain 10/10 on pain scale. Denies painful contractions, vaginal bleeding. FM+. Pt has been taking oral dilaudid at home, last at 0030, and states that it is not helping her pain any longer. CNM at bedside to evaluate pt. Labs obtained and sent. SVE done by CNM, see documentation. Morphine IM given, pt now reports pain 7/10. EFM displays a Reactive NST. Plan to discharge pt and transfer to ED for further evaluation.   "

## 2021-06-01 NOTE — TELEPHONE ENCOUNTER
TC to on call midwife: Georgette, 33 yo  at 36/6 weeks, called to report she is experiencing pain from her kidney stones. She developed pain in her midback to left side last Wednesday. The pain became intense 9/10 on Friday and she went to [facility] to be evaluated. I am unable to see these records in Care Everywhere.   She shared that she was admitted overnight and had prolonged fetal monitoring. She was discharged Saturday with Dilaudid 2mg to take every 4-6 hours. She did not have any imaging of her kidney stones, but did have occult blood in her urine.   Georgette did not need the Dilaudid yesterday, but today her pain has returned (the same midback wrapping to the left side). She took 2mg of Dilaudid at 3:30pm and she feels like it did not touch her pain. Her pain is reaching where it was on Friday, but is still not as bad as it was.     Georgette states that she has struggled with kidney stones for years and that they come and go. She did develop one that was too large to pass and she received lithotripsy. She is concerned that this may be too large to pass and would like to get imaging. She denies fever, chills, s/sx of PTL    Georgette has an US with [facility] tomorrow for a BPP (weekly now for prepregnancy BMI >40). I am not sure how to order an US with [facility], but asked if she could inquire if a renal ultrasound could be done as well and explain her situation. If they are unable to do an ultrasound, she is seeing Esthela Verma CNM, tomorrow at [facility], and she could order an US. Georgette may also need a refill on her pain medications.     Georgette does have a referral to a nephrologist, but has not been able to get in as she has been so busy with her OB appointments.     Advised Georgette to take 2-4mg of Dilaudid every 4-6 hours for her pain. Discussed that sometimes pain can trigger/mask labor and reviewed signs of PTL to watch for. Encouraged her to follow up with Esthela Verma CNM, in the morning.     Deyanira  Juan, CALLI,CNM

## 2021-06-01 NOTE — TELEPHONE ENCOUNTER
Spoke with patient who would like to eventually come here to \[facility]\"" for her care.  She is feeling well at the moment and is going to see her OB tomorrow.  She will call [facility] with intractable pain and or concerning symptoms if needed.  She will plan on seeing OB and will call \[facility]\"" after she has her baby for f/u.  Clinic number given to patient.  Leila Vega RN

## 2021-06-01 NOTE — ANESTHESIA PREPROCEDURE EVALUATION
Anesthesia Evaluation      Patient summary reviewed   No history of anesthetic complications     Airway   Mallampati: II  Neck ROM: full   Pulmonary - negative ROS and normal exam    breath sounds clear to auscultation                         Cardiovascular - negative ROS and normal exam  Exercise tolerance: > or = 4 METS  Rhythm: regular  Rate: normal,         Neuro/Psych - negative ROS     Endo/Other - negative ROS   (+) obesity, pregnant     GI/Hepatic/Renal - negative ROS   (+)   chronic renal disease,      Other findings: BMI =40-45. . H/o nephrolithiasis.      Dental - normal exam                        Anesthesia Plan  Planned anesthetic: spinal and ITN  Bilateral TAP blocks for POA as indicated as requested by surgeon.  ASA 3     Anesthetic plan and risks discussed with: patient  Anesthesia plan special considerations: antiemetics,   Post-op plan: routine recovery

## 2021-06-01 NOTE — PROGRESS NOTES
Georgette is here today for her routine prenatal visit at 38 weeks. She is scheduled for a primary  next Monday due to breech fetus. BPP done this morning confirms baby is still breech. Continues to take Acyclovir daily for HSV prevention, discussed stopping it this week, but in case baby would turn spontaneously may continue until  night. Passed kidney stone on Thursday. Has been off work snce last week, will watch for [facility] paperwork via fax.

## 2021-06-01 NOTE — PROGRESS NOTES
Pt given DC instructions. She verbalized understanding and had no further questions or concerns. She had her belongings in her possession and was transferred to ED via wheelchair upon dc from unit accompanied by her .

## 2021-06-01 NOTE — TELEPHONE ENCOUNTER
TC to on call midwife:   Georgette, 35 yo  at 37 weeks reports she took 4mg of Dilaudid at 8:30pm and 12:30am. She was able to sleep for half of an hour, but awoke with 10/10 left flank pain at 1am. (see previous phone note). Advised that she come to [facility] for evaluation and management of her pain.     CALLI Coy,JOBYM

## 2021-06-01 NOTE — PROGRESS NOTES
Georgette is here today for routine prenatal visit at 36 weeks. She was seen at [facility] earlier today for BPP and was told she did not need an NST if BPP (8/8, normal amt of AF) was OK so none done today. Baby was breech on ultrasound so we discussed consult with MD for ECV. Numbers for OBGYN given and advised to be seen this week. No cervical exam done today. GBS and Hgb obtained.

## 2021-06-01 NOTE — PROGRESS NOTES
Georgette is here with Abhi today.  She is feeling much better than she did when she was in the emergency room over the weekend.  She came in with flank pain and was diagnosed with mild hydronephrosis with possible kidney stones.  She has a history of kidney stones in an outside of pregnancy.  Testing was inconclusive to say whether the stones were obstructive or not.  She has not yet been able to straighten out any stones.  She was prescribed Dilaudid and also oxycodone and use the each 1 time since she was prescribed them.  Heat is helpful.  She also has questions about proper dosage for acyclovir given her history of genital herpes.  Clarified that she should take 400 mg 3 times a day.  She did have a consult with [facility] yesterday for primary  section versus external version.  At this point, she and her  would like to opt for  section.  Explained that the CNM on-call would like to be with her during her surgery if she so chooses.  She is hoping to schedule that surgery today.  Had BPP 2 days ago which was normal.  Will have another BPP and office visit with CNM next week.  Please ask about surgery date at her next visit.  She has looked into spinning babies website but has not yet tried any of the techniques.  Discussed moxibustion and gave resources for this.  Also reassured that they would check fetal position prior to initiating surgery.  She is asking about using Flomax to help with her stones.  Discussed that this is not first-line treatment for pregnancy.  Also asking about dosage and type of iron she should take given her anemia.  This was clarified.  She is feeling active fetal movement and no signs or symptoms of labor.

## 2021-06-01 NOTE — ANESTHESIA PROCEDURE NOTES
Peripheral Block    Patient location during procedure: OR  Start time: 2019 8:47 AM  End time: 2019 8:51 AM  post-op analgesia per surgeon order as noted in medical record  Staffing:  Performing  Anesthesiologist: Bernabe Ojeda MD  Preanesthetic Checklist  Completed: patient identified, site marked, risks, benefits, and alternatives discussed, timeout performed, consent obtained, airway assessed, oxygen available, suction available, emergency drugs available and hand hygiene performed  Peripheral Block  Block type: other, TAP  Prep: ChloraPrep  Patient position: supine  Patient monitoring: cardiac monitor, continuous pulse oximetry, heart rate and blood pressure  Laterality: bilateral, same technique used bilaterally  Injection technique: ultrasound guided    Ultrasound used to visualize needle placement in proximity to nerve being blocked: yes   Permanent ultrasound image captured for medical record      Needle  Needle type: Stimuplex   Needle gauge: 21 G  Needle length: 6 in  no peripheral nerve catheter placed  Assessment  Injection assessment: no difficulty with injection, negative aspiration for heme, no paresthesia on injection and incremental injection  Additional Notes  In OR after completion of  with SAB. No complications. Patient tolerated well.

## 2021-06-01 NOTE — TELEPHONE ENCOUNTER
Name of caller: Patient  Phone number where you may be reached: [phone]  Reason for call: pt just passed a kidney stone. Pt wants to know if she should bring it to a lab or anything?  Best time to call back: asap  If we don't reach you, is it okay to leave a detailed message? yes

## 2021-06-01 NOTE — TELEPHONE ENCOUNTER
Pt reports relief following stone passage.  Has referral to see [facility] after delivery.  Will bring stone in for lab analysis.  Order entered.

## 2021-06-01 NOTE — ANESTHESIA POSTPROCEDURE EVALUATION
Patient: Georgette Sanchez  PRIMARY  SECTION FOR BREECH PRESENTATION  Anesthesia type: spinal    Patient location: Labor and Delivery  Last vitals:   Vitals Value Taken Time   /67 2019  4:45 PM   Temp 36.8  C (98.2  F) 2019  4:45 PM   Pulse 85 2019  4:45 PM   Resp 16 2019  4:45 PM   SpO2 96 % 2019  4:45 PM     Post vital signs: stable  Level of consciousness: awake and return to baseline  Post-anesthesia pain: pain controlled  Post-anesthesia nausea and vomiting: yes, treated  Pulmonary: unassisted, return to baseline  Cardiovascular: stable and blood pressure at baseline  Hydration: adequate  Anesthetic events: no    QCDR Measures:  ASA# 11 - Maritza-op Cardiac Arrest: ASA11B - Patient did NOT experience unanticipated cardiac arrest  ASA# 12 - Maritza-op Mortality Rate: ASA12B - Patient did NOT die  ASA# 13 - PACU Re-Intubation Rate: NA - No ETT / LMA used for case  ASA# 10 - Composite Anes Safety: ASA10A - No serious adverse event    Additional Notes: SAB resolved

## 2021-06-01 NOTE — ANESTHESIA CARE TRANSFER NOTE
Last vitals:   Vitals:    09/30/19 0903   BP: 115/63   Pulse: 95   Resp: 14   Temp: 36.4  C (97.6  F)   SpO2: 95%     Patient's level of consciousness is awake  Spontaneous respirations: yes  Maintains airway independently: yes  Dentition unchanged: yes  Oropharynx: oropharynx clear of all foreign objects    QCDR Measures:  ASA# 20 - Surgical Safety Checklist: WHO surgical safety checklist completed prior to induction    PQRS# 430 - Adult PONV Prevention: 4558F - Pt received => 2 anti-emetic agents (different classes) preop & intraop  ASA# 8 - Peds PONV Prevention: NA - Not pediatric patient, not GA or 2 or more risk factors NOT present  PQRS# 424 - Maritza-op Temp Management: 4559F - At least one body temp DOCUMENTED => 35.5C or 95.9F within required timeframe  PQRS# 426 - PACU Transfer Protocol: - Transfer of care checklist used  ASA# 14 - Acute Post-op Pain: ASA14B - Patient did NOT experience pain >= 7 out of 10

## 2021-06-01 NOTE — H&P
Outpatient/Triage Note:    Patient Name:  Georgette Sanchez  :      1985  MRN:      [id number]    Subjective  Georgette Sanchez is a 34 y.o.  who presented to [facility] for evaluation of left flank pain. Georgette has a long history of kidney stones. She first started to get kidney stones when she was 21 years old. She has two aunts who also get kidney stones. She has not needed lithotripsy in the past, but has needed a stent placement. Georgette has seen Dr. Enriquez, Nephrologist with [place] in the past. She was suppose to go back for a yearly f/u visit last 2019, but she did not go due to multiple pregnancy related appointments     Georgette first developed left flank pain last Wednesday. The pain increased and she went to [facility] on Friday. 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 She was kept overnight and her pain was managed with Dilaudid. She had a UA that showed occult blood. She did not have any imaging of her kidneys/ureters while in patient. She was discharged on Saturday with a prescription for Dilaudid 2mg every 6 hours.   Georgette called this writer at 9pm  reporting that she had taken 2mg of Dilaudid at 3:30pm and felt like it did not touch her pain. She was advised to take 4mg every 4-6 hours. She took 4mg at 8:30pm and again at 12:30am. She was able to sleep for half an hour from 12:30-1am, but then woke in pain 10/10 and called this writer back at 2:30am and we agreed to meet at  [facility] & [facility] to evaluate her left flank pain.     Georgette reports her pain is in the middle of her back, localized to the left side. She has noticed some uterine cramping, but nothing regular. Denies HA, visual changes, RUQ pain. She has noticed some more watery discharge at 2:30am, but nothing since then. Denies vaginal itching, irritation, leakage of fluid. Reports good fetal movement.   Denies any dysuria or notable blood in her urine.  Georgette had an OB appointment last Monday and had her cervix assessed when she  was in-patient at [facility]. Her cervix was closed at that time per her report.   Georgette's baby was breech on ultrasound last week. She was offered a consult appointment with OB/GYN to learn more about ECV. Currently she is leaning toward a scheduled  as she is concerned that the ECV could cause fetal distress or cord entanglement.       ROS:   - Contractions: absent   - LOF: present, small amount, leaving a lime size spot on her underwear   - Bleeding: absent   - HA: absent   - Dysuria/hematuria: absent   - Swelling of the hands/face/feet: absent   - Fetal movement: present    Objective       Physical Exam:  General appearance:   Psych: AAO x3  Skin: Pink, warm & dry  HEENT: unremarkable  Cardiovascular:  RRR, S1, S2, no extra sounds or murmurs  Respiratory:  breath sounds CTA bilaterally, anteriorly and posteriorl  Abdomen: soft, non-tender, gravid   EFW by leopolds: 7lbs    BP: 142/67, Heart rate: 86 Temp: 98.2          Fetal Heart Rate:    Rate:Baseline Classification:   135  Variability: Variability: Moderate Variability 6-25bpm  Pattern:   accelerations present, no decelerations       Uterine Activity: (per RN documentation)  Mode: Monitor Mode: External  Contraction frequency:   Occasional  Contraction duration:   30 seconds  Contraction quality:   mild        Cervical Exam: Dilation   0 cm  Effacement   50 %  Cervical characteristics   moderate  Station   -3  Cervical characteristics )  posterior  Mullins: 2       Extremeties: 1+ edema        Recent Results (from the past 24 hour(s))   Rupture of Membrane Test or Screen   Result Value Ref Range    Rupture Fetal Membrane Negative Negative   Collect and send wet prep vaginal specimen as indicated by prenatal history and/or patient complaints   Result Value Ref Range    Yeast Result No yeast seen No yeast seen    Trichomonas No Trichomonas seen No Trichomonas seen    Clue Cells, Wet Prep No Clue cells seen No Clue cells seen   Urinalysis-UC if Indicated    Result Value Ref Range    Color, UA Yellow Colorless, Yellow, Straw, Light Yellow    Clarity, UA Clear Clear    Glucose, UA Negative Negative    Bilirubin, UA Negative Negative    Ketones, UA Negative Negative    Specific Gravity, UA 1.010 1.001 - 1.030    Blood, UA Large (!) Negative    pH, UA 6.0 4.5 - 8.0    Protein, UA Negative Negative mg/dL    Urobilinogen, UA <2.0 E.U./dL <2.0 E.U./dL, 2.0 E.U./dL    Nitrite, UA Negative Negative    Leukocytes, UA Negative Negative    Bacteria, UA Few (!) None Seen hpf    RBC, UA  (!) None Seen, 0-2 hpf    WBC, UA 0-5 None Seen, 0-5 hpf    Squam Epithel, UA >100 (!) None Seen, 0-5 lpf    Mucus, UA Few (!) None Seen lpf         Assessment:   37w0d, left flank pain 10/10  History of Nephrolithiasis since   BMI 43  Breech presentation, desires ECV consult  H/o HSV 2 + result, 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 15mg Morphine IM given and patient is now reporting her pain at 7/10. Reviewed negative ROM Plus, Wet prep with her. UA shows Large amount of blood, suggestive of a kidney stone. Georgette is stable obstetrically and advised that she be transferred to the ER to be worked up for her kidney stone.   -Plan for OB care: Will cancel Georgette's routine OB appt today and appt with M for her weekly BPP. Will order BPP to be done when she gets Renal US while in ER.   -Discussed that ECV is effective approximately 60% of the time and that the risk of fetal distress is low. Discussed the value in getting a consultation appointment with an OB/GYN to learn about risks/benefits and to discuss the risks/benefits of a planned . Georgette would like to have a ECV consult appointment, but may choose a planned .   - Discharge to ER for further workup of her left flank pain.   -Letter written supporting Georgette being off of work for the next few days to recover. Letter stated that she could return to work Wednesday  Sept 18 pending resolution of her pain. She may need more time off as her condition is not resolving.   -Encouraged Georgette to schedule a routine OB appt with the midwives for later this week when she her pain is under better control  -Reviewed s/sx of labor and when to call.   - All questions answered. Agrees with plan.     Total time spent with patient: 1 hour, >50% time spent counseling and coordination of care.    Provider:  CALLI Coy CNM      Date:  9/16/2019  Time:  4:01 AM

## 2021-06-01 NOTE — PATIENT INSTRUCTIONS - HE
[facility] Nurse Midwives - Contact information:  Appointment line and to get a hold of CNM in clinic Monday-Friday 8 am - 5 pm:  [phone].  There are some clinics with early start times (1st appointment 7:40 am) and others with evening hours (last appointment 6:20 pm).  Most are typically open from 8 am to 5 pm.    CNM on call answering service: [phone].  Specify your hospital of choice and leave a brief message for CNM;  will then page CNM who is on call at your specified hospital and you should receive a call back with 15 minutes.  Be sure that your ringer is audible and that you can accept blocked calls so that we can get back in touch with you! This number should be reserved for urgent needs if during the day, before 8 am, after 5 pm, weekends, holidays.    Contact the on-call CNM with warning signs, such as:    vaginal bleeding     Vaginal discharge and itching or pain and burning during urination    Leg/calf pain or swelling on one side    severe abdominal pain    nausea and vomiting more than 4-5 times a day, or if you are unable to keep anything down    fever more than 100.4 degrees F.         You are invited to  Meet the [facility] Nurse-Midwives  A way to tour the hospital Labor and Delivery unit and meet the midwives that attend births since you may not have the opportunity to meet them during your prenatal care.  Some sessions are informal meet and greet type social hours, others address a specific concern or topic.    Thursday, Februrary 22, 2018 7-8pm  [facility]  Social Hour    Thursday, April 19, 2018 7-8pm  [facility]  Exercise, nutrition and weight gain    Tuesday, June 28, 2018 7-8pm  [facility]  Postpartum depression/anxiety    Thursday August 23, 2018 7-8 pm  [facility]  Physiology of birth and breastfeeding, Pediatrician presentation    Thursday October 18,  2018  7-8pm,  [facility]  Social Hour    Please call [phone] to register        Touring the Maternity Care Center  To schedule a tour at either [place] or [facility] [place] [facility], please do so online using the following links:  [facility] [place] [facility] - [email or web address]&[email or web address]&[email or web address]&[email or web address]  [facility] - [email or web address]://[email or web address]&[email or web address]&[email or web address]&[email or web address]&[email or web address]&fdu=875    Childbirth and Parenting Education:   [facility]: http://GingerdMontefiore Health SystemFarseer/   [phone]  [place]: (education, yoga & wellness) www.Nuevolution  Enlightened Mama: www.Kalpesh Wirelessenedmama.PanOptica   Childbirth collective: (Parent topic nights)  www.childbirthcollective.org/  Hypnobabies:  www.hypnobabiestwincities.com/  Hypnobirthing:  Http://hypnobirthing.com/    Breastfeeding Information:  An excellent 15-minute video on preparing for a good breastfeeding experience, including how to ensure you have a good milk supply and a comfortable latch, can be found here:  https://B[employer]PanOptica/      Book Recommendations:   Mallory Paula's Birthing From [facility]--first few chapters include a new-age tone, you may prefer to skip it and keep going, because there is good stuff later.  This book recommendation covers emotional preparation, but does cover coping with pain, and use of both pharmacological and nonpharmacological methods.    Dr. Ross' The Pregnancy Book and The Birth Book--the pregnancy book goes month-by month    Womanly Art of Breastfeeding by La Leche [employer] International   Bestfeeding by Mei Rodrigues--great pictures    Mothering Your Nursing Toddler, by Nilda Pina.   Addresses dealing with so many of the challenging behaviors of a nursing toddler.  How Weaning Happens, by La Leche League.  Discusses weaning at all ages, from medically necessary weaning of an infant, all the way up to age 5 (or  "older), with why/why not, and strategies.  Very empowering book both for deciding to wean and deciding not to.    American College of Nurse-Midwives (ACNM) http://www.midwife.org/; look at the informational handouts at http://www.midwife.org/Share-With-Women     www.mymidwife.org    Mother to Baby (Medication and Herbal guidance in pregnancy): http://www.mothertobaby.org  Toll-Free Hotline: 890.804.4715  LactMed (Medication use while breastfeeding): http://toxnet.nlm.nih.gov/newtoxnet/lactmed.htm    Women's Health.gov:  http://www.womenshealth.gov/a-z-topics/index.html    American pregnancy association - http://americanpregnancy.org    Centering Pregnancy (group prenatal care option): http://centeringhealthcare.org    Information about doulas:  Childbirth collective: http://www.childbirthcollective.org/  Doulas of [place] (FERNANDA):  www.fernanda.org  [employer] [facility]  project: http://ValetAnywherecitiesdoulaproject.com/     Early Childhood and Family Education (ECFE):  ECFE offers parents hands-on learning experiences that will nourish a lifetime of teachable moments.  http://ecfe.info/ecfe-home/     [place] www.Fanium.Afrimarket     FDA - Nutrition  www.mypyramid.gov  Under \"For Consumers,\" click on \"pregnant and breastfeeding women.\"      Centers for Disease Control and Prevention (CDC) - Vaccines : http://www.cdc.gov/vaccines/       When researching information on the web, question the validity of websites.  The domains .gov, .edu and.org tend to be more reliable information.  If there are a lot of advertisements, be cautious of the information provided. Stay away from blogs and chat rooms please!      Screening Program  Http://www.health.[facility].[place]/newbornscreening/  [place] newborns are tested soon after birth for more than 50 hidden, rare disorders, including hearing loss and critical congenital heart disease (CCHD). This site provides resources and information for families and providers.    HEALTHY " PREGNANCY CARE: 37 to 41 WEEKS PREGNANT    Talk with your midwife or physician about when to call with signs of labor    Regular uterine contractions that are getting closer together and/or stronger    If you think your water has broken or is leaking    Bleeding from the vagina like a period (bloody vaginal discharge is normal)    If you are not feeling your baby move    Make plans for transportation and  as needed for when you are going to the hospital.    Your midwife or physician may offer to check your cervix for changes.     Ask your health care provider about vaccinations you may need following delivery. By now, you should have received a Tdap immunization to protect against pertussis or whooping cough. Fathers and family members who will be in close contact with the baby should also receive a Tdap shot at least two weeks before the expected birth of the baby if they have not had a Td (tetanus) shot for at least two years.    If you are past your due date, discuss the next steps leading to delivery with your midwife or physician. If you don't start labor on your own by 41 or 42 weeks, your midwife or physician may recommend giving you medicines to ripen your cervix and start labor.  Induction of labor: http://onlinelibrary.hernandez.com/store/10.1016/j.jmwh.2008.04.018/asset/j.jmwh.2008.04.018.pdf?v=1&t=ccgk9uzp&y=[email or web address]    Preparing for your baby: Tell your midwife or physician how you plan to feed your baby (breast or bottle), who you have chosen to do pediatric care for your baby, and if you have a boy, whether you have chosen to have him circumcised. You will need a car seat correctly installed in your vehicle to bring your baby home. As you start to set up the nursery at home for your baby, make sure the crib is safe. The mattress needs to fit snugly against the edges of the crib. If you can fit a soda can between the bars, they are too far apart and can allow the  baby's head to caught between them.    Learn about infant care and feeding, including information about infant CPR. We recommend that you put your baby to sleep on his or her back to reduce the chance of Sudden Infant Death Syndrome (SIDS). To maintain a healthy environment in which your child can grow, it's best to keep your home smoke-free. By preparing ahead, your transition into parenthood will go smoothly for you and your baby.    Your midwife or physician will want to see you for a checkup 2 to 6 weeks after delivery.    If you have questions about any symptoms you are experiencing or any other concerns, call your provider or their clinic staff at [facility] at Phone: [phone]. If it's after clinic hours, physician patients should call the [facility] at [phone] ([phone]); midwife patients should call their answering service at [phone].    How can you care for yourself at home?   You can refer to the Starting Out Right book or find it online at http://www.healtheast.org/images/stories/maternity/[facility]-Starting-Out-Right.pdf or http://www.[facility].org/images/stories/flipbooks/[facility]-starting-out-right/[facility]-starting-out-right.html#p=8     You can sign up for a weekly parenting e-mail that gives support, tips and advice from health care professionals that starts with pregnancy and continues through the toddler years. To register, go to www.[facility].org/baby at any time during your pregnancy.        Making Plans for Feeding My Baby    By this point, you probably have read a lot about feeding your baby.  Breastfeed or formula? Each mother s decision is her own and [facility] respects you and your choices. We ve gathered information on both breastfeeding and formula feeding to help with your decision. Talking with your physician or nurse-midwife can also help in your decision.  However you plan to feed your baby, [facility] Centers encourage  rooming in with your baby, skin-to-skin contact and feeding your baby based on his or her cues.    Skin-to-skin contact  Being close to mom helps your baby adjust to life outside of the womb.  It helps your baby regulate their body temperature, heart rate, and breathing.  Your baby will usually be placed skin-to-skin immediately following birth or as soon as possible, if medical intervention is needed.    Rooming-In  Having your baby stay with you in your room is called  rooming-in .  Keeping your baby in your room helps you to learn how to care for your baby by getting to know your baby s cues, body rhythms and sleep cycle.       Cue-based feeding  Cues (signals) are baby s way of telling you what he or she wants.  When you learn your infant s cues, you know how to care for and feed your baby.   Feeding cues are the licking and smacking of lips, bringing their fist to their mouth, and a reflex called  rooting - where baby turns and opens his or her mouth, searching for the breast or bottle.  Crying is a late feeding cue.  Babies can feed frequently, often at least 8 times in 24 hours.    Breastfeeding facts  Breast milk is the best source of nutrition for your baby and is available at birth. In the first couple of days, your milk volume is already starting to increase, though it may not be noticeable. Breastfeed frequently to increase your milk supply. Within three to five days, you will begin to notice larger milk volumes. An increase in breast size, heaviness and firmness are often described as the milk  coming in.  Frequent breastfeeding can help breasts from getting overly firm and painful. You will know the baby is getting enough milk if your baby is having wet and dirty diapers and gaining weight.     If your goal is to exclusively breastfeed, it is important to not use any formula or artificial nipples (including bottles and pacifiers) while your baby is learning to breastfeed.  While it may seem like an   easy  option to give your baby a bottle, formula should only be given if there is a medical reason for your baby to have it.      Positioning and attachment   Get comfortable.  Use pillows as needed to support your arms and baby.  Hold baby close at the level of your breast, facing you in a tummy to tummy position.  Skin to skin helps with this.  Position the baby with his or her nose by the nipple.  There should be a straight line from baby s ear to shoulder to hips.  Tickle your baby s lips or wait for baby to open mouth wide, bring baby to breast by leading with the chin.  Aim the nipple at the roof of baby s mouth.  A rapid sucking pattern is followed by longer, drawing pattern with occasional swallows heard.  When baby is correctly latched, your nipple and much of the areola are pulled well into baby s mouth.      Returning to work or school  Focus on a good start to breastfeeding.  Many women continue to provide breastmilk for their baby when they return to work or school.  Making plans about where to pump and store milk can make the transition go well.  Talk with other mothers who have also returned to work or school for tips and support.  Your employer s Human Resource department may be a resource as well.     Returning to work or school: (continued)     babies can mean fewer  sick  days for you.    A quality breast pump will also save time and add comfort.  Check with your insurance prior to giving birth for breast pump coverage.  Many insurance companies include a pump within your benefits.    Wait until your baby is at least three weeks old to introduce a bottle for the first time.  Have someone besides you give the bottle.    Breastfeed when you are with your baby. Reserve your bottles of breast milk for when you are away.     Your breasts will need to be  emptied  either by your baby or a pump.  Plan to pump at least twice in an eight hour day.    If you cannot pump at work, continue  breastfeeding at home. Any amount of breast milk is worth giving to your baby.    Formula feeding facts  If you are planning to use formula to feed your baby, you will want to make some preparations ahead of time. Talk to your doctor or nurse-midwife about what type of formula to use. Some are iron-fortified, meaning they have extra iron in them. You will want to purchase formula and bottles before your baby is born to be sure you are ready after you return from the hospital. The [facility] do not provide formula samples to take home.    Be sure to follow formula mixing directions closely. Regular milk in the dairy case at the grocery store should not be given to babies under 1 year old. Baby formula is sold in several forms including:    Ready-to-use. This is the most expensive, but no mixing is necessary.    Concentrated liquid. This is less expensive than ready-to-use and you mix with water.    Powder. This is the least expensive. You mix one level scoop of powdered formula with two ounces of water and stir well.    Most babies need 2.5 ounces of formula per pound of body weight each day. This means an 8-pound baby may drink about 20 ounces of formula a day; however, this is just an estimate. The most important thing is to pay attention to your baby s cues.  If your baby is always fussy, needs more iron or has certain food allergies, your physician may suggest you change your baby s formula to a different kind.     How do I warm my baby s bottles?  You may feed your baby a bottle without warming it first. It is OK for the breast milk or formula to be cool or room temperature. If your baby seems to prefer it warmed, you can put the filled bottle in a container of warm water and let it stand for a few minutes. Check the temperature of the liquid on your skin before feeding it to your baby; to be sure it isn t too hot. Do not heat bottles in the microwave. Microwaves heat food and liquids unevenly, and  this can cause hot spots that can burn your baby.    How do I clean and sterilize bottles?  Sterilize bottles and nipples before you use them for the first time. You can do this by putting them in boiling water for 5 minutes. After that first time, you can wash them in hot and soapy water. Rinse them carefully to be sure there is no soap left on them. You can also wash them in the .    [facility] [phone] ([phone])

## 2021-06-01 NOTE — ANESTHESIA PROCEDURE NOTES
Spinal Block    Patient location during procedure: OR  Start time: 9/30/2019 7:41 AM  End time: 9/30/2019 7:44 AM  Reason for block: primary anesthetic    Staffing:  Performing  Anesthesiologist: Bernabe Ojeda MD    Preanesthetic Checklist  Completed: patient identified, risks, benefits, and alternatives discussed, timeout performed, consent obtained, airway assessed, oxygen available, suction available, emergency drugs available and hand hygiene performed  Spinal Block  Patient position: sitting  Prep: ChloraPrep  Patient monitoring: heart rate, cardiac monitor, continuous pulse ox and blood pressure  Approach: midline  Location: L3-4  Injection technique: single-shot  Needle type: pencil-tip   Needle gauge: 24 G      Additional Notes:  Single pass. No paresthesia. Clear CSF

## 2021-06-01 NOTE — PROGRESS NOTES
Documentation: Comprehensive OB F/U US performed at [facility] on 9/23/19.   BPP: 8/8. EFW >90%ile. Discrepancy noted on report between presentation (breech) vs recommendations section stating baby is now cephalic and patient's possible interest in TOLAc now vs RCS. Called [facility] and awaiting return call to have report clarified/addendum.  CALLI Wood,CNM

## 2021-06-02 NOTE — PROGRESS NOTES
Subjective:     Chief Complaint:   History of Present Illness:    Scheduled Procedure: Right foot skin tag removal  Surgery Date:  2019  Surgery Location: [facility]  Surgeon:  Dr. Trace Benson    Current Outpatient Medications   Medication Sig Dispense Refill     acetaminophen (TYLENOL ORAL) Take 1,000 mg by mouth.              ibuprofen (ADVIL,MOTRIN) 800 MG tablet Take 1 tablet (800 mg total) by mouth every 6 (six) hours. 40 tablet 0     PNV no.95/ferrous fum/folic ac (PRENATAL ORAL) Take by mouth.       No current facility-administered medications for this visit.        Allergies   Allergen Reactions     Penicillins Rash     rash         Immunization History   Administered Date(s) Administered     DT (pediatric) 1999     Influenza,seasonal quad, PF, =/> 6months 10/03/2019     Influenza,seasonal, Inj IIV3 10/01/2015     Meningococcal MPSV4, SQ 2002     Meningococcal,Historic,Unknown serogroups 2002     Td, Adult, Absorbed 1999     Td,adult,historic,unspecified 1999     Tdap 2011, 2019       Patient Active Problem List   Diagnosis     BMI 40.0-44.9, adult (H)     Family history of breast cancer     History of chlamydia     History of herpes genitalis     Supervision of high-risk pregnancy of elderly primigravida     Status post breast reduction     Calculus of kidney     Obesity affecting pregnancy in third trimester     Hydronephrosis of left kidney     History of primary  section     Skin abnormality       Past Medical History:   Diagnosis Date     Genital HSV      History of varicella as a child      Kidney stone        Past Surgical History:   Procedure Laterality Date      SECTION Bilateral 2019    Procedure: PRIMARY  SECTION FOR BREECH PRESENTATION;  Surgeon: Bethanie Rolon MD;  Location: [facility] L+D OR;  Service: Obstetrics     REDUCTION MAMMAPLASTY       TONSILLECTOMY  2006       Social History     Socioeconomic History      Marital status:      Spouse name: Abhi     Number of children: 1     Years of education: Not on file     Highest education level: Not on file   Occupational History     Occupation: RN     Employer: [employer]   Social Needs     Financial resource strain: Not on file     Food insecurity:     Worry: Not on file     Inability: Not on file     Transportation needs:     Medical: Not on file     Non-medical: Not on file   Tobacco Use     Smoking status: Never Smoker     Smokeless tobacco: Never Used   Substance and Sexual Activity     Alcohol use: Not Currently     Drug use: Never     Sexual activity: Yes     Partners: Male   Lifestyle     Physical activity:     Days per week: Not on file     Minutes per session: Not on file     Stress: Not on file   Relationships     Social connections:     Talks on phone: Not on file     Gets together: Not on file     Attends Hindu service: Not on file     Active member of club or organization: Not on file     Attends meetings of clubs or organizations: Not on file     Relationship status: Not on file     Intimate partner violence:     Fear of current or ex partner: Not on file     Emotionally abused: Not on file     Physically abused: Not on file     Forced sexual activity: Not on file   Other Topics Concern     Not on file   Social History Narrative     Not on file         Most recent Health Maintenance Visit:  8 month(s) ago    Pertinent History  Prior Anesthesia: no  Previous Anesthesia Reaction:  no  Diabetes: no  Cardiovascular Disease: no  Pulmonary Disease: no  Renal Disease: no (RECURRENT NEPHROLITHIASIS)  GI Disease: no  Sleep Apnea: no  Thromboembolic Problems: no  Clotting Disorder: no  Bleeding Disorder: no  Transfusion Reaction: no  Impaired Immunity: no  Steroid use in the last 6 months: no  Frequent Aspirin use: no    Family history of none    Social history of there is no transfusion refusal and NSAID use on occasion    After surgery, the  "patient plans to recover at home with family.    Any use of aspirin or ibuprofen within 7 days of surgery?  No  Anesthesia concerns/family history?: No  Exposure to tobacco smoke?:  No  Immunizations up-to-date?  Yes    Exposure in the past 3 weeks to:  Chicken pox:  No  Fifth Disease:  No  Whooping Cough: No  Measles:  No  Tuberculosis: No  Other: No    Review of Systems  Constitutional (fever, wt. Loss, fatigue):  Normal  Respiratory: Normal  Cardiovascular: Normal  GI/Hepatic: Normal  Neuro: Normal  Urinary Tract/Renal: Normal  Endocrine: Normal  Mental/Development: Normal  Vision/Hearin: Normal  Musculoskeletal: Normal  Skin: Normal  Bleeding Disorder: Normal  Tobacco/Alcohol/Drug Use: Normal / NA    Objective:         Vitals:    10/29/19 1229   BP: 112/64   Pulse: 74   Resp: 14   Weight: (!) 269 lb (122 kg)   Height: 5' 10\" (1.778 m)       Physical Exam:  General Appearance: Alert, cooperative, no distress, appears stated age  Head: Normocephalic, without obvious abnormality, atraumatic  Eyes: PERRL, conjunctiva/corneas clear, EOM's intact  Ears: Normal TM's and external ear canals, both ears.  Behind left ear is a 3 cm x 1 cm thickened patch of skin that is bumpy/textured without pigmentation or erythema.  Nose: Nares normal, septum midline,mucosa normal, no drainage  Throat: Lips, mucosa, and tongue normal; teeth and gums normal  Neck: Supple, symmetrical, trachea midline, no adenopathy;  thyroid: not enlarged, symmetric, no tenderness/mass/nodules; no carotid bruit or JVD  Back: Symmetric, no curvature, ROM normal, no CVA tenderness  Lungs: Clear to auscultation bilaterally, respirations unlabored  Breasts: No breast masses, tenderness, asymmetry, or nipple discharge.  Heart: Regular rate and rhythm, S1 and S2 normal, no murmur, rub, or gallop,   Abdomen: Soft, non-tender, bowel sounds active all four quadrants,  no masses, no organomegaly  Extremities: Extremities normal, atraumatic, no cyanosis or " edema  Skin: Skin color, texture, turgor normal, no rashes or lesions  Lymph nodes: Cervical, supraclavicular, and axillary nodes normal  Neurologic: Normal          Labs 10/29/2019:     HGB: 11.7 g/dL  Urine pregnancy: NEGATIVE    Assessment/Plan:      Visit for Preoperative Exam.     Patient approved for surgery with general or local anesthesia. Labs will be done as indicated. Proceed with proposed surgery without additional clinical clarifications.     Referred to dermatology for further evaluation of abnormal skin finding behind left ear.    Return to clinic in 2-weeks for her 6-week postpartum exam as scheduled.    Total time spent with patient: 25 minutes greater than 50% of which was spent counseling and coordinating care         Provider: CALLI Mittal, MARCO

## 2021-06-02 NOTE — TELEPHONE ENCOUNTER
Post Birth Follow Up PHONE CALL : Georgette Sanchez is a 34 y.o.  who delivered on 19 via Delivery Route:     Recommended postpartum follow up visit at 2-3 weeks to check in with the midwife regarding birthing experience, discuss any questions she may have regarding care, the healing process, and further recovery expectations. Patient Accepts at this time. Transferred to scheduling line/CNM  notified to call patient for appointment.  Nathalie Lowery CNM, CALLI,JOBYM

## 2021-06-02 NOTE — PROGRESS NOTES
Assessment/Plan:        Diagnoses and all orders for this visit:    Calculus of kidney  -     Urinalysis Macroscopic  -     Magnesium, 24 Hour Urine; Future; Expected date: 11/05/2019  -     Stone Formation, 24 Hour Urine (does not include Magnesium); Future; Expected date: 11/05/2019  -     Calcium, Ionized, Measured; Future; Expected date: 11/05/2019  -     Parathyroid Hormone Intact with Minerals; Future; Expected date: 11/05/2019  -     Uric Acid; Future; Expected date: 11/05/2019  -     Renal Function Profile; Future; Expected date: 11/05/2019  -     Patient Stated Goal: Prevent further stones  -     24 Hour Urine Collection Steps Education      Stone Management Plan  [facility] 10/22/2019   Urinary Tract Infection No suspicion of infection   Renal Colic Asymptomatic at this time   Renal Failure No suspicion of renal failure   Current CT date 10/22/2019   Right sided stones? Yes   R Number of ureteral stones No ureteral stones   R Number of kidney stones  2   R GSD of kidney stones 4 - 10   R Hydronephrosis None   R Stone Event No current event   R Current Plan Observe   Observe rationale Limited stone burden with good prognosis for spontaneous passage   Left sided stones? Yes   L Number of ureteral stones No ureteral stones   L Number of kidney stones  1   L GSD of kidney stones 2 - 4   L Hydronephrosis None   L Stone Event No current event   L Current Plan Observe   Observe rationale Limited stone burden with good prognosis for spontaneous passage         Subjective:      HPI  Ms. Georgette Simons is a 34 y.o.  female presenting to the [facility] [place] referred by [facility] for nephrolithiasis.    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 She has previously participated in stone risk evaluation with identified factors but is no longer adherent to recommendations. She has identified  modifiable stone risks including:  low urine volume, unclassified hypercalciuria and hypocitraturia. She has identified non-modifiable stone risks including:  early age at first stone and bilateral stones.    She has history of stone disease onset in her early 20's. She has passed multiple stones and was previously followed by Urology and Nephrology. She was maintained chlorthalidone with documented stone risks of low urine volume, hypercalciuria and hypocitraturia. She admits she self discontinued the medication and cannot recall reasoning. She is postpartum, underwent planned  ~ 3 weeks ago and had a healthy baby boy. Late in her 3rd trimester, she experienced flank pain and passed a couple stones. She is here to discuss results and re-establish care for her stone disease.     She is asymptomatic at present.. She denies symptoms of fever, chills, flank pain, nausea, vomiting, urinary frequency and dysuria.     CT scan from today is personally reviewed and demonstrates 2 right renal stones (2 mm and 5 mm). There is a 3 mm left lower pole renal stone. No hydronephrosis. No ureteral stones.    Significant labs from presentation include severe hematuria, no pyuria, negative nitrite and no bacteria.    PLAN    35 yo F early postpartum with hx of early onset calcium based stone disease, previously documented hypercalciuria and hypocitraturia. Recent stone passage events during pregnancy. Nonobstructing, bilateral renal stones.     Will initiate comprehensive stone risk evaluation. Serum stone risk chemistries including parathyroid hormone and ionized calcium will be obtained before next visit. Two 24 hour urine collections and dietary journal will be obtained at earliest covenience. Patient verbalized understanding. Patient agrees with plan as discussed. She will return to clinic when labs are available.         ROS   Review of Systems  A 12 point comprehensive review of systems is negative except for  HPI    Past Medical History:   Diagnosis Date     Genital HSV      History of varicella as a child      Kidney stone        Past Surgical History:   Procedure Laterality Date      SECTION Bilateral 2019    Procedure: PRIMARY  SECTION FOR BREECH PRESENTATION;  Surgeon: Bethanie Rolon MD;  Location: [facility];  Service: Obstetrics     REDUCTION MAMMAPLASTY       TONSILLECTOMY         Current Outpatient Medications   Medication Sig Dispense Refill     acetaminophen (TYLENOL ORAL) Take 1,000 mg by mouth.              ibuprofen (ADVIL,MOTRIN) 800 MG tablet Take 1 tablet (800 mg total) by mouth every 6 (six) hours. 40 tablet 0     PNV no.95/ferrous fum/folic ac (PRENATAL ORAL) Take by mouth.       ranitidine HCl (ZANTAC ORAL) Take 150 mg by mouth.              acyclovir (ZOVIRAX) 400 MG tablet Take 1 tablet (400 mg total) by mouth 3 (three) times a day. 60 tablet 2     ergocalciferol, vitamin D2, (VITAMIN D2 ORAL) Take by mouth.       ferrous sulfate 325 (65 FE) MG tablet Take 1 tablet by mouth daily with breakfast.       No current facility-administered medications for this visit.        Allergies   Allergen Reactions     Penicillins Rash     rash         Social History     Socioeconomic History     Marital status:      Spouse name: Not on file     Number of children: Not on file     Years of education: Not on file     Highest education level: Not on file   Occupational History     Not on file   Social Needs     Financial resource strain: Not on file     Food insecurity:     Worry: Not on file     Inability: Not on file     Transportation needs:     Medical: Not on file     Non-medical: Not on file   Tobacco Use     Smoking status: Never Smoker     Smokeless tobacco: Never Used   Substance and Sexual Activity     Alcohol use: Not Currently     Drug use: Never     Sexual activity: Yes     Partners: Male   Lifestyle     Physical activity:     Days per week: Not on file     Minutes  per session: Not on file     Stress: Not on file   Relationships     Social connections:     Talks on phone: Not on file     Gets together: Not on file     Attends Mormon service: Not on file     Active member of club or organization: Not on file     Attends meetings of clubs or organizations: Not on file     Relationship status: Not on file     Intimate partner violence:     Fear of current or ex partner: Not on file     Emotionally abused: Not on file     Physically abused: Not on file     Forced sexual activity: Not on file   Other Topics Concern     Not on file   Social History Narrative     Not on file       Family History   Problem Relation Age of Onset     Thyroid disease Mother      No Medical Problems Father      Hypertension Maternal Grandmother      Diabetes Maternal Grandfather      Breast cancer Maternal Aunt        Objective:      Physical Exam  Vitals:    10/22/19 1318   BP: 126/72   Pulse: 86   Temp: 97.7  F (36.5  C)     General - well developed, well nourished, appropriate for age. Appears no distress at this time   Heart - regular rate and rhythm, no murmur  Respiratory - normal effort, clear to auscultation, good air entry without adventitious noises  Abdomen - mildly obese soft, non-tender, no hepatosplenomegaly, no masses.   - no flank tenderness, no suprapubic tenderness, kidney and bladder non-palpable  MSK - normal spinal curvature. no spinal tenderness. normal gait. muscular strength intact.  Neurology - cranial nerves II-XII grossly intact, normal sensation, no unsteadiness  Skin - intact, no bruising, no gouty tophi  Psych - oriented to time, place, and person, normal mood and affect.    Labs  Urinalysis POC (Office):  Nitrite, UA   Date Value Ref Range Status   10/22/2019 Negative Negative Final   09/16/2019 Negative Negative Final   08/20/2019 Negative Negative Final       Lab Urinalysis:  Blood, UA   Date Value Ref Range Status   10/22/2019 Large (!) Negative Final   09/16/2019  Large (!) Negative Final   08/20/2019 Large (!) Negative Final     Nitrite, UA   Date Value Ref Range Status   10/22/2019 Negative Negative Final   09/16/2019 Negative Negative Final   08/20/2019 Negative Negative Final     Leukocytes, UA   Date Value Ref Range Status   10/22/2019 Trace (!) Negative Final   09/16/2019 Negative Negative Final   08/20/2019 Negative Negative Final     pH, UA   Date Value Ref Range Status   10/22/2019 6.5 5.0 - 8.0 Final   09/16/2019 6.0 4.5 - 8.0 Final   08/20/2019 7.0 4.5 - 8.0 Final    and Acute Labs   CBC   WBC   Date Value Ref Range Status   10/01/2019 11.6 (H) 4.0 - 11.0 thou/uL Final     Hemoglobin   Date Value Ref Range Status   10/01/2019 8.3 (L) 12.0 - 16.0 g/dL Final   09/30/2019 10.4 (L) 12.0 - 16.0 g/dL Final   09/09/2019 10.4 (L) 12.0 - 16.0 g/dL Final     Platelets   Date Value Ref Range Status   10/01/2019 210 140 - 440 thou/uL Final    and Renal Panel  [facility]IN[facility] results found for: CREATININE, CRCLEARANCE, K, CALCIUM

## 2021-06-02 NOTE — PROGRESS NOTES
Patient presents to the office today for a kidney stone consultation from a primary care provider.

## 2021-06-02 NOTE — PROGRESS NOTES
Assessment:          Normal postpartum recovery  S/P primary  section  history of breastfeeding difficulty  Negative depression screen  Dead toenail on left great toe  Corn on right foot    Plan:        -Routine postpartum care  -Referral to podiatry  -Outpatient lactation resources reviewed including [employer], [employer], community groups. Lactation referral provided as needed.  -Reviewed PP depression, anxiety, and psychosis s/sx, self care measures to reduce risk, safety plan and crisis numbers, and when to call for further resources. Written information provided for community resources and instructed to call on-call CNM with concerns or schedule clinic visit as needed.  -Return to clinic in 4 weeks for a routine exam following birth     -Plan hemoglobin at 6-week check    Subjective:       Georgette Roca is a 34 y.o. female who presents for a postpartum follow up visit. She is 2 weeks postpartum following a primary  section for breech. The delivery was at 39 gestational weeks. I have fully reviewed the prenatal and intrapartum course. The amount and color of the lochia is appropriate for the duration of recovery. Georgette feels well and postpartum course has been complicated by breastfeeding difficulty. Baby Sherif's  course has been stable. The baby, Sherif, is being fed by Expressed breast milk and formula.  Pumps between 20 and 60 mils at each session.  She denies concerns about baby's weight gain.  She reports that her milk supply is not adequate for his needs.  She has a history of breast reduction surgery.  Has been seeing lactation.  Asked about taking fenugreek and advised that this was okay to do and breast feeding.  Voiding without difficulty, lochia normal, tolerating normal diet, and passing flatus and normal bowel movements.  Pain is well controlled with current medications. Georgette has no history of depression and anxiety and offers no emotional concerns.   Postpartum depression screening score: 2, negative #10.  Has questions about a toenail that is dead on her left great toe and a corn that has not gone away on her right foot.  Requesting podiatry referral today.  Had 2-week visit with surgeon and incision was healing well.  Had hemoglobin done at that visit and it was 9.9.  Answered questions today about when it is okay to begin exercising and lifting restrictions.    Feels very well supported.  Will have 3 months off of work.  Has been has 5 weeks off.  They live with her parents who are very helpful.  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  May go down to 0.75 and a charge role.    The following portions of the patient's history were reviewed and updated as appropriate: allergies, current medications, problem list and problem list    Review of Systems  General:  Denies problem  Eyes: Denies problem  Ears/Nose/Throat: Denies problem  Cardiovascular: Denies problem  Respiratory:  Denies problem  Gastrointestinal:  Denies problem, Genitourinary: Denies problem  Musculoskeletal:  Denies problem  Skin: Corn on right foot, dead toenail on great left toe  Neurologic: Denies problem  Psychiatric: Denies problem  Endocrine: Denies problem  Heme/Lymphatic: Denies problem   Allergic/Immunologic: Denies problem          Objective:         Vitals:    10/15/19 0859   BP: 118/60   Pulse: 93   SpO2: 95%   Weight: (!) 269 lb (122 kg)         Physical Exam:  General Appearance: Alert, cooperative, no distress, appears stated age  HEENT: grossly normal  Respiratory: Normal respiratory effort  Cardiovascular: No peripheral edema.  Musculoskeletal: No digital cyanosis. Normal gait and station. Moves all limbs freely.  Neuro: Cranial nerves II-XII grossly intact.  Psych: Intact judgment and insight. OX3 and conversational.  Declines exam of incision today.       Total time with patient 20 minutes with >50% on education, counseling and coordination of care.  Nathalie Lowery,  CNM

## 2021-06-02 NOTE — TELEPHONE ENCOUNTER
"Pt called CNM on call reporting sudden onset of chills after waking from a nap.  Currently 4 days post C/S delivery.  States when she woke she was \"freezing and shaky\"  Her mother covered her in several blankets and the shaking has improved.  She checked her temp at it was 96.8.  She denies feeling ill.  Denies increased pain at her incision site.  Still taking narcotic pain medication for post op pain and feels her pain relief has been adequate.  Denies N&V.  Denies diarrhea and constipation.  Denies increased vaginal bleeding or abdominal tenderness.  Reports her breasts are full because her milk has come in, but denies s/s of mastitis.  Advised continued monitoring, call if pt develops fever or other warning signs.  Pt verbalizes understanding and agrees with plan.  Has CNM on call number.  "

## 2021-06-02 NOTE — PATIENT INSTRUCTIONS - HE
Patient Stated Goal: Prevent further stones  Steps for collecting a 24 hour urine specimen    Please follow the directions carefully. All urine voided for a 24-hour period needs to be collected into the jug.  DO NOT change any of your  normal  daily habits when doing this test. Continue to follow your regular diet, intake of fluids, and usual activity level. Pick the most convenient day with your schedule, perhaps on a weekend or a day off.    Start your Diet Log the day before collection and continue on the day of urine collection.  You MUST bring Diet Log with you on follow up visit to discuss results.    One 24hr Urine Collection     Two 24hr Urine Collections  (do not collect on consecutive days)    PLEASE COMPLETE THE 2nd JUG WITHIN 1-2 WEEKS FROM THE 1st JUG    STEP 1  Empty your bladder completely into the toilet. This will be your start time. Write your full legal name, start date and time on the jug label.  Collection start and stop times need to match exactly!  For example:  6 am to 6 am.    STEP 2  The next time you urinate, empty your bladder directly into the jug or collection hat and pour urine into the jug.  Screw the lid back onto the jug.  Do not spill!    STEP 3  Place the jug in the refrigerator or a cooler with ice during the collection period.  Failure to keep it cool could cause inaccurate test results. DO NOT Freeze.    STEP 4  Continue collecting all urine into the jug for the rest of the day, for the full 24 hours.  DO NOT stop early or go over 24 hours!    STEP 5  Exactly 24 hours from start of collection, write your full legal name, stop date and time on the jug label.   Collection start and stop times need to match exactly!  For example:  6 am to 6 am.  Failure to label correctly will result in recollection of urine specimen.    STEP 6  Return each jug within 24 hours after final urination.     STEP 7  Drop off jug locations:   [facility]: Mon-Fri 7am-7pm - Closed on  weekends  [facility]: Mon-Fri 7am-5pm - Closed on Sunday  [facility]: Mon-Fri 7am-6:30pm - Closed on weekends    STEP 8  Please call [facility] after return of your final jug to schedule your follow-up visit. [phone]

## 2021-06-02 NOTE — PROGRESS NOTES
"[facility] Pediatrics Lactation Visit     Assessment:     1.  difficulty in feeding at breast         Sherif transferred a minimal amount of milk today despite proper latching technique. His initial latch was shallow, but with gentle downward pressure on his chin he was able to open his mouth wide and grasp the breast. Mom had a breast reduction surgery 17 years ago which may prevent her from developing a full milk supply. She is able to pump about 20 ml today which is an improvement from yesterday. Discussed strategies to increase milk supply. Encouraged mom that Sherif will still benefit from breast milk even if he receives most of his calories from formula.      He does appear jaundiced to lower chest today but is waking on own for feedings and has had excellent weight gain since hospital discharge. TCB at discharge was in the low intermediate risk zone; opted not to check bilirubin level today.      Plan:     Continue to breastfeed on demand as often as desired     Offer both sides every time, and alternate which breast you start on. Latch baby deeply by making a \"breast sandwich,\" and aim your nipple for the roof of the mouth. If baby's lips are rolled inward, flip the top lip out with your finger, and then apply gentle downward pressure to the chin to help the lips flange out like \"fish lips.\" If you have pain that lasts beyond the initial latch-on, always restart. When sucking/swallowing frequency starts to slow down, do breast compressions/massage and tickle baby's feet to keep them alert with feeding. A diaper change between sides can be helpful to keep them alert.     Supplementation plan: Continue to offer him bottles of expressed breast milk or formula as he cues. See chart below for typical intake by age, but let him tell you what he needs.      Recommended to pump: More pumping will mean more milk. Whenever he does not nurse it's a good idea to pump when he takes a bottle. Your goal will be " good breast stimulation by pumping or nursing at least 8 times per day. Balance pumping with other goals like eating, resting, and taking care of Sherif.     Continue to monitor output, expect at least 6 wet diapers per day.      Recommended Vitamin D 400 IU daily.         Follow up on Tuesday for well child check, as needed for lactation support.      Average Infant Milk Intake by Age     Age Average milk volume per feeding (mL) Average milk volume per feeding (oz) Average 24 hour milk intake (mL) Average 24 hour milk intake (oz)   Day 1 Few drops - 5mL < tsp Up to 30 mL Up to 1 oz   Day 2 5 - 15 mL <0.5 oz - 1 TB 30 - 120 mL 1 - 4 oz   Day 3 15 - 30 mL  0.5 - 1 oz 120 - 240 mL 4 - 8 oz   Day 4 30 - 45 mL  1 - 1.5 oz 240 - 360 mL 8 - 12 oz   Day 5-7 45 - 60 mL 1.5 - 2 oz 360 - 600 mL 12 - 18 oz   Week 2-3 60 - 90 mL 2 - 3 oz 450 - 750 mL 15 - 25 oz   Months 1-6 90 - 150 mL 3 - 5 oz 750 - 1035 mL 25 - 35 oz            SUBJECTIVE:      Sherif is here today with mom, Georgette and dad, Abhi, for lactation support. He is a 5 days male born at Gestational Age: 39w0d now 5 days.    He is doing well. He has gained 4 oz since hospital discharge 2 days ago. He has gained approximately 2 oz per day over the past 2 days and is now -6% from birth weight.     Baby is nursing three times per day for about 30 minutes per session. He prefers the L side. He often nurses only on one side. He sometimes refuses to latch to the R side.  Mother reports not hearing audible swallows.   Baby feeds about 8 times in 24 hours.   Baby is supplemented with expressed breast milk or formula, about 2 oz after feeds (approximately 8 times per day).   Mom is also pumping about 3-4 per day and gets about 20 ml per pumping session.  Number of wet diapers in 24 hours: 7  Number of stools in 24 hours: 5  Color and consistency of stools: yellow/tan     Mom has had breast reduction surgery. She had this 17 years ago. She is not sure what type of surgery  this was.     Breastfeeding Goals: Hoping to provide him with breast milk either by breast or bottle.      Previous Breastfeeding Experience: first baby  Breast-surgery:  See above  Maternal medications: ibuprofen, tylenol, oxycodone, PNV, stool softener     Hospital course:Infant with concerns of difficulty breast-feeding and is down 9% from birthweight. Weight loss is stable from yesterday. He is voiding and stooling. Infant with a visible upper lip frenulum. Mother reports no pain with breast-feeding. Mother also with history of breast-reduction. Will continue to monitor feedings. Mother reports infant is able to latch on without a nipple shield. But would like more lactation support today. Infant's bilirubin 11.3 at 71 hours, low intermediate risk. Hyperbilirubinemia risk factors include: exclusive breast-feeding and difficulty with nursing.            Results for orders placed or performed during the hospital encounter of 19   [place] Metabolic Screen   Result Value Ref Range     Scan Result See Scanned Report        No current outpatient medications on file.  No past medical history on file.  No past surgical history on file.        Family History   Problem Relation Age of Onset     Thyroid disease Maternal Grandmother           Copied from mother's family history at birth     No Medical Problems Maternal Grandfather           Copied from mother's family history at birth            Primary care provider: Roseanna Pardo MD     OBJECTIVE:     Mother:   Nipples are everted, the areola is compressible, the breast is soft and full.      Sore nipples: None   Maternal depression screening: Doing well  EPDS: not assessed today     Infant:      Age today: 5 days     There were no vitals filed for this visit.        Weight:       Wt Readings from Last 3 Encounters:   10/05/19 8 lb 0.2 oz (3.634 kg) (58 %, Z= 0.20)*   10/03/19 7 lb 11.3 oz (3.496 kg) (53 %, Z= 0.08)*      * Growth percentiles are based on WHO  (Boys, 0-2 years) data.         Birthweight:  8 lb 8 oz (3.856 kg).   Today's weight:    Vitals       Vitals:     10/05/19 1035   Weight: 8 lb 0.2 oz (3.634 kg)      . This is -6% from birth weight.   Average weight gain over last 2 days: 2.5 oz/day.        Test weights:        LEFT side: 0 oz  RIGHT side: 0.1 oz     TOTAL transfer:  0.1 oz        Feeding assessment:      Digital suck assessment:  Infant draws consultant's finger into mouth, palate intact, tongue over gums, normal frenulum.      Baby can hold suction with tongue while at the breast.      Alignment: Baby's head was aligned with its trunk. Baby did face mother. Baby was in cradle position today.      Areolar Grasp: Baby was able to open mouth wide. Baby's lips were not pursed. Baby's lips did flange outward. Tongue was visible just barely over bottom lip. Baby had complete seal.      Areolar Compression: Baby made rhythmic motion. There were no clicking or smacking sounds. There was no severe nipple discomfort.  Nipples appeared round after feeding.     Audible swallowing: Baby did not make quiet sounds of swallowing: No appreciable milk ejection reflex. Mom's milk supply appears low.      PHYSICAL EXAM     Gen: Alert, no acute distress.   Head: Anterior fontanelle flat and soft.   Mouth:Lips pink. Oral mucosa moist. Tongue midline (good lateralization, movement, and lift; able to extend pass lower gumline).  Palate intact. Coordinated suck.  Lungs: Clear to auscultation bilaterally.   Cardiac: Regular regular rate and rhythm, S1S2, no murmurs.  Abdomen: Soft, nontender, bowel sounds present, no hepatosplenomegaly or mass palpable. Umbilicus dry with no erythema or drainage.   : Hunter stage 1 male genitalia  Skin: Intact, dry, appropriate coloring for ethnicity, mild jaundice to lower chest.   Neuro: Appropriate muscle tone.     The visit lasted a total of 60 minutes that I spent face to face with the patient. Of that time, 60 minutes were spent on  lactation. Over 50% of the time was spent counseling and educating the patient about feeding difficulties and lactation concerns.      Completed by:   MISAEL Dye, IBCLC, [facility], Pediatrics.  10/5/2019 2:51 PM

## 2021-06-02 NOTE — PATIENT INSTRUCTIONS - HE
POSTPARTUM INFORMATION AND RESOURCES:    Congratulations on the birth of your baby. We have gathered together some information on staying healthy in the postpartum time including information on exercise, nutrition and mental health. We have also listed some local and national resources for lactation support and mental health support.    If you have questions or concerns and need to speak with a midwife immediately, please call the on-call midwife at [phone].    If you have a non-emergent question or would like to schedule a follow up appointment, please call the clinic midwife at [phone].    Thank you for sharing your birth experience with the [facility]wives.  Congratulations on the birth of your baby!    ---------------------------------------------------------------------------------------------------------  EXERCISE & NUTRITION:     Getting and Staying Active: A Healthy You!   -Why should I exercise? Exercise, being physically active, is very important for all women. Being active can help you:   Lose or maintain weight   Have more energy   Sleep better   Enjoy sex more   Relieve stress and think better   Lower the chance you will have heart disease, high blood pressure, and diabetes   Strengthen your bones and muscles   Have fewer hot flashes if you are older   -How active do I have to be to get the bene?ts of exercise?  Studies show that as little as 15 minutes of moderate exercise--like fast walking or dancing--3 times a week can improve the health of your heart. Ifyou want to get the best benefits from exercise, try to increase your physical activity to at least 30 minutes, 5 times a week. If you have a serious health problem,be sure to talk with your health care provider before starting an exercise program.     Taking Care of your Health: Health Care Maintenance Screening recommendations   In all the things women do, taking care of everything and everybody else, they sometimes forget to take  care of themselves. This handout reviews the health screenings and vaccines that are recommended for women of all ages. Talk with yourhealth care provider about which tests and vaccines you need. The chart below lists the screening tests and vaccinations recommended for healthy women who do not have a high risk for most diseases.   The recommendations in thischart are guidelines only. For some tests and vaccines, the chart says you should talk to your health care provider.This is because the best recommendations for you depend on your personal health history. Your health care provider may suggest more frequent testing or additional tests ifyou havea higher chance of getting some diseases     Planning Your Family: Developing a Reproductive Life Plan   Planning ahead can help you avoid getting pregnant when you don t want to be pregnant and also be in good health if and when you do decide to become pregnant. Many women have at least one pregnancy in their lives, even if it was not planned. In fact, about half of all pregnancies are not planned. Getting pregnant when you did not plan it can be a problem, or it can turn into a happy event. Planning pregnancy leads to healthier pregnancies, healthier mothers, and healthier families.   Although many women want to have a family, not everyone wants to have children. More and more women are childless by choice (also known as childfree). Whether to have children is a personal choice that only you can make. It s okay not to want children! If you never want to get pregnant, it is important to make sure you always use very effective birth control, such as an intrauterine device, the birth control implant, female sterilization (having your tubes tied), or your partner having a vasectomy.     Reliable resources:   ?   American College of Nurse-Midwives (ACNM) http://www.midwife.org/; look at the informational handouts at http://www.midwife.org/Share-With-Women   ??   Women's  "Health.gov:   http://www.womenshealth.gov/a-z-topics/index.html   FDA - Nutrition ?www.mypyramid.gov? Under \"For Consumers,\" click on \"pregnant and breastfeeding women.\"   ?   Vaccines : Centers for Disease Control and Prevention (CDC) http://www.cdc.gov/vaccines/   ?   [facility] www.[place] AltoAvimoto.com   ?   When researching information on the web, question the validity of websites.? The domains .gov, [employer] andAvimotoorg tend to be more reliable information.? If there are a lot of advertisements, be cautious of the information provided. Stay away from blogs and chat rooms please!   ?   ?   Nutrition and supplements:   Daily multivitamin vitamin (with 400 - 1000 mcg folic acid).? Take with food as needed.   ?   4-5 servings of dairy or other calcium rich foods (fish, leafy greens, soy)?per day - if not, take 500-1000 mg additional calcium (Tums, pills, chews). Take with dairy.   ?   Vitamin D3 4000 IU geltab daily. Vitamin D rich foods: Cod Liver Oil (1Tbsp), [place], Mackerel, Tuna, fortified milk and yogurt, Beef and liver, sardines, egg, fortified cereals, swiss cheese.? Take supplements with fattiest meal.?   ?   2-3 (4) oz servings of fish, seafood, nuts (walnuts & almonds), oils, avocado per week - if not, take Omega 3 Fatty acids: DHA & AYAZ 8028-7882 mg per day. ?Other names: cod liver oil, fish oil. Take with fattiest meal.   ?   ?---------------------------------------------------------------------------------------------------------  POSTPARTUM & LACTATION RESOURCES :    Breastfeeding:   OUTPATIENT LACTATION RESOURCES   -Schedule an appointment with a [facility] who is also a Lactation Consultant by calling [phone]. Nathalie Kwok IBCLC, CNM at [facility] on Monday, JOBY TownsendM at [facility] on Tuesdays and [facility] on Thursdays.   [facility] located at [facility] and [facility]   Call: [phone].     Inpatient support     Outpatient " appointments     Telephone consultation     Breast-feeding classes available through [employer]     [facility]/WIC/[facility] health improvement partnership:       Baby Café    Pregnant and interested in breastfeeding?  Need answers to breastfeeding questions?  Want to help breastfeeding moms?  Already breastfeeding and want to meet other moms?    Join us at the Baby Café!    Baby Cafe is a free, drop-in service offering breast-feeding support for pregnant women, breast-feeding mothers and their families.  Come share tips and socialize with other mothers.  Babies and siblings are welcome (no childcare available).    Starting April 2018, Baby Café will be at 4 locations.  Please see below for the Baby Café closest to you!      [facility]  [address] [place] [address]  1st Wednesday: 10am-12pm    [facility]  [address] [place] [address]  3rd Wednesday 4-6pm    [facility]  [address] [place] [address]  4th Wednesday 10am-12:30pm    [facility]  [address] [place] [address]  4th Wednesdays: 4-6pm      Hmong, Uruguayan, and Ugandan which is may be available at some sites.    For more information, please contact: [email or web address]. or [phone]    -[facility]:  www.[facility]Gen[facility]Buttercoin   -Attend a baby weigh in at [place]. Lactation consultants are available to answer questions   [place]: Tuesdays 1:00 - 2:00   [facility]: Mondays 1:00 - 2:00   -Attend a New Mamma group or a Second Time Mama group at [place].     -Enlightened Mama: www.enlightenedmama.com   -Attend one of the New Mama groups at [facility] in [facility]. [facility] also offers one-on-one in home and in office lactation consults.     -Neetaeatiffanye: www.nick.org/   -Attend a LeLeche League meeting. Multiple groups in several locations throughout the [facility]. The meetings are no-cost  "and always informative breastfeeding education session through [facility] La Leche League     - Information on medication use while breastfeeding: http://toxnet.nlm.nih.gov/newtoxnet/lactmed.htm     Other Online Breastfeeding Resources:     [facility]al[facility]ast.org/baby sign up for free online weekly e-mail     healtheast.org/maternity     Breastfeedingmadesimple.com     Llli.org (La Leche League)     Normalfed.com     Womenshealth.gov/breastfeeding     Workandpump.com     Kellymom.Widget[employer]abs    https://[employer]/abcs/   Click on \"Learn More About Attachment\"    -New Parent Connection Drop-In:  In collaboration with [place], Early Childhood Family Education (ECFE), a program of [address], offers ongoing classes for new parents in their infants.  The connection classes offer support and resources to parents during the exciting and challenging period of transition following the birth of her baby.  Parents and babies (birth to 6 months of age) may join the group at any time.  Baby's birth to 3 months meet , from 1230 to 1:30 PM  Babies 3-6 months meet , from 4 to 5 PM    -[facility] New Mama Group: www.SpinalMotion/free-new-mama-group  -Attend eYeka New Mama. Groups located at three locations:  [place], [place] and [place]. Sign up online.       Additional Resources:?   -American College of Nurse-Midwives (ACNM) http://www.midwife.org/; look at the informational handouts at http://www.midwife.org/Share-With-Women  www.mymidwife.org   ?   -Women's Health.gov:   http://www.womenshealth.gov/a-z-topics/index.html   ?   -Early Childhood and Family Education (ECFE):   ECFE offers parents hands-on learning experiences that will nourish a lifetime of teachable moments.   http://ecfe.info/ecfe-home/       -----------------------------------------------------------------------------------------------------------   (Before, during and after pregnancy)   MOOD DISORDER " RESOURCES :    Are you feeling sad or depressed?     Do you feel more irritable or angry with those around you?     Are you having difficulty bonding with your baby?     Do you feel anxious or panicky?     Are you having problems with eating or sleeping?     Are you having upsetting thoughts that you can t get out of your mind?     Do you feel as if you are  out of control  or  going crazy ?     Do you feel like you never should have become a mother?     Are you worried that you might hurt your baby or yourself?    Any of these symptoms, and many more, could indicate that you have a form of  mood or anxiety disorder, such as postpartum depression. While many women experience some mild mood changes during or after the birth of a child, 15 to 20% of women experience more significant symptoms of depression or anxiety. Please know that with informed care you can prevent a worsening of these symptoms and can fully recover. There is no reason to continue to suffer.  Women of every culture, age, income level and race can develop  mood and anxiety disorders. Symptoms can appear any time during pregnancy and the first 12 months after childbirth. There are effective and well-researched treatment options to help you recover. Although the term  postpartum depression  is most often used, there are actually several forms of illness that women may experience.    Resources:    -Pregnancy and Postpartum Support [place]: www.[facility] Centerupportmn.org  Who We Are: We are a group of mental health &  practitioners, service organizations, and mother volunteers who provides services to those struggling with a pregnancy, loss, or postpartum mood disorder through the Helpline, professional training, our resource list and website.  What We Do: We provide support, advocacy, awareness, and training about  mental health in [place].     Community Resource List:   This is a list of  resources within  "our community.   http://[facility]ortmn.org/communityresourcelist    PSYCHOTHERAPISTS/CONSULTANTS   This is a list of licensed mental health professionals who have advanced clinical skills in the treatment of postpartum mood and anxiety disorders (PMADs).   Http://[facility].org/mentalhealthproviderresourcelist   INTEGRATIVE MEDICINE PRACTITIONERS:   This is a list of licensed providers who practice Integrative Medicine: a form of treatment that combines alternative medicine with evidence based medicine in an effort to treat the  whole person  (the person, not just the disease).   http://[facility] Hospitalupportmn.org/integrativemedicineproviders    PSYCHIATRIC CARE   This is a list of licensed Psychiatrists who have advanced clinical skills in the treatment and medication management for PMADs and lactating mothers.   Http://[facility].org/psychiatryproviderresroucelist   Click on the links below to find detailed profiles and contact information of providers who have been screened and approved as having advanced training in the area of PMADs. Please note: [facility] does not endorse a specific provider.   The list is in alphabetical order by city. You can also search for providers by city or [facility] code using the search box. Please click on the \"Show\" box to the upper right to advance to the next page. You may also call our Helpline at [phone] if you would like for our Helpline volunteer to find a provider for you.    -Postpartum Depression Support Group:   Weekly groups at no cost through [facility], , 1:30-3:00 p.m., at [facility], [address] [place] [address], [address]. [place], , 1:30-3:00 p.m., at [facility], [address] To register for the group or get more information, call [phone].   -Postpartum Depression Support Group:   Outpatient Intensive Treatment program for women with  mood disorders [facility] " Mother/Baby Program     -[facility]  Resource Guide     [facility] s [facility] at [facility]  This website provides a range of current information including discussion of new research findings in women s mental health and how such investigations inform day-to-day clinical practice. Despite the growing number of studies being conducted in women s health, the clinical implications of such work are frequently controversial, leaving patients with questions regarding the most appropriate path to follow. Providing these resources to patients and their doctors so that individual clinical decisions can be made in a thoughtful and collaborative fashion dovetails with the mission of our Center.    https://womenSouthern [facility] Adult Mental Health Serviceshealth.org/      If you re having thoughts of harming yourself or your baby, it is vital to get support immediately. Call 911 or go to the nearest E.R.     TOLL-FREE / NATIONWIDE EMERGENCY ASSISTANCE   Immediate Emergency:  911   National Suicide Prevention Lifeline:   1-466.727.3710   Suicide Prevention Hotline:   8-992-SFGNMYP   National Postpartum Depression Hotline:   -PPD-MOMS   Postpartum Support International (PSI)   PPD Helpline: (not a 24-hour hotline)   1-962.538.3550

## 2021-06-03 VITALS
WEIGHT: 269 LBS | HEART RATE: 93 BPM | DIASTOLIC BLOOD PRESSURE: 60 MMHG | OXYGEN SATURATION: 95 % | SYSTOLIC BLOOD PRESSURE: 118 MMHG | BODY MASS INDEX: 38.6 KG/M2

## 2021-06-03 VITALS
WEIGHT: 293 LBS | HEART RATE: 82 BPM | RESPIRATION RATE: 18 BRPM | BODY MASS INDEX: 41.95 KG/M2 | SYSTOLIC BLOOD PRESSURE: 110 MMHG | DIASTOLIC BLOOD PRESSURE: 66 MMHG | HEIGHT: 70 IN

## 2021-06-03 VITALS
HEART RATE: 88 BPM | DIASTOLIC BLOOD PRESSURE: 70 MMHG | SYSTOLIC BLOOD PRESSURE: 104 MMHG | WEIGHT: 293 LBS | BODY MASS INDEX: 41.95 KG/M2 | HEIGHT: 70 IN

## 2021-06-03 VITALS
WEIGHT: 293 LBS | SYSTOLIC BLOOD PRESSURE: 124 MMHG | HEART RATE: 92 BPM | BODY MASS INDEX: 41.95 KG/M2 | HEIGHT: 70 IN | DIASTOLIC BLOOD PRESSURE: 74 MMHG

## 2021-06-03 VITALS
HEART RATE: 68 BPM | SYSTOLIC BLOOD PRESSURE: 104 MMHG | DIASTOLIC BLOOD PRESSURE: 56 MMHG | WEIGHT: 274.6 LBS | RESPIRATION RATE: 16 BRPM | HEIGHT: 70 IN | BODY MASS INDEX: 39.31 KG/M2

## 2021-06-03 VITALS
BODY MASS INDEX: 38.51 KG/M2 | WEIGHT: 269 LBS | HEART RATE: 74 BPM | RESPIRATION RATE: 14 BRPM | HEIGHT: 70 IN | DIASTOLIC BLOOD PRESSURE: 64 MMHG | SYSTOLIC BLOOD PRESSURE: 112 MMHG

## 2021-06-03 VITALS — WEIGHT: 293 LBS | BODY MASS INDEX: 41.95 KG/M2 | HEIGHT: 70 IN

## 2021-06-03 VITALS
DIASTOLIC BLOOD PRESSURE: 68 MMHG | HEART RATE: 92 BPM | SYSTOLIC BLOOD PRESSURE: 110 MMHG | BODY MASS INDEX: 41.95 KG/M2 | HEIGHT: 70 IN | WEIGHT: 293 LBS

## 2021-06-03 VITALS — BODY MASS INDEX: 41.95 KG/M2 | WEIGHT: 293 LBS | HEIGHT: 70 IN

## 2021-06-03 VITALS — BODY MASS INDEX: 41.95 KG/M2 | HEIGHT: 70 IN | WEIGHT: 293 LBS

## 2021-06-03 NOTE — PROGRESS NOTES
Assessment:      34 y.o., routine Mirena IUD check - 1  months post-insertion, correct placement confirmed.        Plan:     1.  Reassurance re: correct placement, normalcy of side effects and that these often lessen with extended use of IUD.   2.  Recommended taking ibuprofen 600 mg q1gipuk x 5 days to reduce amount and duration of bleeding.    3.  Taught and encouraged to check IUD strings monthly.    4.  Warning signs related to IUD use (PAINS) and when to call CNM reviewed.    Subjective:      Georgette Simons is a 34 y.o. female who presents for IUD check. This was inserted on 11/7/19. The patient has no complaints today. The patient is sexually active. She has not  checked her IUD strings.      Review of Systems  Pertinent items are noted in HPI.     Objective:     Pelvic:SE only - normal vaginal and mucosal tissue.  No abnormal discharge or odor.  Cervix normal appearance with IUD strings visible x 2 without evidence of IUD itself.  Approximately 2 cm long.

## 2021-06-03 NOTE — PROGRESS NOTES
Routine 6 week Postpartum Visit  Assessment:   Normal postpartum exam at ~6 weeks postpartum s/p primary CS  formula feeding   History of bilateral breast reduction      Plan:    1. Pap smear not indicated at today's visit. Next due for cervical cancer screening March 2023 . Due for annual Gyn exam in November 2020.  2. Desired contraception: IUD. Mirena, reviewed MOA and questions answered at her 2 week PP. Reviewed today R/B/A and all questions answered about insertion procedure.   3. Discussed resumption of exercise and setting a goal to attain ideal weight for height in the next 6-12 months.   4.  Resumption of intercourse reviewed with possible changes in libido and vaginal lubrication while nursing.  5.  Nutrition and supplements reviewed.  Advised continuation of a prenatal or multivitamin, also Vitamin D3 5000 IU geltab daily and an omega 3 fatty acid supplement.  6. Adjustment to parenting, self care and open communication with her support system discussed. Warning signs and symptoms related to postpartum mood disorders reviewed.     Subjective:     Georgette Simons is a 34 y.o. female who presents for postpartum visit. She is 6 weeks postpartum following a Primary LTCS.  I have fully reviewed the prenatal and intrapartum course. The delivery was at Term and 39 weeks gestation Her baby boy is named Sherif and weighed 8 lbs 8 oz at birth.     Postpartum course has been stable. Notes feeling better after about a week and a half. Baby Sherif's course has been stable. Baby is feeding by Bottle only with formula. Lochia ceased at 2 weeks postpartum.  Bowel function is normal. Bladder function is normal. She has resumed intercourse without concerns. Desired contraception: IUD, Mirena. [place] postpartum depression screening score: 0, negative #10, no concerns. She has not resumed regular exercise. Georgette Simons returns to work in 6 weeks.    Review of Systems  General:  Denies problem  Eyes: Denies  problem  Ears/Nose/Throat: Denies problem  Cardiovascular: Denies problem  Respiratory:  Denies problem  Gastrointestinal:  As above in HPI, Genitourinary: As above in HPI  Musculoskeletal:  Denies problem  Skin: Denies problem  Neurologic: Denies problem  Psychiatric: As above in HPI  Endocrine: Denies problem    Objective:         Physical Exam:  General Appearance: Alert, cooperative, no distress, appears stated age  Skin: Skin color, texture, turgor normal, no rashes or lesions  Throat: Lips, mucosa, and tongue normal; teeth and gums normal  HEENT: grossly normal; otoscopic and opthalmic exam not performed.   Neck: Supple, symmetrical, trachea midline, no adenopathy;  thyroid: not enlarged, symmetric, no tenderness/mass/nodules  Lungs: Clear to auscultation bilaterally, respirations unlabored  Breasts: deferred  Heart: Regular rate and rhythm, S1 and S2 normal, no murmur, rub, or gallop  Abdomen: Soft, non-tender.  Incision well healed, diastasis 1FB  Pelvic:External genitalia normal without lesions or irritation. Vagina and cervix show no lesions, inflammation, discharge or tenderness. . No cystocele, No rectocele. Uterus fully involuted. No adnexal mass or tenderness. IUD inserted without difficulty, see separate note.  Extremities: Extremities normal without varicosities or edema       Last Pap: March 2018.. Results were: NIL and HPV Negative    Immunization History   Administered Date(s) Administered     DT (pediatric) 01/01/1999     Influenza,seasonal quad, PF, =/> 6months 10/03/2019     Influenza,seasonal, Inj IIV3 10/01/2015     Meningococcal MPSV4, SQ 08/16/2002     Meningococcal,Historic,Unknown serogroups 08/16/2002     Td, Adult, Absorbed 01/01/1999     Td,adult,historic,unspecified 01/01/1999     Tdap 09/14/2011, 07/19/2019     Immunization status: up to date and documented    Total time with patient 40 minutes with >50% on education, counseling and coordination of care.    Mary Beth Landry DNP,  APRN, CNM

## 2021-06-03 NOTE — ANESTHESIA PREPROCEDURE EVALUATION
Anesthesia Evaluation      Patient summary reviewed   History of anesthetic complications (PONV)     Airway   Mallampati: III  Neck ROM: full   Pulmonary - negative ROS and normal exam   (-) asthma, not a smoker                         Cardiovascular - negative ROS and normal exam  Exercise tolerance: > or = 4 METS   Neuro/Psych - negative ROS     Endo/Other    (+) obesity,   (-) not pregnant (8 weeks postpartum)     GI/Hepatic/Renal    (-) GERDChronic renal disease: stones.     Other findings:     NPO > 8 hrs     Results for ASTRID PETTIT (MRN [id number]) as of 11/21/2019 11/21/2019 10:11  Pregnancy Test, Urine: Negative        Dental - normal exam                        Anesthesia Plan  Planned anesthetic: MAC and total IV anesthesia    Scop patch  TIVA    ASA 2   Induction: intravenous   Anesthetic plan and risks discussed with: patient and spouse  Anesthesia plan special considerations: antiemetics,   Post-op plan: routine recovery

## 2021-06-03 NOTE — PROGRESS NOTES
Assessment:      34 y.o., routine Mirena IUD insertion.     IUD in place and stable    Plan:     1.  Reviewed, witnessed and signed IUD insertion consent form and reviewed alternative options for BCM available. Patient desires IUD insertion today.   2. Reassurance re: correct placement, normalcy of side effects and that these often lessen with extended use of IUD.   3.  Recommended taking ibuprofen 600 mg q 6hours x 5 days to reduce amount and duration of bleeding.    4.  Taught and encouraged to check IUD strings monthly.    5.  Warning signs related to IUD use (PAINS) and when to call CNM reviewed  including to call with any fever, severe back pain, severe abdominal pain, heavy bleeding (soaking more than 1 pad per hour)         Subjective:      Georgette Simons is a 34 y.o. female who presents for IUD insertion. She is currently 6 weeks postpartum.     6 week postpartum exam on 11/7/19, IUD consult done at that time.     Reports no unprotected intercourse in the last two weeks.     Urine pregnancy test was performed in clinic and was negative. Pt education included mechanism of action of Mirena IUD. The risks and benefits of the procedure were explained as below to the patient and informed consent was obtained and consent form signed.   Georgette was given an opportunity to ask questions about all forms of birth control, meaning all prescriptions, non-prescription, and natural methods. All of her questions were answered to her satisfaction and she understood all of those answers. She understands that no method of birth control, except abstinence, is 100% effective against pregnancy or jaye sexually transmitted diseases, including Human Immunodeficiency Virus (HIV) infection that leads to the Acquired Immunodeficiency Syndrome (AIDS) disease. The following benefits, risk/side effects, warning signs, control method, intrauterine decisions to discontinue use option, regarding the birth control method,  intrauterine device, were explained to her before she voluntarily decided to use this method of birth control.   BENEFITS: The IUD is 97-99% effective if all the directions regarding its use are followed carfeully. It can be more effective if used with foam or condoms mid-cycle between periods. IUDs containing progestin may decrease menstrual flow and painful menstrual periods. The IUD provides longer protection from pregnancy (Paragard- 10 years; Mirena - 5 years)   RISKS/ SIDE EFFECTS   1. Spotting, bleeding, hemorrhage or anemia   2. Cramping or pain   3. Partial or complete expulsion of device leading to pregnancy, the pregnancy ending in miscarriage   4. Lost IUD string or other string problems   5. Puncturing of the uterus, embedding, or cervical perforation   6. Increased risk of pelvic inflammatory disease   WARNING SIGNS: The patient was advised to call the clinic if she has any of the following early warning signs:   P - Period late (pregnancy), abnormal spotting or bleeding   A - Abdominal pain, pain with intercourse   I - Infection exposure (such as gonorrhea), abnormal discharge   N - Not feeling well, fever, chills   S - String missing, shorter or longer   ALTERNATIVES: She received written information about other methods of birth control and she chose the IUD. She understands that she should check for the IUD strings several times during the first few months after insertion and then after each monthly period or before intercourse.   DECISION TO DISCONTINUE USE: She understands that she may have the IUD removed at any time. She knows not try to remove the device and that it should be removed only by a medical provider. If she does not desire to become pregnant, she has been told she may request to have another IUD inserted or choose to use another method of birth control. If she wishes to become pregnant, she understands most women not using birth control become pregnant within 12 months.    Review of  Systems  A 12 point comprehensive review of systems was negative except as noted.     Objective:     Vitals:    11/07/19 1325   BP: 104/56   Pulse: 68   Resp: 16       IUD Insertion Procedure Note    Pre-operative Diagnosis: undesired fertility    Post-operative Diagnosis: stable and IUD in place    Indications: Contraception    Procedure Details   Bimanual exam performed revealing a anteflexed uterus, midline, non-tender, negative CMT. Cervix is nulliparous, long, thick, closed. Sterile speculum placed. Offered GC/CT, declined and not collected. Cervix cleansed with Betadine. Tenaculum placed on cervix at 11 o'clock and 2 o'clock. Uterus sounded to 10 cm. IUD inserted without difficulty. String visible and trimmed to 3 cm. Tenaculum removed. Minimal bleeding noted. Patient tolerated procedure well. Did not have any periods of syncope, sat up and ambulated with ease.     IUD Information:  Mirena, Expiration date Nov 2021.  Lot # [id number]    Condition:  Stable    Complications:  None        Total time with patient 20 mn for the IUD insertion    Mary Beth Landry, SCOT, APRN, CNM

## 2021-06-03 NOTE — ANESTHESIA POSTPROCEDURE EVALUATION
Patient: Georgette RODAS Rouandrew  EXCISION SKIN LESION RIGHT FOOT  Anesthesia type: MAC    Patient location: Phase II Recovery  Last vitals:   Vitals Value Taken Time   /60 11/21/2019 12:01 PM   Temp 36.4  C (97.6  F) 11/21/2019 12:00 PM   Pulse 59 11/21/2019 12:26 PM   Resp 14 11/21/2019 12:00 PM   SpO2 98 % 11/21/2019 12:26 PM   Vitals shown include unvalidated device data.  Post vital signs: stable  Level of consciousness: awake and responds to simple questions  Post-anesthesia pain: pain controlled  Post-anesthesia nausea and vomiting: no  Pulmonary: unassisted, return to baseline  Cardiovascular: stable and blood pressure at baseline  Hydration: adequate  Anesthetic events: no    QCDR Measures:  ASA# 11 - [place]-op Cardiac Arrest: ASA11B - Patient did NOT experience unanticipated cardiac arrest  ASA# 12 - [place]-op Mortality Rate: ASA12B - Patient did NOT die  ASA# 13 - PACU Re-Intubation Rate: NA - No ETT / LMA used for case  ASA# 10 - Composite Anes Safety: ASA10A - No serious adverse event    Additional Notes:

## 2021-06-03 NOTE — PROGRESS NOTES
Assessment/Plan:        Diagnoses and all orders for this visit:    Low urine output    Hyperoxaluria    Calculus of kidney  -     Urinalysis Macroscopic      Stone Management Plan  [facility] 10/22/2019 11/13/2019   Urinary Tract Infection No suspicion of infection No suspicion of infection   Renal Colic Asymptomatic at this time Asymptomatic at this time   Renal Failure No suspicion of renal failure No suspicion of renal failure   Current CT date 10/22/2019 -   Right sided stones? Yes -   R Number of ureteral stones No ureteral stones -   R Number of kidney stones  2 -   R GSD of kidney stones 4 - 10 -   R Hydronephrosis None -   R Stone Event No current event No current event   R Current Plan Observe Observe   Observe rationale Limited stone burden with good prognosis for spontaneous passage Limited stone burden with good prognosis for spontaneous passage   Left sided stones? Yes -   L Number of ureteral stones No ureteral stones -   L Number of kidney stones  1 -   L GSD of kidney stones 2 - 4 -   L Hydronephrosis None -   L Stone Event No current event No current event   L Current Plan Observe Observe   Observe rationale Limited stone burden with good prognosis for spontaneous passage Limited stone burden with good prognosis for spontaneous passage             Subjective:      HPI  Ms. Georgette Simons is a 34 y.o.  female returning to the [facility] [place] for ongoing management of stone risk reduction.     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 She has previously participated in stone risk evaluation with identified factors but is no longer adherent to recommendations. She has identified modifiable stone risks including:  low urine volume, unclassified hypercalciuria and hypocitraturia. She has identified non-modifiable stone risks including:  early age at first stone and  bilateral stones.     She is asymptomatic at present. She denies symptoms of fever, chills, flank pain, nausea, vomiting, urinary frequency and dysuria.    Objectives for this encounter include evaluating repeat 24 hour urine collection to evaluate current risk factors.    Serum chemistries are all normal. One 24 hour urine collection demonstrates mild low urine volume (1900 mL), creatinine (1545 mg), calcium (201 mg), sodium (179 mmol), citrate (428 mg), mild hyperoxaluria (44 mg), and pH (6). Dietary journal demonstrates: is not available for review.    PLAN    35 yo F early postpartum with hx of early onset calcium based stone disease, current risk factors of low urine volume and hyperoxaluria. Nonobstructing, bilateral renal stones.     Based on review of lab results with the patient, she will transition to long term management and return in 12 months with repeat imaging. She will initiate increased fluid consumption to generate at least 2,000 ml urine daily and initiate avoidance of oxalate rich foods.       ROS   Review of systems is negative except for HPI.    Past Medical History:   Diagnosis Date     Genital HSV      History of varicella as a child      Kidney stone        Past Surgical History:   Procedure Laterality Date      SECTION Bilateral 2019    Procedure: PRIMARY  SECTION FOR BREECH PRESENTATION;  Surgeon: Bethanie Rolon MD;  Location: [facility] OR;  Service: Obstetrics     REDUCTION MAMMAPLASTY       TONSILLECTOMY  2006       Current Outpatient Medications   Medication Sig Dispense Refill     fluconazole (DIFLUCAN) 200 MG tablet Take once a week.             levonorgestrel (MIRENA) 20 mcg/24 hours (5 yrs) 52 mg IUD 1 each by Intrauterine route once. Mirena IUD placed on 19.  Due for removal/replacement by 24.  Lot: [id number]  Exp: 2021       No current facility-administered medications for this visit.        Allergies   Allergen Reactions     Penicillins  Rash     rash         Social History     Socioeconomic History     Marital status:      Spouse name: Abhi     Number of children: 1     Years of education: Not on file     Highest education level: Not on file   Occupational History     Occupation: RN     Employer: [employer]   Social Needs     Financial resource strain: Not on file     Food insecurity:     Worry: Not on file     Inability: Not on file     Transportation needs:     Medical: Not on file     Non-medical: Not on file   Tobacco Use     Smoking status: Never Smoker     Smokeless tobacco: Never Used   Substance and Sexual Activity     Alcohol use: Not Currently     Drug use: Never     Sexual activity: Yes     Partners: Male   Lifestyle     Physical activity:     Days per week: Not on file     Minutes per session: Not on file     Stress: Not on file   Relationships     Social connections:     Talks on phone: Not on file     Gets together: Not on file     Attends Gnosticism service: Not on file     Active member of club or organization: Not on file     Attends meetings of clubs or organizations: Not on file     Relationship status: Not on file     Intimate partner violence:     Fear of current or ex partner: Not on file     Emotionally abused: Not on file     Physically abused: Not on file     Forced sexual activity: Not on file   Other Topics Concern     Not on file   Social History Narrative     Not on file       Family History   Problem Relation Age of Onset     Thyroid disease Mother      No Medical Problems Father      Hypertension Maternal Grandmother      Diabetes Maternal Grandfather      Breast cancer Maternal Aunt      Objective:      Physical Exam  Vitals:    11/13/19 1346   BP: 112/69   Pulse: 76   Temp: 98.1  F (36.7  C)     General - well developed, well nourished, appropriate for age. Appears no distress at this time  Abdomen - soft, non-tender, no hepatosplenomegaly, no masses.   - no flank tenderness, no suprapubic  tenderness, kidney and bladder non-palpable  MSK - normal spinal curvature. no spinal tenderness. normal gait. muscular strength intact.  Psych - oriented to time, place, and person, normal mood and affect.    Labs   Urinalysis POC (Office):  Nitrite, UA   Date Value Ref Range Status   11/13/2019 Negative Negative Final   10/22/2019 Negative Negative Final   09/16/2019 Negative Negative Final       Lab Urinalysis:  Blood, UA   Date Value Ref Range Status   11/13/2019 Large (!) Negative Final   10/22/2019 Large (!) Negative Final   09/16/2019 Large (!) Negative Final     Nitrite, UA   Date Value Ref Range Status   11/13/2019 Negative Negative Final   10/22/2019 Negative Negative Final   09/16/2019 Negative Negative Final     Leukocytes, UA   Date Value Ref Range Status   11/13/2019 Trace (!) Negative Final   10/22/2019 Trace (!) Negative Final   09/16/2019 Negative Negative Final     pH, UA   Date Value Ref Range Status   11/13/2019 7.0 5.0 - 8.0 Final   10/22/2019 6.5 5.0 - 8.0 Final   09/16/2019 6.0 4.5 - 8.0 Final    and Stone prevention labs   Serum chemistries   Creatinine   Date Value Ref Range Status   10/22/2019 0.82 0.60 - 1.10 mg/dL Final   10/22/2019 0.80 0.60 - 1.10 mg/dL Final     Potassium   Date Value Ref Range Status   10/22/2019 3.9 3.5 - 5.0 mmol/L Final     Calcium   Date Value Ref Range Status   10/22/2019 9.8 8.5 - 10.5 mg/dL Final   10/22/2019 9.7 8.5 - 10.5 mg/dL Final     Phosphorus   Date Value Ref Range Status   10/22/2019 3.3 2.5 - 4.5 mg/dL Final   10/22/2019 3.0 2.5 - 4.5 mg/dL Final     Uric Acid   Date Value Ref Range Status   10/22/2019 7.1 2.0 - 7.5 mg/dL Final   , Calcium metabolism   Calcium, Ionized Measured   Date Value Ref Range Status   10/22/2019 1.21 1.11 - 1.30 mmol/L Final      PTH   Date Value Ref Range Status   10/22/2019 53 10 - 86 pg/mL Final     No results found for: [id number]  and 24 hour urine   Calcium, 24H Urine   Date Value Ref Range Status   10/28/2019 201 20 -  275 mg/24hr Final     Comment:     Hypercalciuria >350  Values are for persons with  average daily calcium intake  (600-800 mg/day)     Sodium, 24 Hour Urine   Date Value Ref Range Status   10/28/2019 179 40 - 217 mmol/24hr Final     Citrate, 24 Hour Urine   Date Value Ref Range Status   10/28/2019 428 257-1,191 mg/24hr Final     Comment:     Reference Ranges are not  established for 0-19 years  or >60 years of age.     Oxalate, 24 Hour Urine   Date Value Ref Range Status   10/28/2019 43.5 7.0 - 44.0 mg/24hr Final     pH, Urine   Date Value Ref Range Status   10/28/2019 6.0 4.5 - 8.0 Final     Urine Volume   Date Value Ref Range Status   10/28/2019 1,900 mL Final

## 2021-06-03 NOTE — PATIENT INSTRUCTIONS - HE
POSTPARTUM INFORMATION AND RESOURCES:    Congratulations on the birth of your baby. We have gathered together some information on staying healthy in the postpartum time including information on exercise, nutrition and mental health. We have also listed some local and national resources for lactation support and mental health support.    If you have questions or concerns and need to speak with a midwife immediately, please call the on-call midwife at [phone].    If you have a non-emergent question or would like to schedule a follow up appointment, please call the clinic midwife at [phone].    Thank you for sharing your birth experience with the [facility]wives.  Congratulations on the birth of your baby!    ---------------------------------------------------------------------------------------------------------  EXERCISE & NUTRITION:     Getting and Staying Active: A Healthy You!   -Why should I exercise? Exercise, being physically active, is very important for all women. Being active can help you:   Lose or maintain weight   Have more energy   Sleep better   Enjoy sex more   Relieve stress and think better   Lower the chance you will have heart disease, high blood pressure, and diabetes   Strengthen your bones and muscles   Have fewer hot flashes if you are older   -How active do I have to be to get the bene?ts of exercise?  Studies show that as little as 15 minutes of moderate exercise--like fast walking or dancing--3 times a week can improve the health of your heart. Ifyou want to get the best benefits from exercise, try to increase your physical activity to at least 30 minutes, 5 times a week. If you have a serious health problem,be sure to talk with your health care provider before starting an exercise program.     Taking Care of your Health: Health Care Maintenance Screening recommendations   In all the things women do, taking care of everything and everybody else, they sometimes forget to take  care of themselves. This handout reviews the health screenings and vaccines that are recommended for women of all ages. Talk with yourhealth care provider about which tests and vaccines you need. The chart below lists the screening tests and vaccinations recommended for healthy women who do not have a high risk for most diseases.   The recommendations in thischart are guidelines only. For some tests and vaccines, the chart says you should talk to your health care provider.This is because the best recommendations for you depend on your personal health history. Your health care provider may suggest more frequent testing or additional tests ifyou havea higher chance of getting some diseases     Planning Your Family: Developing a Reproductive Life Plan   Planning ahead can help you avoid getting pregnant when you don t want to be pregnant and also be in good health if and when you do decide to become pregnant. Many women have at least one pregnancy in their lives, even if it was not planned. In fact, about half of all pregnancies are not planned. Getting pregnant when you did not plan it can be a problem, or it can turn into a happy event. Planning pregnancy leads to healthier pregnancies, healthier mothers, and healthier families.   Although many women want to have a family, not everyone wants to have children. More and more women are childless by choice (also known as childfree). Whether to have children is a personal choice that only you can make. It s okay not to want children! If you never want to get pregnant, it is important to make sure you always use very effective birth control, such as an intrauterine device, the birth control implant, female sterilization (having your tubes tied), or your partner having a vasectomy.     Reliable resources:   ?   American College of Nurse-Midwives (ACNM) http://www.midwife.org/; look at the informational handouts at http://www.midwife.org/Share-With-Women   ??   Women's  "Health.gov:   http://www.womenshealth.gov/a-z-topics/index.html   FDA - Nutrition ?www.mypyramid.gov? Under \"For Consumers,\" click on \"pregnant and breastfeeding women.\"   ?   Vaccines : Centers for Disease Control and Prevention (CDC) http://www.cdc.gov/vaccines/   ?   [facility] www.WalhallaEmpathica.com   ?   When researching information on the web, question the validity of websites.? The domains .gov, OrangeHRM andInPhase Technologiesorg tend to be more reliable information.? If there are a lot of advertisements, be cautious of the information provided. Stay away from blogs and chat rooms please!   ?   ?   Nutrition and supplements:   Daily multivitamin vitamin (with 400 - 1000 mcg folic acid).? Take with food as needed.   ?   4-5 servings of dairy or other calcium rich foods (fish, leafy greens, soy)?per day - if not, take 500-1000 mg additional calcium (Tums, pills, chews). Take with dairy.   ?   Vitamin D3 4000 IU geltab daily. Vitamin D rich foods: Cod Liver Oil (1Tbsp), [place], Mackerel, Tuna, fortified milk and yogurt, Beef and liver, sardines, egg, fortified cereals, swiss cheese.? Take supplements with fattiest meal.?   ?   2-3 (4) oz servings of fish, seafood, nuts (walnuts & almonds), oils, avocado per week - if not, take Omega 3 Fatty acids: DHA & AYAZ 1664-5783 mg per day. ?Other names: cod liver oil, fish oil. Take with fattiest meal.   ?   ?---------------------------------------------------------------------------------------------------------  POSTPARTUM & LACTATION RESOURCES :    Breastfeeding:   OUTPATIENT LACTATION RESOURCES   -Schedule an appointment with a [facility] MARCO who is also a Lactation Consultant by calling [phone]. Nathalie Kwok IBCLC, CNM at [facility] on Monday, JOBY TownsendM at [facility] on Tuesdays and [facility] on Thursdays.   [facility] Lactation Clinics located at [facility] and [facility]   Call: [phone].     Inpatient support     Outpatient " appointments     Telephone consultation     Breast-feeding classes available through [employer]     [facility]/WIC/[facility] health improvement partnership:       Baby Café    Pregnant and interested in breastfeeding?  Need answers to breastfeeding questions?  Want to help breastfeeding moms?  Already breastfeeding and want to meet other moms?    Join us at the Baby Café!    Baby Cafe is a free, drop-in service offering breast-feeding support for pregnant women, breast-feeding mothers and their families.  Come share tips and socialize with other mothers.  Babies and siblings are welcome (no childcare available).    Starting April 2018, Baby Café will be at 4 locations.  Please see below for the Baby Café closest to you!      [facility]  [address] [place] [address]  1st Wednesday: 10am-12pm    [facility]  [address] [place] [address]  3rd Wednesday 4-6pm    [facility]  [address] [place] [address]  4th Wednesday 10am-12:30pm    [facility] [employer] [facility]  [address] [place] [address]  4th Wednesdays: 4-6pm      Hmong, Dutch, and Ecuadorean which is may be available at some sites.    For more information, please contact: [email or web address]. or [phone]    -[facility]:  www.[facility] Los AngelesMusicAll   -Attend a baby weigh in at [place]. Lactation consultants are available to answer questions   [place]: Tuesdays 1:00 - 2:00   [facility]: Mondays 1:00 - 2:00   -Attend a New Mamma group or a Second Time Mama group at [place].     -Enlightened Mama: www.enlightenedmama.com   -Attend one of the New Mama groups at [facility] in [facility]. [facility] also offers one-on-one in home and in office lactation consults.     -Neetaeatiffanye: www.nick.org/   -Attend a LeLeche League meeting. Multiple groups in several locations throughout the [facility]. The meetings are no-cost  "and always informative breastfeeding education session through [facility] La Leche League     - Information on medication use while breastfeeding: http://toxnet.nlm.nih.gov/newtoxnet/lactmed.htm     Other Online Breastfeeding Resources:     healtheast.org/baby sign up for free online weekly e-mail     healtheast.org/maternity     Breastfeedingmadesimple.com     Llli.org (La Leche League)     Normalfed.com     Womenshealth.gov/breastfeeding     Workandpump.com     Kellymom.ERLink    https://[employer]/abcs/   Click on \"Learn More About Attachment\"    -New Parent Connection Drop-In:  In collaboration with [place], Early Childhood Family Education (ECFE), a program of [address], offers ongoing classes for new parents in their infants.  The connection classes offer support and resources to parents during the exciting and challenging period of transition following the birth of her baby.  Parents and babies (birth to 6 months of age) may join the group at any time.  Baby's birth to 3 months meet , from 1230 to 1:30 PM  Babies 3-6 months meet , from 4 to 5 PM    -[employer] New Mama Group: www.[employer]/free-new-mama-group  -Attend EpiBone New Mama. Groups located at three locations:  [place], [place] and [place]. Sign up online.       Additional Resources:?   -American College of Nurse-Midwives (ACNM) http://www.midwife.org/; look at the informational handouts at http://www.midwife.org/Share-With-Women  www.mymidwife.org   ?   -Women's Health.gov:   http://www.womenshealth.gov/a-z-topics/index.html   ?   -Early Childhood and Family Education (ECFE):   ECFE offers parents hands-on learning experiences that will nourish a lifetime of teachable moments.   http://ecfe.info/ecfe-home/       -----------------------------------------------------------------------------------------------------------   (Before, during and after pregnancy)   MOOD DISORDER " RESOURCES :    Are you feeling sad or depressed?     Do you feel more irritable or angry with those around you?     Are you having difficulty bonding with your baby?     Do you feel anxious or panicky?     Are you having problems with eating or sleeping?     Are you having upsetting thoughts that you can t get out of your mind?     Do you feel as if you are  out of control  or  going crazy ?     Do you feel like you never should have become a mother?     Are you worried that you might hurt your baby or yourself?    Any of these symptoms, and many more, could indicate that you have a form of  mood or anxiety disorder, such as postpartum depression. While many women experience some mild mood changes during or after the birth of a child, 15 to 20% of women experience more significant symptoms of depression or anxiety. Please know that with informed care you can prevent a worsening of these symptoms and can fully recover. There is no reason to continue to suffer.  Women of every culture, age, income level and race can develop  mood and anxiety disorders. Symptoms can appear any time during pregnancy and the first 12 months after childbirth. There are effective and well-researched treatment options to help you recover. Although the term  postpartum depression  is most often used, there are actually several forms of illness that women may experience.    Resources:    -Pregnancy and Postpartum Support [place]: www.[facility] Hospitalupportmn.org  Who We Are: We are a group of mental health &  practitioners, service organizations, and mother volunteers who provides services to those struggling with a pregnancy, loss, or postpartum mood disorder through the Helpline, professional training, our resource list and website.  What We Do: We provide support, advocacy, awareness, and training about  mental health in [place].     Community Resource List:   This is a list of  resources within  "our community.   http://[facility]upportmn.org/communityresourcelist    PSYCHOTHERAPISTS/CONSULTANTS   This is a list of licensed mental health professionals who have advanced clinical skills in the treatment of postpartum mood and anxiety disorders (PMADs).   Http://[facility].org/mentalhealthproviderresourcelist   INTEGRATIVE MEDICINE PRACTITIONERS:   This is a list of licensed providers who practice Integrative Medicine: a form of treatment that combines alternative medicine with evidence based medicine in an effort to treat the  whole person  (the person, not just the disease).   http://[facility] Hospitalupportmn.org/integrativemedicineproviders    PSYCHIATRIC CARE   This is a list of licensed Psychiatrists who have advanced clinical skills in the treatment and medication management for PMADs and lactating mothers.   Http://[facility].org/psychiatryproviderresroucelist   Click on the links below to find detailed profiles and contact information of providers who have been screened and approved as having advanced training in the area of PMADs. Please note: [facility] does not endorse a specific provider.   The list is in alphabetical order by city. You can also search for providers by city or [facility] code using the search box. Please click on the \"Show\" box to the upper right to advance to the next page. You may also call our Helpline at [phone] if you would like for our Helpline volunteer to find a provider for you.    -Postpartum Depression Support Group:   Weekly groups at no cost through [facility], , 1:30-3:00 p.m., at [facility], [address] [place] [address], [address]. [place], , 1:30-3:00 p.m., at [facility], [address] To register for the group or get more information, call [phone].   -Postpartum Depression Support Group:   Outpatient Intensive Treatment program for women with  mood disorders [facility] " Mother/Baby Program     -[facility]  Resource Guide     [facility] s Mental Health at [facility]  This website provides a range of current information including discussion of new research findings in women s mental health and how such investigations inform day-to-day clinical practice. Despite the growing number of studies being conducted in women s health, the clinical implications of such work are frequently controversial, leaving patients with questions regarding the most appropriate path to follow. Providing these resources to patients and their doctors so that individual clinical decisions can be made in a thoughtful and collaborative fashion dovetails with the mission of our Center.    https://womenentalhealth.org/      If you re having thoughts of harming yourself or your baby, it is vital to get support immediately. Call 911 or go to the nearest E.R.     TOLL-FREE / NATIONWIDE EMERGENCY ASSISTANCE   Immediate Emergency:  911   National Suicide Prevention Lifeline:   1-388.778.7922   Suicide Prevention Hotline:   5-355-NZERYOF   National Postpartum Depression Hotline:   -PPD-MOMS   Postpartum Support International (PSI)   PPD Helpline: (not a 24-hour hotline)   1-431.266.2231   WARNING SIGNS: Call the clinic with any of the following early warning signs:   P - Period late (pregnancy), abnormal spotting or bleeding   A - Abdominal pain, pain with intercourse   I - Infection exposure (such as gonorrhea), abnormal discharge   N - Not feeling well, fever, chills   S - String missing, shorter or longer     Here is a great resource to learn more about your IUD! This webpage has instructions on how to feel for your strings (scroll to the bottom): https://www.bedsider.org/methods/iud#how_to   We always encourage a visit back in clinic at 4-6 weeks after placement so that we can view the IUD strings with an exam and confirm that the IUD is still in place as expected by measuring the  length of the strings. Ongoing after that visit, many women are able to perform a self exam or a partner is able to feel the strings. The key is to feel for the cervix! Good luck and we're happy to teach this skill at your visit.   In the meantime, if you can't feel your strings, you may elect to observe for signs of expelling the device (partner has pain with intercourse by abrasion with the plastic end that should be in the uterus) or visually seeing the strings on the outside of your body (they are short enough to always be within your vagina). It's fine to use a back up method of pregnancy prevention, such as condoms, until your clinic visit.   Good luck navigating the website and information and we look forward to seeing you again in clinic.  [place],   Mary Beth Landry, DNP, APRN, CNM

## 2021-06-03 NOTE — PATIENT INSTRUCTIONS - HE
Calcium Oxalate Stone Prevention Self Management    Drink more fluids:    Drinking more liquids is the best way you can help prevent future stones. Stones can form when substances in the urine are too concentrated. The more you drink, the more urine you will make. This means all substances in the urine will be less concentrated.    How much urine should I be producing?    The usual recommended daily urine production is about 2 to 3 quarts (7989-8790 ml). If you are producing more than 3 quarts of urine on a regular basis, it is possible to deplete important minerals stored in the body.    To measure the amount of urine you produce in a day, you can either:    Collect all urine in a container and measure at the end of the day     Use a measuring cup each time you urinate and add up the amounts at the end of the day     Observe    Color - Dark karen urine is concentrated. Light straw color or lighter is dilute and desirable     Odor - Concentrated urine tends to smell stronger. Dilute urine is nearly odorless    Ways to increase your fluid intake    Increasing the amount of fluid you drink is effective for all types of kidney stones. While water is commonly recommended, all fluids are effective for increasing the amount of urine your body produces.    Focus on starting a lifelong habit, rather than a short-term solution.     Keep liquids on hand that you like. Crystal Light is a low calorie appropriate choice.    Drink out of larger glasses. You'll tend to drink more with each serving.     Have an additional glass of fluid with each meal.     Keep a water or drink bottle at work and fill it regularly.     *If you are prone to fluid retention, consult your doctor before making changes to your fluid habits.    Low Oxalate Diet:    Avoid excess amounts or daily consumption of these foods:    All nuts and nut products including peanuts, almonds, pecans, peanut butter, almond milk    Rhubarb    Chocolate    Soybeans and  soy products     Spinach    Wheat Germ    Beets    Maintain a normal calcium diet:    Researches have found that people with low calcium intakes tend to have more stones. Foods with high calcium content are acceptable and include:    Dairy products (including milk, cheese and yogurt)    Meat and fish    Enriched cereals    Dark green vegetables    What about calcium supplements?     Many people take calcium supplements, either on their own or as prescribed by a doctor. Research has indicated that calcium supplements do not usually pose a risk for stone formation.  Calcium citrate is a better choice for a supplement.    Avoid excess salt:    Salt (sodium chloride) is found in abundance in many foods. High sodium levels in the urine can interfere with the kidney's handling of calcium.     Tips for reducing the salt in your diet:    Don't use salt at the table    Reduce the salt used in food preparation. Try 1/2 teaspoon when recipes call for 1 teaspoon.    Use herbs and spices for flavoring instead of salt.    Avoid salty foods.    Check the label before you buy or use a product. Note sodium and portion size information.    Try to consume less than 2,000 mg/day. (1 teaspoon = 2,000 mg)    Foods with high sodium content include:    Processed meat (including luncheon meats, sausage)     Crackers     Instant cereal     Processed cheese     Canned soups     Chips and snack foods     Soy sauce    [facility] can respond to your questions or concerns 24 hours a day at [phone].

## 2021-06-03 NOTE — PROGRESS NOTES
Patient presents to the office today for a stone prevention visit to discuss 24hr urine test results.

## 2021-06-03 NOTE — ANESTHESIA CARE TRANSFER NOTE
Last vitals:   Vitals:    11/21/19 1200   BP: 107/60   Pulse: 75   Resp: 14   Temp: 36.4  C (97.6  F)   SpO2: 96%     Patient's level of consciousness is awake  Spontaneous respirations: yes  Maintains airway independently: yes  Dentition unchanged: yes  Oropharynx: oropharynx clear of all foreign objects    QCDR Measures:  ASA# 20 - Surgical Safety Checklist: WHO surgical safety checklist completed prior to induction    PQRS# 430 - Adult PONV Prevention: 4558F - Pt received => 2 anti-emetic agents (different classes) preop & intraop  ASA# 8 - Peds PONV Prevention: NA - Not pediatric patient, not GA or 2 or more risk factors NOT present  PQRS# 424 - [place]-op Temp Management: 4559F - At least one body temp DOCUMENTED => 35.5C or 95.9F within required timeframe  PQRS# 426 - PACU Transfer Protocol: - Transfer of care checklist used  ASA# 14 - Acute Post-op Pain: ASA14B - Patient did NOT experience pain >= 7 out of 10

## 2021-06-04 VITALS
HEIGHT: 70 IN | WEIGHT: 290 LBS | TEMPERATURE: 98.5 F | SYSTOLIC BLOOD PRESSURE: 110 MMHG | BODY MASS INDEX: 41.52 KG/M2 | HEART RATE: 76 BPM | DIASTOLIC BLOOD PRESSURE: 78 MMHG

## 2021-06-04 VITALS
HEIGHT: 70 IN | BODY MASS INDEX: 38.51 KG/M2 | DIASTOLIC BLOOD PRESSURE: 74 MMHG | HEART RATE: 92 BPM | WEIGHT: 269 LBS | SYSTOLIC BLOOD PRESSURE: 108 MMHG

## 2021-06-04 VITALS — WEIGHT: 269 LBS | BODY MASS INDEX: 38.51 KG/M2 | HEIGHT: 70 IN

## 2021-06-05 VITALS — WEIGHT: 252 LBS | HEIGHT: 70 IN | BODY MASS INDEX: 36.08 KG/M2

## 2021-06-05 VITALS
SYSTOLIC BLOOD PRESSURE: 106 MMHG | BODY MASS INDEX: 36.08 KG/M2 | HEART RATE: 72 BPM | WEIGHT: 252 LBS | HEIGHT: 70 IN | DIASTOLIC BLOOD PRESSURE: 68 MMHG | OXYGEN SATURATION: 97 %

## 2021-06-05 VITALS
WEIGHT: 270 LBS | DIASTOLIC BLOOD PRESSURE: 76 MMHG | BODY MASS INDEX: 38.65 KG/M2 | SYSTOLIC BLOOD PRESSURE: 106 MMHG | HEIGHT: 70 IN

## 2021-06-09 NOTE — PATIENT INSTRUCTIONS - HE
You might find an chiropractor who specializes in pregnancy related concerns and call ahead to get a feel for experience and what to expect from that provider. There are a few in the [place] especially noted for their work with pregnant women.  1.)       (BZ)  [facility] [place]  [address], [address]  [place] [address]  [phone]  [email or web address]     2.)       (AO)  Bettina Her   [facility]  [facility]  [phone]  [email or web address]  (Very gentle and breastfeeding friendly)       3.)       (Multiple CNMs)  Esise Foster  [facility]  [address], [address]  [place], [place] [id number] [phone]  [email or web address]     4.)       (Multiple CNMs)  Aruna Vargas [facility]   [address], [address]   [place], [place] [id number] [phone]  [email or web address]://[email or web address]"[email or web address].[email or web address]   ([place] certified)          5.)    [facility]  Sheryl Vigil DC  [address]  [place] [address]  [phone]  [email or web address]://[email or web address]      6.)      (BZ)  Ashley Trimble  [facility]  [address], [address]  [place] [address]  [phone]  [email or web address]     7.)        (IGOR)  Lizzie Crowe  [facility]  [address], [address]  [place], [place] [address] [phone]  [email or web address]

## 2021-06-09 NOTE — TELEPHONE ENCOUNTER
TC:  Late entry: Attempted to call patient 7/11/2020, went to , [facility] to call back [phone] to discuss concerns and desire for further testing.  Did not put in telephone note, on call and meant to place when patient returned call.    Elva MCCURDY, MARCO, CLC  7/16/2020 4:51 PM

## 2021-06-09 NOTE — PROGRESS NOTES
"Problem Visit    06/26/20     Subjective:    Georgette Simons is a 35 y.o. female who presents for evaluation of chronic motion sickness but increasing symptoms and recent onset of fatigue and body aches and chills started approximately 1-2 weeks. Notes generally does not get fevers even with significant illness in the past. No fever present with this onset however today is elevated for her normal temperature. States her normal body temp is low and sometimes as low as 94F. Had chills last night, reports no night sweats. Does have some events of chills normally but never associated with the body aches she is feeling this week. No recent illness. 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  Feels this is vertigo but accompanying symptoms are different than has experienced in the past. Normally vertigo is resolved with dramamine as needed, this is not effective at this time.  General stressors: reports none other than work  Diet: no changes, well hydrated  Sleep: reports no changes, has a 9 month old      ROS:   General: as above  Skin: Denies new rashes or lesions.  HEENT: Denies headache, throat swelling or difficulty swallowing.  CV: Denies chest pain, palpitations or shortness of breath.  GI: Denies N/V/D, blood in stool or constipation.  : Denies dysuria or polyuria.  Genital: Denies discharge.  MSK: Denies joint pain, muscles aches or difficulty ambulating.  Neurologic: Denies difficulty   Endocrine: Denies hot/cold intolerance, sweating, polyuria.  Psychiatric: Denies depression or anxiety.      Objective:    /78 (Patient Site: Right Arm, Patient Position: Sitting, Cuff Size: Adult Large)   Pulse 76   Temp 98.5  F (36.9  C) (Oral)   Ht 5' 10\" (1.778 m)   Wt (!) 290 lb (131.5 kg)   LMP 06/26/2020   Breastfeeding No   BMI 41.61 kg/m      Physical Exam:  General Appearance: Alert, cooperative, " no distress, appears stated age  Skin: Skin color, texture, turgor normal, no rashes or lesions.  Throat: Lips, mucosa, and tongue normal; teeth and gums normal  HEENT: grossly normal; otoscopic and opthalmic exafm not performed.   Neck: Supple, symmetrical, trachea midline, no adenopathy. Thyroid not enlarged.  Respiratory: Normal respiratory effort, respirations clear bilateral anterior and posterior.  Cardiovascular: No peripheral edema. Normal rate and rhythm, no murmurs auscultated.   Musculoskeletal: No digital cyanosis. Normal gait and station. Moves all limbs freely. Unable to turn head side to side or sit up without onset of increased vertigo.  Neuro: Cranial nerves II-XII grossly intact.  Psych: Intact judgment and insight. OX3 and conversational.      Assessment/Plan:  Chronic vertigo  -continue dramamine as needed, consider chiropractic or PT based maintenance therapies and given recommendations. Advised to call insurance for coverage.  Acute onset fatigue, elevated temperature, chills  -due to healthcare worker status and exposure to COVID-19 positive patients  -Labs: BMP, HM1, and thyroid labs; advised will call as results return.    Total time with patient 40 minutes with >50% on education, counseling and coordination of care.    Mary Beth Landry, DNP, APRN, CNM

## 2021-06-09 NOTE — TELEPHONE ENCOUNTER
Pls see earlier communication, pt was at work, has not heard back since then, pls call pt re vit B12 level.

## 2021-06-13 NOTE — PROGRESS NOTES
Assessment:      Healthy female exam.    IUD check    Spotting and Cramping   BMI 38.74  Plan:      Ordered ultrasound to confirm location of IUD due to patient having a new onset of  spotting and cramping over the last two months. Speculum exam- not able to visualize stings and not able to feel strings with bimanual exam.    Follow up with Dr. Abad if IUD is not in the proper location.   BMI 38.74- discussed weight baring exercises and walking with patient. She started the Keto diet and has reported weight loss with that provided encouragement for continued weight loss.   Subjective:      Georgette Simons is a 35 y.o. female who presents for an annual exam. The patient is sexually active. The patient participates in regular exercise: yes. The patient reports that there is not domestic violence in her life. She reports noticing bleeding and cramping over the last two months and that she does not check her strings but is worried that her IUD is not working. She started the Keto diet in October and reports losing 30 lbs since starting the diet.      Healthy Habits:   Regular Exercise: Yes  Sunscreen Use: Yes  Healthy Diet: Yes Keto   Dental Visits Regularly: Yes  Seat Belt: Yes  Sexually active: Yes  Self Breast Exam Monthly:No  Lipid Profile: No    Immunization History   Administered Date(s) Administered     DT (pediatric) 1999     INFLUENZA,SEASONAL QUAD, PF, =/> 6months 10/03/2019     Influenza,seasonal, Inj IIV3 10/01/2015     Meningococcal MPSV4, SQ 2002     Meningococcal,Historic,Unknown serogroups 2002     Td, Adult, Absorbed 1999     Td,adult,historic,unspecified 1999     Tdap 2011, 2019     Immunization status: up to date and documented.    Gynecologic History  Patient's last menstrual period was 2020.  Contraception: IUD  Last Pap: 3/27/2018. Results were: normal    OB History    Para Term  AB Living   2 1 1     1   SAB TAB Ectopic Multiple Live  Births           1      # Outcome Date GA Lbr Edward/2nd Weight Sex Delivery Anes PTL Lv   2 Current            1 Term 19 39w0d  8 lb 8 oz (3.856 kg) M CS-LTranv Spinal N MATI      Birth Comments: breech at term       Current Outpatient Medications   Medication Sig Dispense Refill     dimenhyDRINATE (DRAMAMINE) 50 MG tablet Take 50 mg by mouth at bedtime as needed.       ibuprofen (ADVIL,MOTRIN) 200 MG tablet Take 600 mg by mouth every 6 (six) hours as needed for pain.       levonorgestrel (MIRENA) 20 mcg/24 hours (5 yrs) 52 mg IUD 1 each by Intrauterine route once. Mirena IUD placed on 19.  Due for removal/replacement by 24.  Lot: [id number]  Exp: 2021       No current facility-administered medications for this visit.      Past Medical History:   Diagnosis Date     Genital HSV      History of varicella as a child      Kidney stone      PONV (postoperative nausea and vomiting)      Past Surgical History:   Procedure Laterality Date      SECTION Bilateral 2019    Procedure: PRIMARY  SECTION FOR BREECH PRESENTATION;  Surgeon: Bethanie Rolon MD;  Location: [facility] OR;  Service: Obstetrics     LEG SKIN LESION  BIOPSY / EXCISION Right 2019    Procedure: EXCISION SKIN LESION RIGHT FOOT;  Surgeon: Trace Benson DPM;  Location: [facility] OR;  Service: Podiatry     REDUCTION MAMMAPLASTY       TONSILLECTOMY  2006     Penicillins  Family History   Problem Relation Age of Onset     Thyroid disease Mother      No Medical Problems Father      Hypertension Maternal Grandmother      Diabetes Maternal Grandfather      Breast cancer Maternal Aunt      Social History     Socioeconomic History     Marital status:      Spouse name: Abhi     Number of children: 1     Years of education: Not on file     Highest education level: Not on file   Occupational History     Occupation: RN     Employer: [employer]   Social Needs     Financial resource strain: Not on file      Food insecurity     Worry: Not on file     Inability: Not on file     Transportation needs     Medical: Not on file     Non-medical: Not on file   Tobacco Use     Smoking status: Never Smoker     Smokeless tobacco: Never Used   Substance and Sexual Activity     Alcohol use: Not Currently     Drug use: Never     Sexual activity: Yes     Partners: Male   Lifestyle     Physical activity     Days per week: Not on file     Minutes per session: Not on file     Stress: Not on file   Relationships     Social connections     Talks on phone: Not on file     Gets together: Not on file     Attends Roman Catholic service: Not on file     Active member of club or organization: Not on file     Attends meetings of clubs or organizations: Not on file     Relationship status: Not on file     Intimate partner violence     Fear of current or ex partner: Not on file     Emotionally abused: Not on file     Physically abused: Not on file     Forced sexual activity: Not on file   Other Topics Concern     Not on file   Social History Narrative     Not on file       Review of Systems  General:  Denies problems, Eyes:  Denies problems, Ears/Nose/Throat:  Denies problems, Cardiovascular:  Denies problems, Respiratory:  Denies problems, Gastrointestinal:  Denies problems, Genitourinary:  Denies problems, Musculoskeletal:  Denies problems, Skin:  Denies problems, Neurologic:  Denies problems, Psychiatric:  Denies problems, Endocrine:  Denies problems, Heme/Lymphatic:  Denies problems and Allergic/Immunologic:  Denies problems       Objective:      /76 P 76 T98.5 O2 96% RA    Physical Exam:  General Appearance: Alert, cooperative, no distress, appears stated age, Head: Normocephalic, without obvious abnormality, atraumatic, Eyes: PERRL, conjunctiva/corneas clear, EOM's intact, Ears: Normal TM's and external ear canals, both ears, Nose:Nares normal, septum midline,mucosa normal, no drainage , Throat:Lips, mucosa, and tongue normal; teeth and  gums normal, Neck: Supple, symmetrical, trachea midline, no adenopathy;  thyroid: not enlarged, symmetric, no tenderness/mass/nodules; no carotid bruit or JVD, Back: Symmetric, no curvature, ROM normal, no CVA tenderness, Lungs: Clear to auscultation bilaterally, respirations unlabored, Breasts: No breast masses, tenderness, asymmetry, or nipple discharge., Heart: Regular rate and rhythm, S1 and S2 normal, no murmur, rub, or gallop, Abdomen: Soft, non-tender, bowel sounds active all four quadrants,  no masses, no organomegaly, Pelvic:Not able to visualize IUD strings due to cervical position , Extremities: Extremities normal, atraumatic, no cyanosis or edema, Skin: Skin color, texture, turgor normal, no rashes or lesions, Lymph nodes: Cervical, supraclavicular, and axillary nodes normal and Neurologic: Normal    Patient was seen with student Clemente WORKMAN who was present for learning.  I personally assessed, examined and made clinical decisions reflected in the documentation.     Esthela Verma DNP, APRN,CNM

## 2021-06-14 NOTE — TELEPHONE ENCOUNTER
Spoke with patient who states that she is feeling okay at this time.  She has enough medication and understands that protocol medications.  She will call as needed with any issues or questions.  She was updated with this weeks clinic hours.  Leila Vega RN

## 2021-06-14 NOTE — PROGRESS NOTES
Proposed Surgery/ Procedure: Kidney Stone Removal  Date of Surgery/ Procedure: 1/22/2021  Time of Surgery/ Procedure: 8:00am  Hospital/Surgical Facility: [facility]  Surgery Fax Number: [phone]  Primary Physician: Provider, No Primary Care  Type of Anesthesia Anticipated: General    HPI related to upcoming procedure: NONE    Have you ever had a heart attack or stroke? No  Have you ever had surgery on your heart or blood vessels, such as a stent, coronary (heart) bypass, or surgery on an artery in the head, neck, heart, or legs? No  Do you have chest pain when you are physically active? No  Do you have a history of heart failure? No  Do you currently have a cold, bronchitis, or symptoms of other respiratory (head and chest) infections? No  Do you have a cough, shortness of breath, or wheezing? No  Do you or anyone in your family have a history of blood clots? No  Do you or anyone in your family have a serious bleeding problem, such as long-lasting bleeding after surgeries or cuts? No  Have you ever had anemia or been told to take iron pills? YES: Took iron pills during pregnancy, one year ago  Have you had any abnormal blood loss such as black, tarry or bloody stools, or abnormal vaginal bleeding?No  Have you ever had a blood transfusion? No   Are you willing to have a blood transfusion if it is medically needed before, during, or after your surgery? Yes  Have you or anyone in your family ever had problems with anesthesia (sedation for surgery)? YES: personal history of postoperative nausea.  Do you have sleep apnea, excessive snoring, or daytime drowsiness? No  Do you have any artifical heart valves or other implanted medical devices, such as a pacemaker, defibrillator, or continuous glucose monitor? No  Do you have any artifical joints? No  Are you allergic to latex? No  Is there any chance that you may be pregnant? No    Patient has a Health Care Directive or Living Will:  NO     Assessment:    1.  Preoperative physical  2.  Nephrolithiasis  3.  BMI 36  4.  Contraceptive management     Plan:      1.  Past medical, surgical, family, socioeconomic and obstetric history is reviewed in their entirety.    2. Blood tests: Hemoglobin.  Urine pregnancy test today.  3.  Preoperative physical exam completed.  4. Contraception: Mirena IUD  5.  Next pap due 2022. HPV co-testing discussed with that pap, then paps q 5 yrs if both negative.  6.  RTC 1 year for annual physical exam, PRN    Total time spent with patient: 40 minutes greater than 50% of which was spent counseling and coordinating care    Subjective:      Georgette Simons is a 35 y.o. female who presents for a preoperative physical exam.  Reports nephrolithiasis with scheduled 2021, arrival time 8 AM.    Uses Mirena IUD for contraception.    Immunization History   Administered Date(s) Administered     DT (pediatric) 1999     INFLUENZA,SEASONAL QUAD, PF, =/> 6months 10/03/2019     Influenza, inj, historic,unspecified 2020     Influenza,seasonal, Inj IIV3 10/01/2015     Meningococcal MPSV4, SQ 2002     Meningococcal,Historic,Unknown serogroups 2002     Td, Adult, Absorbed 1999     Td,adult,historic,unspecified 1999     Tdap 2011, 2019     Gynecologic History  Patient's last menstrual period was 2021.  Contraception: IUD    Cancer screening:   Last Pap: 3/27/2018. Results were: normal    OB History    Para Term  AB Living   2 1 1     1   SAB TAB Ectopic Multiple Live Births           1      # Outcome Date GA Lbr Edward/2nd Weight Sex Delivery Anes PTL Lv   2             1 Term 19 39w0d  8 lb 8 oz (3.856 kg) M CS-LTranv Spinal N MATI      Birth Comments: breech at term       Current Outpatient Medications   Medication Sig Dispense Refill     levonorgestrel (MIRENA) 20 mcg/24 hours (5 yrs) 52 mg IUD 1 each by Intrauterine route once. Mirena IUD placed on 19.  Due for  removal/replacement by 24.  Lot: [id number]  Exp: 2021       oxyCODONE (ROXICODONE) 5 MG immediate release tablet Take 1-2 tablets (5-10 mg total) by mouth every 4 (four) hours as needed for pain. 12 tablet 0     tamsulosin (FLOMAX) 0.4 mg cap Take 1 capsule (0.4 mg total) by mouth daily for 14 days. With meal 14 capsule 1     tolterodine (DETROL) 2 MG tablet Take 1 tablet (2 mg total) by mouth 2 (two) times a day for 14 days. Start postoperatively 14 tablet 1     No current facility-administered medications for this visit.      Past Medical History:   Diagnosis Date     Genital HSV      History of varicella as a child      Kidney stone      PONV (postoperative nausea and vomiting)      Past Surgical History:   Procedure Laterality Date      SECTION Bilateral 2019    Procedure: PRIMARY  SECTION FOR BREECH PRESENTATION;  Surgeon: Bethanie Rolon MD;  Location: [facility];  Service: Obstetrics     LEG SKIN LESION  BIOPSY / EXCISION Right 2019    Procedure: EXCISION SKIN LESION RIGHT FOOT;  Surgeon: Trace Benson DPM;  Location: [facility] OR;  Service: Podiatry     REDUCTION MAMMAPLASTY       TONSILLECTOMY  2006     Penicillins  Family History   Problem Relation Age of Onset     Thyroid disease Mother         hypothyroidism     Breast cancer Mother 48     Hypertension Father      Hypertension Maternal Grandmother      Diabetes Maternal Grandfather      Breast cancer Maternal Aunt      Hypertension Brother      No Medical Problems Brother      No Medical Problems Son      Social History     Socioeconomic History     Marital status:      Spouse name: Abhi     Number of children: 1     Years of education: Not on file     Highest education level: Not on file   Occupational History     Occupation: RN     Employer: [employer]   Social Needs     Financial resource strain: Not on file     Food insecurity     Worry: Not on file     Inability: Not on file      "Transportation needs     Medical: Not on file     Non-medical: Not on file   Tobacco Use     Smoking status: Never Smoker     Smokeless tobacco: Never Used   Substance and Sexual Activity     Alcohol use: Yes     Frequency: 2-3 times a week     Drinks per session: 1 or 2     Binge frequency: Never     Drug use: Never     Sexual activity: Yes     Partners: Male     Birth control/protection: I.U.D.   Lifestyle     Physical activity     Days per week: Not on file     Minutes per session: Not on file     Stress: Not on file   Relationships     Social connections     Talks on phone: Not on file     Gets together: Not on file     Attends Shinto service: Not on file     Active member of club or organization: Not on file     Attends meetings of clubs or organizations: Not on file     Relationship status: Not on file     Intimate partner violence     Fear of current or ex partner: Not on file     Emotionally abused: Not on file     Physically abused: Not on file     Forced sexual activity: Not on file   Other Topics Concern     Not on file   Social History Narrative     Not on file       Objective:         Vitals:    01/18/21 0745   BP: 106/68   Pulse: 72   SpO2: 97%   Weight: (!) 252 lb (114.3 kg)   Height: 5' 10\" (1.778 m)       Physical Exam:  Constitutional: Patient is oriented to person, place, and time. Patient appears well-developed and well-nourished. No distress.   Head: Normocephalic and atraumatic.   Right Ear: External ear normal.   Left Ear: External ear normal.   Nose: Nose normal.   Mouth/Throat: Oropharynx is clear and moist. No oropharyngeal exudate.   Eyes: Conjunctivae and EOM are normal. Pupils are equal, round, and reactive to light. Right eye exhibits no discharge. Left eye exhibits no discharge. No scleral icterus.   Neck: Neck supple. No JVD present. No tracheal deviation present. No thyromegaly present.   Breasts: Deferred  Cardiovascular: Normal rate, regular rhythm, normal heart sounds. No " murmur heard.   Pulmonary/Chest: Effort normal and breath sounds normal. No stridor. No respiratory distress. Patient has no wheezes, no rales, exhibits no tenderness.   Abdominal: Soft. Bowel sounds are normal. Patient exhibits no distension and no mass. There is no tenderness. There is no rebound and no guarding.   Lymphadenopathy:  Patient has no cervical adenopathy.   Neurological: Patient is alert and oriented to person, place, and time. Patient has normal reflexes. No cranial nerve deficit. Coordination normal.   Skin: Skin is warm and dry. No rash noted. Patient is not diaphoretic. No erythema. No pallor.   Pelvic: Deferred  Psychiatric: Patient has good eye contact without any psychomotor retardation or stereotypic behaviors.  normal mood and affect. Judgment and thought content normal.   Speech is regular rate and rhythm.     Hemoglobin 14.2 g/dL  Urine pregnancy test: NEGATIVE

## 2021-06-14 NOTE — PROGRESS NOTES
Spoke with patient who states that she was able to remove her stent without problem, however she is currently having '10' out of 10 pain.  Possible it is a fragment and/or post stent removal pain.  Refill request for pain medication sent to provider.  Will f/u with patient.  Leila Vega RN    Spoke with patient and she is feeling better after taking protocol medications and oxycodone.  She will call with any other issues.  Leila Vega RN

## 2021-06-14 NOTE — PATIENT INSTRUCTIONS - HE
Patient Stated Goal: Pass my stone  Symptom Control While Passing A Stone    The goal of [facility] is to let a smaller kidney stone (less than 4 to 5 mm) pass without intervention if possible. Giving your body a chance to clear the stone may take a few hours up to a few weeks.  Keeping you well-informed, safe and fairly comfortable is important.    Drink to thirst  Do not attempt to  flush out  your stone by drinking too much fluid. This does not work and may increase nausea. Drink enough to satisfy your body s thirst. Eating your normal diet is fine.   Medications (that may be suggested or prescribed)    Ibuprofen (Advil or Motrin) Available over the counter  o Take two (200mg) tablets every six hours until the stone passes.  o Prevents spasm of the ureter.    o Decreases pain.      Dramamine* (drowsy version, non-generic formulation) Available over the counter  o Take 50mg at bedtime  o Decreases spasms of the ureter  o Decreases nausea  o Decreases acute pain  o Decreases recurrence of pain for 24 hours  o Will help you sleep  *This medication will cause increase drowsiness, do not drive or operate machinery for 6 hours.      Narcotics (Percocet, Vicodin, Dilaudid) Take as prescribed for severe pain unrelieved by ibuprofen and Dramamine  o Narcotics have significant side effects and only  cover-up  pain. They have no effect on the cause of pain.  o Common side effects  - Confusion, disorientation and sedation - DO NOT DRIVE OR OPERATE MACHINERY WITHIN 24 HOURS  - Nausea - take Dramamine or Zofran or Haldol to help control  - Constipation  - Sleep disturbances      Ondansetron (Zofran) Take as prescribed  o Reserve for severe nausea  o May cause constipation, start over the counter Miralax if needed      Second Line Anti-Nausea Medication: Adding a different anti-nausea medication maybe helpful for persistent nausea.  The combined effect of different types of anti-nausea medications maybe more  effective than either medication by itself, even in higher doses.  o Compazine: Take as prescribed      Information about kidney stones    Crystals can form if chemicals are too concentrated in your urine. If the crystal grows over time, a stone may form. A stone usually isn t painful while it is still in the kidney.    As the stone begins to leave the kidney, you may experience episodes of flank pain as the kidney stone approaches the entrance to the ureter. Some people feel a vague ache in the side.    Kidney stones may fall into the ureter. Some stones are tiny and pass through without causing symptoms. The ureter is a small tube (approximately 1/8 of an inch wide). A kidney stone can get stuck and block the ureter. If this happens, urine backs up and flows back to the kidney. Back pressure on the kidney can cause:  o Severe flank pain radiating to the groin.  o Severe nausea and vomiting.  o The pain can occur in the lower back, side, groin or all three.      When the stone reaches the lower ureter, this can irritate the bladder and sensations of feeling the urge to urinate frequently and urgently may occur.      Once the stone passes out of your ureter and into your bladder, the symptoms of urgency and frequency will often disappear. Sometimes pain will come back for a short period and will not be as severe as before. The passage of the stone from your bladder and out of your body is usually not a problem. The urethra is at least twice as wide as the normal ureter, so the stone doesn t usually block it.    Strain all urine  If you pass the stone, save it. Place it in the container we have provided and bring it to the [facility] within a week of passing it. Your stone will then be sent for analysis which takes about a month.     Signs and symptoms you might experience    Nausea    Decreased appetite    Urinary frequency    Bloody urine     Chills    Fatigue    When to call [facility] or  go to the Emergency Room    Fever with a temperature greater than 100.1    Severe pain    Persistent nausea/vomiting    If the pain worsens or nausea/vomiting is uncontrolled with medications, STOP eating & drinking. You need to have an empty stomach for 8 hours prior to surgery. Call the [facility] immediately at [phone].           Follow-up    Low dose CT scan with doctor visit 1-2 weeks after initial clinic visit per doctor s instructions    Please cancel the CT scan visit if you pass a stone. Reschedule for a one month follow-up with doctor to discuss stone composition and future prevention.    Preventing future stones    Approximately a month after your stone is sent out for analysis, a prevention visit will occur with your provider, to discuss an individualized plan for prevention of new stones and to discuss managing stones that you may still have. Along with the analysis of the kidney stone, blood and urine tests may be indicated to develop this plan. Knowing the type of kidney stones you make, and why, allows the providers at the [facility] to recommend specific ways to prevent them.    Follow-up visits are an important part of monitoring and preventing future re-occurrences.    The [facility] is available for questions or concerns 24 hours a day at [phone]

## 2021-06-14 NOTE — ANESTHESIA PREPROCEDURE EVALUATION
Anesthesia Evaluation      Patient summary reviewed   History of anesthetic complications (ponv)     Airway   Mallampati: II  Neck ROM: full   Pulmonary - negative ROS and normal exam                          Cardiovascular - negative ROS and normal exam  Exercise tolerance: > or = 4 METS   Neuro/Psych - negative ROS     Endo/Other - negative ROS   (+) obesity (bmi 36),      GI/Hepatic/Renal - negative ROS   (+)   chronic renal disease (nephrolithiasis),           Dental - normal exam                        Anesthesia Plan  Planned anesthetic: general LMA  Versed/fentanyl/propofol  Propofol infusion  Ketamine PRN  Decadron/zofran/scop      ASA 2   Induction: intravenous   Anesthetic plan and risks discussed with: patient  Anesthesia plan special considerations: antiemetics,   Post-op plan: routine recovery      1/19/21 covid pcr negative    Results for orders placed or performed during the hospital encounter of 01/22/21   POCT Pregnancy (Beta-hCG, Qual), Urine (Point of Care) on DOS   Result Value Ref Range    POC Preg, Urine Negative Negative    POCT Kit Lot Number [id number]     POCT Kit Expiration Date 202,210     Pos Control Valid Control Valid Control    Neg Control Valid Control Valid Control    Dipstick Lot Number      Dipstick Expiration Date      POC Specific Gravity, Urine         Chemistry        Component Value Date/Time     12/24/2020 1150    K 4.1 12/24/2020 1150     12/24/2020 1150    CO2 23 12/24/2020 1150    BUN 18 12/24/2020 1150    CREATININE 0.82 12/24/2020 1150    GLU 89 12/24/2020 1150        Component Value Date/Time    CALCIUM 10.0 12/24/2020 1150    ALKPHOS 73 12/24/2020 1150    AST 20 12/24/2020 1150    ALT 35 12/24/2020 1150    BILITOT 0.8 12/24/2020 1150        Lab Results   Component Value Date    WBC 7.5 12/24/2020    HGB 14.2 01/18/2021    HCT 41.0 12/24/2020    MCV 90 12/24/2020     12/24/2020

## 2021-06-14 NOTE — ANESTHESIA POSTPROCEDURE EVALUATION
Patient: Georgette Simons  Procedure(s):  CYSTOURETEROSCOPY, WITH RETROGRADE PYELOGRAM, HOLMIUM LASER LITHOTRIPSY OF URETERAL CALCULUS, AND STENT INSERTION RIGHT (Right)  Anesthesia type: general    Patient location: Phase II Recovery  Last vitals:   Vitals Value Taken Time   /65 01/22/21 1130   Temp 36.2  C (97.2  F) 01/22/21 1112   Pulse 84 01/22/21 1141   Resp 16 01/22/21 1130   SpO2 96 % 01/22/21 1141   Vitals shown include unvalidated device data.  Post vital signs: stable  Level of consciousness: awake and responds to simple questions  Post-anesthesia pain: pain controlled  Post-anesthesia nausea and vomiting: no  Pulmonary: unassisted, return to baseline  Cardiovascular: stable and blood pressure at baseline  Hydration: adequate  Anesthetic events: no    QCDR Measures:  ASA# 11 - [place]-op Cardiac Arrest: ASA11B - Patient did NOT experience unanticipated cardiac arrest  ASA# 12 - [place]-op Mortality Rate: ASA12B - Patient did NOT die  ASA# 13 - PACU Re-Intubation Rate: ASA13B - Patient did NOT require a new airway mgmt  ASA# 10 - Composite Anes Safety: ASA10A - No serious adverse event    Additional Notes:

## 2021-06-14 NOTE — PROGRESS NOTES
Assessment/Plan:        Diagnoses and all orders for this visit:    Calculus of ureter  -     tamsulosin (FLOMAX) 0.4 mg cap; Take 1 capsule (0.4 mg total) by mouth daily for 14 days. With meal  Dispense: 14 capsule; Refill: 1  -     tolterodine (DETROL) 2 MG tablet; Take 1 tablet (2 mg total) by mouth 2 (two) times a day for 14 days. Start postoperatively  Dispense: 14 tablet; Refill: 1  -     Verify informed consent; Standing  -     Diet NPO; Standing  -     Place sequential compression device; Standing  -     XR Retrograde Pyelogram W or WO KUB Intraoperative; Standing  -     levoFLOXacin 500 mg in sodium chloride 0.9% 50 mL (LEVAQUIN)  -     ketorolac injection 15 mg (TORADOL)  -     acetaminophen tablet 1,000 mg (TYLENOL)  -     sterile water IR irrigation solution 3,000 mL  -     Patient Stated Goal: Know what to expect after surgery  -     Ureteroscopy Education  -     Case Request: CYSTOURETEROSCOPY, WITH RETROGRADE PYELOGRAM, HOLMIUM LASER LITHOTRIPSY OF URETERAL CALCULUS, AND STENT INSERTION RIGHT; Standing  -     XR Abdomen AP; Standing  -     gabapentin capsule 300 mg (NEURONTIN)  -     Case Request: CYSTOURETEROSCOPY, WITH RETROGRADE PYELOGRAM, HOLMIUM LASER LITHOTRIPSY OF URETERAL CALCULUS, AND STENT INSERTION RIGHT    Hydronephrosis with urinary obstruction due to ureteral calculus      Stone Management Plan  [facility] Stone Management 11/13/2019 12/28/2020 1/12/2021   Urinary Tract Infection No suspicion of infection No suspicion of infection No suspicion of infection   Renal Colic Asymptomatic at this time Well controlled symptoms Well controlled symptoms   Renal Failure No suspicion of renal failure No suspicion of renal failure No suspicion of renal failure   Current CT date - 12/24/2020 1/11/2021   Right sided stones? - Yes Yes   R Number of ureteral stones - 1 2   R GSD of ureteral stones - 7 7   R Location of ureteral stone - Proximal Proximal   R Number of kidney stones  - No renal stones No renal  stones   R GSD of kidney stones - - 4 - 10   R Hydronephrosis - Mild Mild   R Stone Event No current event New event Established event   Diagnosis date - 12/24/2020 -   Initial location of primary symptomatic stone - Mid -   Initial GSD of primary symptomatic stone - 7 -   R MET status - Initiation No progression   R Current Plan Observe MET Clear   MET - 2 week F/U -   Clear rationale - - MET failure   Observe rationale Limited stone burden with good prognosis for spontaneous passage - -   Left sided stones? - No -   L Number of ureteral stones - - -   L Number of kidney stones  - - -   L GSD of kidney stones - - -   L Hydronephrosis - - -   L Stone Event No current event No current event No current event   L Current Plan Observe - -   Observe rationale Limited stone burden with good prognosis for spontaneous passage - -        Phone call duration: 18 minutes    Mary Power PA-C     Subjective:       HPI  Ms. Georgette Simons is a 35 y.o.  female who is being evaluated via a billable telephone visit by [facility] for medical expulsive therapy follow up.     On last encounter, her 7 mm stone was in right proximal ureter with mild hydronephrosis. She has had no unanticipated events.    She has had intermittent right abdominal. She has not seen a stone pass. She is asymptomatic at present. She denies symptoms of fever, chills, flank pain, nausea, vomiting, urinary frequency and dysuria.     New CT scan was personally reviewed and demonstrates no progression of right proximal ureteral stone. Additionally, previous 2 mm right renal stone has migrated and now sitting adjacent to pre-existing ureteral stone. Stable, mild right hydronephrosis.    PLAN    36 yo F with hx of recurrent calcium based kidney stones, previously required surgical stone interventions. Nonprogressing right proximal ureteral stone x 2.    She will proceed to the operating room for ureteroscopic stone  clearance. Risks and benefits were detailed of ureteroscopic stone clearance including potential issues of urinary or systemic infection, ureteral injury, inaccessible stone, incomplete stone clearance, multiple surgeries, and stent related symptoms of urgency, frequency and hematuria. Prescribed both flomax and detrol for stent colic.       ROS   Review of systems is negative except for HPI.    Past Medical History:   Diagnosis Date     Genital HSV      History of varicella as a child      Kidney stone      PONV (postoperative nausea and vomiting)        Past Surgical History:   Procedure Laterality Date      SECTION Bilateral 2019    Procedure: PRIMARY  SECTION FOR BREECH PRESENTATION;  Surgeon: Bethanie Rolon MD;  Location: [facility] L[facility] OR;  Service: Obstetrics     LEG SKIN LESION  BIOPSY / EXCISION Right 2019    Procedure: EXCISION SKIN LESION RIGHT FOOT;  Surgeon: Trace Benson DPM;  Location: [facility] OR;  Service: Podiatry     REDUCTION MAMMAPLASTY       TONSILLECTOMY         Current Outpatient Medications   Medication Sig Dispense Refill     levonorgestrel (MIRENA) 20 mcg/24 hours (5 yrs) 52 mg IUD 1 each by Intrauterine route once. Mirena IUD placed on 19.  Due for removal/replacement by 24.  Lot: [id number]  Exp: 2021       ondansetron (ZOFRAN ODT) 4 MG disintegrating tablet Take 1 tablet (4 mg total) by mouth every 8 (eight) hours as needed. 20 tablet 0     oxyCODONE (ROXICODONE) 5 MG immediate release tablet Take 1-2 tablets (5-10 mg total) by mouth every 4 (four) hours as needed for pain. 12 tablet 0     tamsulosin (FLOMAX) 0.4 mg cap Take 1 capsule (0.4 mg total) by mouth daily for 14 days. With meal 14 capsule 1     tolterodine (DETROL) 2 MG tablet Take 1 tablet (2 mg total) by mouth 2 (two) times a day for 14 days. Start postoperatively 14 tablet 1     No current facility-administered medications for this visit.        Allergies   Allergen  Reactions     Penicillins Rash     rash         Social History     Socioeconomic History     Marital status:      Spouse name: Abhi     Number of children: 1     Years of education: Not on file     Highest education level: Not on file   Occupational History     Occupation: RN     Employer: [employer]   Social Needs     Financial resource strain: Not on file     Food insecurity     Worry: Not on file     Inability: Not on file     Transportation needs     Medical: Not on file     Non-medical: Not on file   Tobacco Use     Smoking status: Never Smoker     Smokeless tobacco: Never Used   Substance and Sexual Activity     Alcohol use: Yes     Frequency: 2-3 times a week     Drinks per session: 1 or 2     Binge frequency: Never     Drug use: Never     Sexual activity: Yes     Partners: Male     Birth control/protection: I.U.D.   Lifestyle     Physical activity     Days per week: Not on file     Minutes per session: Not on file     Stress: Not on file   Relationships     Social connections     Talks on phone: Not on file     Gets together: Not on file     Attends Bahai service: Not on file     Active member of club or organization: Not on file     Attends meetings of clubs or organizations: Not on file     Relationship status: Not on file     Intimate partner violence     Fear of current or ex partner: Not on file     Emotionally abused: Not on file     Physically abused: Not on file     Forced sexual activity: Not on file   Other Topics Concern     Not on file   Social History Narrative     Not on file       Family History   Problem Relation Age of Onset     Thyroid disease Mother      No Medical Problems Father      Hypertension Maternal Grandmother      Diabetes Maternal Grandfather      Breast cancer Maternal Aunt

## 2021-06-14 NOTE — PROGRESS NOTES
Assessment/Plan:        Diagnoses and all orders for this visit:    Calculus of ureter  -     Symptom Control While Passing a Stone Education  -     oxyCODONE (ROXICODONE) 5 MG immediate release tablet; Take 1-2 tablets (5-10 mg total) by mouth every 4 (four) hours as needed for pain.  Dispense: 12 tablet; Refill: 0  -     CT Abdomen Pelvis Without Oral Without IV Contrast; Future; Expected date: 01/11/2021  -     Patient Stated Goal: Pass my stone      Stone Management Plan  [facility] 10/22/2019 11/13/2019 12/28/2020   Urinary Tract Infection No suspicion of infection No suspicion of infection No suspicion of infection   Renal Colic Asymptomatic at this time Asymptomatic at this time Well controlled symptoms   Renal Failure No suspicion of renal failure No suspicion of renal failure No suspicion of renal failure   Current CT date 10/22/2019 - 12/24/2020   Right sided stones? Yes - Yes   R Number of ureteral stones No ureteral stones - 1   R GSD of ureteral stones - - 7   R Location of ureteral stone - - Proximal   R Number of kidney stones  2 - No renal stones   R GSD of kidney stones 4 - 10 - -   R Hydronephrosis None - Mild   R Stone Event No current event No current event New event   Diagnosis date - - 12/24/2020   Initial location of primary symptomatic stone - - Mid   Initial GSD of primary symptomatic stone - - 7   R MET status - - Initiation   R Current Plan Observe Observe MET   MET - - 2 week F/U   Observe rationale Limited stone burden with good prognosis for spontaneous passage Limited stone burden with good prognosis for spontaneous passage -   Left sided stones? Yes - No   L Number of ureteral stones No ureteral stones - -   L Number of kidney stones  1 - -   L GSD of kidney stones 2 - 4 - -   L Hydronephrosis None - -   L Stone Event No current event No current event No current event   L Current Plan Observe Observe -   Observe rationale Limited stone burden with good prognosis for  "spontaneous passage Limited stone burden with good prognosis for spontaneous passage -             Phone call duration: 23 minutes    Chai Lewis MD     Subjective:      The patient has been notified of following:     \"This telephone visit will be conducted via a call between you and your physician/provider. We have found that certain health care needs can be provided without the need for a physical exam.  This service lets us provide the care you need with a short phone conversation.  If a prescription is necessary we can send it directly to your pharmacy.  If labs and/or imaging are needed, we can place orders so you can have the test (s) done at a later time.    If during the course of the call the physician/provider feels a telephone visit is not appropriate, you will not be charged for this service.\"     HPI  Ms. Georgette Simons is a 35 y.o.  female who is being evaluated via a billable telephone visit by [facility] [place] for unanticipated visit with acute exacerbation of chronic stone disease.      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 She has previously participated in stone risk evaluation and remains adherent to recommendations. She has identified modifiable stone risks including:  low urine volume and dietary hyperoxaluria. She has no identified non-modifiable stone risk factors.    Presented to ED with acute onset, severe, right flank pain. No fever or chills. Pain control was adequate until she self restricted NSAIDs in anticipation of future surgery. She is a hospital based RN. She has successfully passed 5 mm stones. Thinks she passes small stones relatively frequently.    CT scan is personally reviewed and demonstrates a 7 mm right proximal ureteral stone with mild hydronephrosis.    Significant labs from presentation are summarized below    Will proceed with medical expulsive " therapy. Risks and benefits were detailed of medical expulsive therapy including probability of stone passage, recurrent renal colic, and requirement of emergency medical and/or surgical care and further imaging. Patient verbalized understanding. Patient agrees with plan as discussed. She will return in 2 weeks with low dose CT scan.    Will do a phone check in 1-2 days. She is aware that the stone is at the upper limit for what she has passed but is willing to try as long as symptoms are under control.     For symptom control, she was prescribed oxycodone. Over the counter symptom control medications of ibuprofen, Dramamine and dramamine were recommended.       ROS   Review of systems is negative except for HPI.    Past Medical History:   Diagnosis Date     Genital HSV      History of varicella as a child      Kidney stone      PONV (postoperative nausea and vomiting)        Past Surgical History:   Procedure Laterality Date      SECTION Bilateral 2019    Procedure: PRIMARY  SECTION FOR BREECH PRESENTATION;  Surgeon: Bethanie Rolon MD;  Location: [facility] OR;  Service: Obstetrics     LEG SKIN LESION  BIOPSY / EXCISION Right 2019    Procedure: EXCISION SKIN LESION RIGHT FOOT;  Surgeon: Trace Benson DPM;  Location: [facility] OR;  Service: Podiatry     REDUCTION MAMMAPLASTY       TONSILLECTOMY  2006       Current Outpatient Medications   Medication Sig Dispense Refill     dimenhyDRINATE (DRAMAMINE) 50 MG tablet Take 1 tablet (50 mg total) by mouth at bedtime for 7 days. 7 tablet 0     levonorgestrel (MIRENA) 20 mcg/24 hours (5 yrs) 52 mg IUD 1 each by Intrauterine route once. Mirena IUD placed on 19.  Due for removal/replacement by 24.  Lot: [id number]  Exp: 2021       ondansetron (ZOFRAN ODT) 4 MG disintegrating tablet Take 1 tablet (4 mg total) by mouth every 8 (eight) hours as needed. 20 tablet 0     oxyCODONE (ROXICODONE) 5 MG immediate release tablet Every  4-6 hours as needed if pain is not improved with acetaminophen and ibuprofen. 12 tablet 0     oxyCODONE (ROXICODONE) 5 MG immediate release tablet Take 1-2 tablets (5-10 mg total) by mouth every 4 (four) hours as needed for pain. 12 tablet 0     No current facility-administered medications for this visit.        Allergies   Allergen Reactions     Penicillins Rash     rash         Social History     Socioeconomic History     Marital status:      Spouse name: Abhi     Number of children: 1     Years of education: Not on file     Highest education level: Not on file   Occupational History     Occupation: RN     Employer: [employer]   Social Needs     Financial resource strain: Not on file     Food insecurity     Worry: Not on file     Inability: Not on file     Transportation needs     Medical: Not on file     Non-medical: Not on file   Tobacco Use     Smoking status: Never Smoker     Smokeless tobacco: Never Used   Substance and Sexual Activity     Alcohol use: Yes     Frequency: 2-3 times a week     Drinks per session: 1 or 2     Binge frequency: Never     Drug use: Never     Sexual activity: Yes     Partners: Male     Birth control/protection: I.U.D.   Lifestyle     Physical activity     Days per week: Not on file     Minutes per session: Not on file     Stress: Not on file   Relationships     Social connections     Talks on phone: Not on file     Gets together: Not on file     Attends Synagogue service: Not on file     Active member of club or organization: Not on file     Attends meetings of clubs or organizations: Not on file     Relationship status: Not on file     Intimate partner violence     Fear of current or ex partner: Not on file     Emotionally abused: Not on file     Physically abused: Not on file     Forced sexual activity: Not on file   Other Topics Concern     Not on file   Social History Narrative     Not on file       Family History   Problem Relation Age of Onset     Thyroid  disease Mother      No Medical Problems Father      Hypertension Maternal Grandmother      Diabetes Maternal Grandfather      Breast cancer Maternal Aunt        Objective:      Labs   Urinalysis POC (Office):  Nitrite, UA   Date Value Ref Range Status   12/24/2020 Negative Negative Final   11/13/2019 Negative Negative Final   10/22/2019 Negative Negative Final       Lab Urinalysis:  Blood, UA   Date Value Ref Range Status   12/24/2020 Large (!) Negative Final   11/13/2019 Large (!) Negative Final   10/22/2019 Large (!) Negative Final     Nitrite, UA   Date Value Ref Range Status   12/24/2020 Negative Negative Final   11/13/2019 Negative Negative Final   10/22/2019 Negative Negative Final     Leukocytes, UA   Date Value Ref Range Status   12/24/2020 Negative Negative Final   11/13/2019 Trace (!) Negative Final   10/22/2019 Trace (!) Negative Final     pH, UA   Date Value Ref Range Status   12/24/2020 6.0 4.5 - 8.0 Final   11/13/2019 7.0 5.0 - 8.0 Final   10/22/2019 6.5 5.0 - 8.0 Final    and Acute Labs   CBC   WBC   Date Value Ref Range Status   12/24/2020 7.5 4.0 - 11.0 thou/uL Final   06/26/2020 6.2 4.0 - 11.0 thou/uL Final   10/01/2019 11.6 (H) 4.0 - 11.0 thou/uL Final     Hemoglobin   Date Value Ref Range Status   12/24/2020 13.8 12.0 - 16.0 g/dL Final   06/26/2020 13.1 12.0 - 16.0 g/dL Final   10/29/2019 11.7 (L) 12.0 - 16.0 g/dL Final     Platelets   Date Value Ref Range Status   12/24/2020 332 140 - 440 thou/uL Final   06/26/2020 304 140 - 440 thou/uL Final   10/01/2019 210 140 - 440 thou/uL Final   , C Reactive Protein    CRP   Date Value Ref Range Status   12/24/2020 1.1 (H) 0.0 - 0.8 mg/dL Final   , Renal Panel  [facility]  Creatinine   Date Value Ref Range Status   12/24/2020 0.82 0.60 - 1.10 mg/dL Final   06/26/2020 0.76 0.60 - 1.10 mg/dL Final   10/22/2019 0.82 0.60 - 1.10 mg/dL Final   10/22/2019 0.80 0.60 - 1.10 mg/dL Final     Potassium   Date Value Ref Range Status   12/24/2020 4.1 3.5 - 5.0 mmol/L Final    06/26/2020 4.4 3.5 - 5.0 mmol/L Final   10/22/2019 3.9 3.5 - 5.0 mmol/L Final     Calcium   Date Value Ref Range Status   12/24/2020 10.0 8.5 - 10.5 mg/dL Final   06/26/2020 10.1 8.5 - 10.5 mg/dL Final   10/22/2019 9.8 8.5 - 10.5 mg/dL Final   10/22/2019 9.7 8.5 - 10.5 mg/dL Final    and Urine Culture    Culture   Date Value Ref Range Status   12/24/2020 No Growth  Final   09/16/2019 No Growth  Final

## 2021-06-14 NOTE — TELEPHONE ENCOUNTER
Spoke with patient who is having pain after stent removal, sounds like maybe a fragment is passing and her pain is a '10'.  She is taking protocol medications and is requesting pain medication refill.  Leila Vega RN

## 2021-06-14 NOTE — PATIENT INSTRUCTIONS - HE
Patient Stated Goal: Know what to expect after surgery  Ureteroscopy    Ureteroscopy is a procedure which is done for clearance of stones from the ureter, kidney or both. There are no incisions involved. The procedure involves your surgeon placing a small scope into your urethra. This is the opening where urine leaves your body.  The surgeon watches as they carefully guide the scope to the stone(s).  Modern flexible ureteroscopes can be used to reach virtually any location within the urinary tract.     The size, shape and location of the stone determines how best to treat the stone(s).  Whenever possible, stones are removed in one piece.  Larger stones need to be broken using a laser before removing in smaller pieces.  The goal is to remove all stones and stone fragments from that side of the body in a single treatment.  Complete stone clearance is an important step to prevent future kidney stone episodes.    Surgery:    Same day outpatient procedure    30-60 minutes    Procedure done in hospital surgical suite    General anesthesia (you will be asleep during the procedure)     Antibiotic prior to surgery to prevent infection    Physician will visit with you and respond to any questions or concerns and consent will be signed prior to going to the operating room    Risks:    Infection - Preoperative antibiotics should prevent new infections but it is possible that unanticipated bacteria may be introduced at time of surgery or that the stones were actually chronically infected before surgery      Injury - The ureter may be injured during this procedure.  This is most likely to happen if the ureter was very inflamed before surgery or if a stone is very tightly impacted.  The surgeon will not aggressively treat a stone if this creates a risk of injury.        Inaccessible Stones -A single procedure is effective in 95% of cases, but if your ureter is very narrow or your kidney stone is very impacted, a stent will be  placed and the procedure stopped.  In 1-2 weeks after the ureter has relaxed, the patient will be brought back to surgery and the procedure can be safely performed.      Incomplete stone clearance -Occasionally stone or stone fragments may not be completely cleared.  These may pass on their own, which may cause discomfort.  Our goal is to remove all possible stones and fragments.    Stent:      An internal soft tube will be placed between the kidney and the bladder while in surgery (after the stone is cleared). The stent will keep the kidney draining.    What should I expect?     It is common for a stent to cause some irritation and discomfort.   You may have:      The need to urinate suddenly     The need to urinate often     Pain during urination     A dull backache, which may get worse during urination     Blood stained urine (like fruit punch) and occasional small clots    It s important to remember the stent is necessary and only temporary. To feel more comfortable:      Drink more than you normally would but you do not have to constantly  flush your kidneys     Limiting your activity may decrease irritation or bleeding    Ibuprofen - 2 tablets every 6-8 hours     Use pain medications as directed.    When is the stent removed?    Most stents are removed within 5 days to 2 weeks after a procedure.     How is the stent removed?     Your stent will be removed in the [facility] with a small telescope and a grasping tool.  It usually takes less than 1 minute to remove the stent.    What should I expect after the stent is removed?     You should feel normal by the next day    Some patients find:    An increase in back pain about an hour after the stent is removed as the kidney fills up with urine before it starts to empty.  It can be as uncomfortable as your initial stone episode.  Taking pain medications before stent removal may be helpful, but you would need someone else to drive you to and from your  appointment.    Bladder symptoms usually disappear by the next morning.    Small amounts of blood in the urine may be seen occasionally for up to a week.    Diet:      After surgery, there are no dietary restrictions - Drink to thirst, there is no need to increase intake of fluids, as this may increase nausea symptoms. Try to eat smaller, more frequent snacks, instead of large meals.    Activity:    Many people return to work within 1-2 days. Fatigue is normal for a couple of weeks following surgery. With increased activity you may experience more discomfort and you may notice more blood in your urine.      Post-Operative Symptom Control    While you recover from your procedure, you can take steps to ease your recovery.    Medications that prevent further episodes of severe pain and help stones pass: Take these as prescribed on a regular basis even if you are NOT in pain      Ibuprofen (Advil or Motrin) - Is available over the counter Take 2 (200mg) tablets every 6 hours until the stone passes.  o prevents spasm of the ureter.    o Decreases pain      Dramamine - (drowsy version, non-generic formulation) Is available over the counter and decreases spasm of the ureter.  Take 50mg at bedtime every night until the stone passes. In addition, take every 6 hours as needed.  Dramamine:  o Decreases nausea  o Decreases acute pain  o Decreases recurrence of pain for next 24 hours  o Will help you sleep        *This medication will cause increased drowsiness, do not drive or operate machinery for 6 hours      Flomax- Studies show that Flomax decreases irritation from stents.   o Take every day with food until stone passes even if you do not have pain  o Flomax does not relieve pain.        *This medication may cause nasal congestion or light-headedness      Detrol ( Tolterodine) - After surgery Detrol may decrease stent irritation and pelvic pain  o Take as prescribed     *This medication may cause dry mouth, constipation or  blurry vision. Stop medication if unable to urinate.    Medication that are taken as needed to manage break through symptoms: Take these ONLY as required and hopefully not at all      Narcotics (Percocet, Vicodin, Dilaudid)- take as prescribed for severe pain unrelieved by ibuprofen and dramamine  o Take as prescribed for severe pain  o Narcotics have significant side effects and only  cover-up  pain. They have no effect on cause of pain.  o Common side effects:  - Confusion, disorientation and sedation - DO NOT DRIVE OR OPERATE MACHINERY WITHIN 24 HOURS  - Nausea - take Dramamine or Zofran  or Haldol to help control  - Constipation  - Sleep disturbances      Ondansetron (Zofran)-  o Take as prescribed  o Reserve for severe nausea  o May cause constipation, start over the counter Miralax if needed to treat this    Haldol-  o Take as prescribed  o Reserve for severe nausea    Warning Signs/Symptoms - Please call the [facility] 24 hours a day at [phone] IMMEDIATELY if you experience any of these:    Fever greater than 100.1     Chills    Pain NOT CONTROLLED by pain medications    Heavy bleeding or large clots in urine (small clots can be normal)    Persistent nausea and/or vomiting    Post-Operative Follow up:    The stone(s) will be sent from surgery to a lab for composition analysis.  These results are usually available before a one month post-operative visit.  If you had laser treatment to break up your stone, you will usually be scheduled for a low dose CT scan prior to your one month appointment.  This scan allows your surgeon to confirm that all stone fragments were cleared at time of surgery and that there have been no complications.  These results along with possible labs and urine studies will help us develop an individualized plan to prevent new stones from forming and keep existing stones from enlarging.  This visit is usually scheduled about 1 month after your original surgery.    The  [facility] can respond to your questions or concerns 24 hours a day at [phone].

## 2021-06-14 NOTE — ANESTHESIA CARE TRANSFER NOTE
Last vitals:   Vitals:    01/22/21 1007   BP: (P) 150/86   Pulse: (!) (P) 108   Resp: (P) 16   Temp: (P) 36.1  C (97  F)   SpO2: (P) 99%     Patient spontaneous RR, TV 300s, LMA removed to facemask 10LPM, O2 sats 100%. VSS. Report to RN.    Patient's level of consciousness is drowsy  Spontaneous respirations: yes  Maintains airway independently: yes  Dentition unchanged: yes  Oropharynx: oropharynx clear of all foreign objects    QCDR Measures:  ASA# 20 - Surgical Safety Checklist: WHO surgical safety checklist completed prior to induction    PQRS# 430 - Adult PONV Prevention: 4558F - Pt received => 2 anti-emetic agents (different classes) preop & intraop  ASA# 8 - Peds PONV Prevention: NA - Not pediatric patient, not GA or 2 or more risk factors NOT present  PQRS# 424 - [place]-op Temp Management: 4559F - At least one body temp DOCUMENTED => 35.5C or 95.9F within required timeframe  PQRS# 426 - PACU Transfer Protocol: - Transfer of care checklist used  ASA# 14 - Acute Post-op Pain: ASA14B - Patient did NOT experience pain >= 7 out of 10

## 2021-06-19 NOTE — LETTER
Letter by Nathalie Lowery APRN, CNM at      Author: Nathalie Lowery APRN, CNM Service: -- Author Type: --    Filed:  Encounter Date: 9/18/2019 Status: (Other)         September 18, 2019     Patient: Georgette Sanchez   YOB: 1985   Date of Visit: 9/18/2019       To Whom It May Concern:    It is my medical opinion that Georgette Sanchez should remain out of work until after her baby is born, due to complications of pregnancy.    If you have any questions or concerns, please don't hesitate to call.    Sincerely,        Electronically signed by Nathalie Lowery CNM, MARCO MCCURDY

## 2021-06-19 NOTE — LETTER
Letter by Deyanira Martinez APRN, CNM at      Author: Deyanira Martinez APRN, CNM Service: -- Author Type: --    Filed:  Date of Service:  Status: (Other)       September 16, 2019     Patient: Georgette Sanchez   YOB: 1985   Date of Visit: 9/5/2019       To Whom It May Concern:    It is my medical opinion that Georgette Sanchez should remain out of work until Wednesday September 18. However, she may need more time off as her condition is not improving as of yet. She was in patient twice this last weekend, both on Friday-Saturday and Sunday-Monday. She will need at least a few days to recover. She also has follow up appointments for this condition later this week and will need time off for those appointments.     If you have any questions or concerns, please don't hesitate to call.    Sincerely,        CALLI Coy CNM

## 2021-06-27 NOTE — PROGRESS NOTES
"Progress Notes by Sara Burton MD at 2019  8:53 AM     Author: Sara Burton MD Service: Family Medicine Author Type: Resident    Filed: 2019  9:54 AM Date of Service: 2019  8:53 AM Status: Attested    : Sara Burton MD (Resident) Cosigner: Jomar Roe MD at 2019  9:42 PM    Attestation signed by Jomar Roe MD at 2019  9:42 PM    2019  [employer] Faculty Attestation  I have discussed the case with the resident physician(s), Dr. Burton.  I agree with the  assessment and plan.    Jomar Roe MD                          [facility] Medicine OB Triage    Subjective: Georgette Sanchez is a  34 y.o. female at 33w1d with a current prenatal history significant for history of kidney stones who presents to OB triage with back pain, pressure, bladder pressure. Patient reports back pain started early this morning. Patient came to triage due to increasing discomfort. En route, the pain and pressure startedto be reieved. Patient describes pain traversing down towards bladder wiith increased bladder pressure and warmth. This is consistent with prior episodes of passing kidney stones. While in triage, patient passed visible kidney stone.She has not had a kidney stone in pregnancy so was concerned. Fetal Movement: normal.    Estimated Date of Delivery: 10/7/19 Patient's last menstrual period was 2018.  OB provider: Provider, No Primary Care  OB History        1    Para        Term                AB        Living           SAB        TAB        Ectopic        Multiple        Live Births   0                 Objective:   Height 5' 10\" (1.778 m), weight (!) 296 lb (134.3 kg), last menstrual period 2018.  General:   alert, appears stated age and cooperative   Skin:   normal and no rash or abnormalities   HEENT:  extra ocular movement intact, sclera clear, anicteric and neck supple with midline trachea   Lungs:   clear to " auscultation bilaterally   Heart:   regular rate and rhythm, S1, S2 normal, no murmur, click, rub or gallop   Extremities: Warm, dry and well perfused. trace edema.   Neuro: Reflexes 2+ and symmetric.    Abdomen: FHT present   Membranes intact   [place]:  None   FHT: Baseline: 125 bpm, Variability: Moderate (6 - 25 bpm), Accelerations: Present and Decelerations: Absent   Cervix: Deferred      Laboratory Studies:   Results for orders placed or performed during the hospital encounter of 19   Urinalysis   Result Value Ref Range    Color, UA Yellow Colorless, Yellow, Straw, Light Yellow    Clarity, UA Clear Clear    Glucose, UA Negative Negative    Bilirubin, UA Negative Negative    Ketones, UA Negative Negative    Specific Gravity, UA 1.005 1.001 - 1.030    Blood, UA Large (!) Negative    pH, UA 7.0 4.5 - 8.0    Protein, UA Negative Negative mg/dL    Urobilinogen, UA <2.0 E.U./dL <2.0 E.U./dL, 2.0 E.U./dL    Nitrite, UA Negative Negative    Leukocytes, UA Negative Negative    Bacteria, UA Few (!) None Seen hpf    RBC, UA 0-2 None Seen, 0-2 hpf    WBC, UA 0-5 None Seen, 0-5 hpf    Squam Epithel, UA 5-10 (!) None Seen, 0-5 lpf    Mucus, UA Few (!) None Seen lpf    Hyaline Casts, UA 0-5 0-5, None Seen lpf        ASSESSMENT AND PLAN: Georgette Sanchez is a  34 y.o. female who presented to [facility] at 33w1d on 2019 with resolving back pain and pressure and a passed kidney stone with pain resolution consistent with prior episodes of kidney stones before pregnancy. UA does not indicate infection.  1. Not in labor.  2.  Kidney stone analysis  3. Increase hydration    Patient discussed with attending physician, Dr.Christopher Roe, who agrees with the plan.      Sara Burton MD PGY1 2019  [facility]

## 2021-07-14 PROBLEM — R34 LOW URINE OUTPUT: Status: RESOLVED | Noted: 2019-11-13 | Resolved: 2020-06-26

## 2021-07-14 PROBLEM — O99.213 OBESITY AFFECTING PREGNANCY IN THIRD TRIMESTER: Status: RESOLVED | Noted: 2019-02-28 | Resolved: 2019-11-07

## 2021-07-14 PROBLEM — O99.013 ANEMIA DURING PREGNANCY IN THIRD TRIMESTER: Status: RESOLVED | Noted: 2019-09-09 | Resolved: 2019-10-29

## 2021-08-03 PROBLEM — E66.01 MORBID OBESITY (H): Status: RESOLVED | Noted: 2018-08-22 | Resolved: 2019-10-29

## 2021-10-04 ENCOUNTER — HEALTH MAINTENANCE LETTER (OUTPATIENT)
Age: 36
End: 2021-10-04

## 2022-01-23 ENCOUNTER — HEALTH MAINTENANCE LETTER (OUTPATIENT)
Age: 37
End: 2022-01-23

## 2022-02-17 PROBLEM — O09.519 SUPERVISION OF HIGH-RISK PREGNANCY OF ELDERLY PRIMIGRAVIDA: Status: RESOLVED | Noted: 2019-02-28 | Resolved: 2019-11-07

## 2022-09-11 ENCOUNTER — HEALTH MAINTENANCE LETTER (OUTPATIENT)
Age: 37
End: 2022-09-11

## 2022-11-17 ENCOUNTER — E-VISIT (OUTPATIENT)
Dept: URGENT CARE | Facility: CLINIC | Age: 37
End: 2022-11-17
Payer: COMMERCIAL

## 2022-11-17 ENCOUNTER — TELEPHONE (OUTPATIENT)
Dept: NURSING | Facility: CLINIC | Age: 37
End: 2022-11-17

## 2022-11-17 DIAGNOSIS — L30.9 ECZEMA, UNSPECIFIED TYPE: Primary | ICD-10-CM

## 2022-11-17 PROCEDURE — 99422 OL DIG E/M SVC 11-20 MIN: CPT | Performed by: PREVENTIVE MEDICINE

## 2022-11-17 RX ORDER — TRIAMCINOLONE ACETONIDE 1 MG/G
OINTMENT TOPICAL 2 TIMES DAILY
Qty: 80 G | Refills: 0 | Status: SHIPPED | OUTPATIENT
Start: 2022-11-17 | End: 2022-12-01

## 2022-11-17 NOTE — TELEPHONE ENCOUNTER
Patient was trying to reply to her evisit from today.    She was unable to reply.    She states she has a history of shingles in the area her rash is in.  She is wondering if this could be another flair up of shingles and if she should take something orally for this?    Please advise.    Alyssia Cam RN on 11/17/2022 at 12:44 PM

## 2022-11-17 NOTE — PATIENT INSTRUCTIONS
FRANKLIN Palomino - Apply triamcinolone ointment two times per day for 2 weeks to rash.  I have prescribed this for you.  Follow up in person in clinic if not resolving.  Good luck!    Dr Stephens

## 2023-04-30 ENCOUNTER — HEALTH MAINTENANCE LETTER (OUTPATIENT)
Age: 38
End: 2023-04-30

## 2023-06-30 ENCOUNTER — OFFICE VISIT (OUTPATIENT)
Dept: FAMILY MEDICINE | Facility: CLINIC | Age: 38
End: 2023-06-30
Payer: COMMERCIAL

## 2023-06-30 ENCOUNTER — HOSPITAL ENCOUNTER (OUTPATIENT)
Dept: CT IMAGING | Facility: HOSPITAL | Age: 38
Discharge: HOME | End: 2023-06-30
Payer: COMMERCIAL

## 2023-06-30 VITALS
RESPIRATION RATE: 16 BRPM | DIASTOLIC BLOOD PRESSURE: 93 MMHG | OXYGEN SATURATION: 97 % | WEIGHT: 293 LBS | HEART RATE: 96 BPM | TEMPERATURE: 98.1 F | SYSTOLIC BLOOD PRESSURE: 147 MMHG | BODY MASS INDEX: 44.01 KG/M2

## 2023-06-30 DIAGNOSIS — N83.202 CYSTS OF BOTH OVARIES: ICD-10-CM

## 2023-06-30 DIAGNOSIS — R10.9 ACUTE RIGHT FLANK PAIN: ICD-10-CM

## 2023-06-30 DIAGNOSIS — N83.201 CYSTS OF BOTH OVARIES: ICD-10-CM

## 2023-06-30 DIAGNOSIS — N20.1 URETERAL CALCULUS, RIGHT: Primary | ICD-10-CM

## 2023-06-30 LAB
ALBUMIN UR-MCNC: NEGATIVE MG/DL
AMORPH CRY #/AREA URNS HPF: ABNORMAL /HPF
APPEARANCE UR: ABNORMAL
BACTERIA #/AREA URNS HPF: ABNORMAL /HPF
BILIRUB UR QL STRIP: NEGATIVE
COLOR UR AUTO: YELLOW
GLUCOSE UR STRIP-MCNC: NEGATIVE MG/DL
HGB UR QL STRIP: ABNORMAL
KETONES UR STRIP-MCNC: ABNORMAL MG/DL
LEUKOCYTE ESTERASE UR QL STRIP: NEGATIVE
NITRATE UR QL: NEGATIVE
PH UR STRIP: 7.5 [PH] (ref 5–8)
RBC #/AREA URNS AUTO: ABNORMAL /HPF
SP GR UR STRIP: 1.02 (ref 1–1.03)
SQUAMOUS #/AREA URNS AUTO: ABNORMAL /LPF
UROBILINOGEN UR STRIP-ACNC: 2 E.U./DL
WBC #/AREA URNS AUTO: ABNORMAL /HPF

## 2023-06-30 PROCEDURE — 81001 URINALYSIS AUTO W/SCOPE: CPT

## 2023-06-30 PROCEDURE — 74176 CT ABD & PELVIS W/O CONTRAST: CPT

## 2023-06-30 PROCEDURE — 99214 OFFICE O/P EST MOD 30 MIN: CPT

## 2023-06-30 RX ORDER — TAMSULOSIN HYDROCHLORIDE 0.4 MG/1
0.4 CAPSULE ORAL DAILY
Qty: 30 CAPSULE | Refills: 0 | Status: SHIPPED | OUTPATIENT
Start: 2023-06-30 | End: 2023-07-19

## 2023-06-30 RX ORDER — IBUPROFEN 200 MG
800 TABLET ORAL EVERY 4 HOURS PRN
COMMUNITY
End: 2023-07-19

## 2023-06-30 NOTE — PATIENT INSTRUCTIONS
Diagnosis: kidney stone _ nephrolithiasis   Plan:   Pain control    Follow up with PCp if able   Most stones will pass on their own   Concern with they are too big to pass and can become stuck in ureter - Emergency Room   Monitor for severe pain  Decreased urine output   Drink plenty of fluids. This means at least 12, 8-ounce glasses of fluid--mostly water--a day.  Try to stay as active as possible. This will help the stone pass.   Don't stay in bed unless your pain keeps you from getting up.   You may notice a red, pink, or brown color to your urine. This is normal while passing a kidney stone.  Need to follow up with PCP in 24-48 hours to reassess if stone is passing, vs renal speciality   Also discuss prevention of stone formation in the future     Monitor for:   Weakness, dizziness, or fainting  Intense pain that does not go away   Repeated vomiting or unable to keep down fluids  Fever of 101 F  Passage of solid red or brown urine (can't see through it) or urine with lots of blood clots  Foul-smelling or cloudy urine  Unable to pass urine for 8 hours and increasing bladder pressure        Kidney Stones Nephrolithiasis   The sharp cramping pain on either side of your lower back and nausea or vomiting that you have are because of a small stone that has formed in the kidney.   It's now passing down a narrow tube (ureter) on its way to your bladder.   Once the stone reaches your bladder, the pain will often stop.   But it may come back as the stone continues to pass out of the bladder and through the urethra.   The stone may pass in your urine stream in one piece.   The size may be 1/16 inch to 1/4 inch (1 mm to 6 mm). Or, the stone may break up into lawrence fragments that you may not even notice.  Once you have had a kidney stone, you are at risk of getting another one in the future.   There are 4 types of kidney stones. 80% percent are calcium stones--mostly calcium oxalate but also some with calcium phosphate.    The other 3 types include uric acid stones, struvite stones (from a preceding infection), and rarely, cystine stones.  Most stones will pass on their own, but may take from a few hours to a few days.   Sometimes the stone is too large to pass by itself. In that case, the healthcare provider will need to use other ways to remove the stone.

## 2023-06-30 NOTE — PROGRESS NOTES
URGENT CARE  Assessment & Plan   Assessment:   Georgette Simons is a 38 year old female who's clinical presentation today is consistent with:   1. Ureteral calculus, right  - UA Macroscopic with reflex to Microscopic and Culture  - UA Microscopic with Reflex to Culture  - tamsulosin (FLOMAX) 0.4 MG capsule; Take 1 capsule (0.4 mg) by mouth daily  Dispense: 30 capsule; Refill: 0    2. Cysts of both ovaries  - Ob/Gyn Referral; Future      No alarm signs or symptoms present   Differential Diagnoses for this patient's CC include   UTI, pyelonephritis, renal cell pathology   Ovarian pathology, PID, dysmenorrhea   Colonic pathology   Bowel obstruction, constipation, diverticular disease,  musculoskeletal pathology,  Gallbladder etiology, acute abdomen:  appendicitis     Plan:  Urine analysis did not show any WBCs suggesting a UTI,  I am suspicious for nephrolithiasis and thus sent  patient for a  CT for a more definite diagnosis and for stone confirmation and sizing. results showed: a 2 mm proximal right ureter stone  And two more stones in the right kidney   Educated patient that most stones will pass on their own if <5mm), but that they need to follow up w/ their PCP ([facility])  in the next 2-3 days to be re-evaluated regarding stone expulsion.} - patient states she has an apt with the [facility] institute on 7/5/23 in five days and she is fine with following up with them at that time.    Encouraged pain control today with tylenol and NSAIDs, discussed renal colic and the waxing and weaning of pain, also discussed possibility of ureteral stone getting stuck and not passing, and that this would present as severe pain lasting >2-3 hours and decreased urine output   Discussed w/ patient the importance of getting plenty of fluids and staying active to help the stone pass.   Educated patient to monitor for worsening condition such as: intense pain, new flank pain, fevers, blood clots in the urine, decreased urine output  and foul smelling urine, encouraged them to present to the ED if they notice any of these signs.    Patient is} agreeable to treatment plan and state they will follow-up if symptoms do not improve and/or if symptoms worsen (see patient's AVS 'monitor for' section for specific patient instructions given and discussed regarding what to watch for and when to follow up)    Medications ordered are listed above, please see AVS for patient's specific and personalized discharge instructions given     CALLI [facility]      ______________________________________________________________________        Subjective  Subjective     HPI: Georgette Simons  is a 38 year old  female who presents today for evaluation the following concerns:   The patient presents today complaining of right sided flank pain for 2 days which started on 6/28/23  Patient states the pain waxes and wanes, comes and goes and can be so severe she gets nausea at times.   Patient states she has a history of stones and [facility]. Patient states her last stone was in 2021 and was 8mm and needed to be surgically removed.   Patient denies nay blood in her urine     Patient denies urinary symptoms of: burning, pelvic pain, and sensation of incomplete bladder emptying or frequency   Patient denies fever, chills, or any abnormal vaginal discharge/odor or bleeding.      Allergies   Allergen Reactions     Pcn [Penicillins] Rash     Patient Active Problem List   Diagnosis     Nephrolithiasis     Family history of breast cancer     Carbuncle     Folliculitis     Morbid obesity (H)     Bilateral low back pain without sciatica     Supervision of high-risk pregnancy of elderly primigravida     History of chlamydia     Obesity affecting pregnancy in third trimester     History of herpes genitalis     Status post breast reduction     BMI 40.0-44.9, adult (H)     Encounter for triage in pregnant patient        Review of Systems:  Pertinent review of systems as reflected in HPI, otherwise negative.     Objective  Objective    Physical Exam:  Vitals:    06/30/23 1704   BP: (!) 147/93   Pulse: 96   Resp: 16   Temp: 98.1  F (36.7  C)   TempSrc: Oral   SpO2: 97%   Weight: 139.1 kg (306 lb 11.2 oz)      General: Alert and oriented, no acute distress, afebrile, normotensive  Psy/mental status: Nonanxious, cooperative  SKIN: Intact, no open areas  ABDOMEN:  soft, non-tender, non-distended    Right sided flank pain / CVA tenderness to palpation   Pelvic/ :   Deferred per patient       LABS:   Results for orders placed or performed in visit on 06/30/23   UA Macroscopic with reflex to Microscopic and Culture     Status: Abnormal    Specimen: Urine, Clean Catch   Result Value Ref Range    Color Urine Yellow Colorless, Straw, Light Yellow, Yellow    Appearance Urine Cloudy (A) Clear    Glucose Urine Negative Negative mg/dL    Bilirubin Urine Negative Negative    Ketones Urine Trace (A) Negative mg/dL    Specific Gravity Urine 1.020 1.005 - 1.030    Blood Urine Trace (A) Negative    pH Urine 7.5 5.0 - 8.0    Protein Albumin Urine Negative Negative mg/dL    Urobilinogen Urine 2.0 (A) 0.2, 1.0 E.U./dL    Nitrite Urine Negative Negative    Leukocyte Esterase Urine Negative Negative   UA Microscopic with Reflex to Culture     Status: Abnormal   Result Value Ref Range    Bacteria Urine Few (A) None Seen /HPF    RBC Urine 0-2 0-2 /HPF /HPF    WBC Urine 0-5 0-5 /HPF /HPF    Squamous Epithelials Urine Moderate (A) None Seen /LPF    Amorphous Crystals Urine Many (A) None Seen /HPF    Narrative    Urine Culture not indicated       Imaging:   All images were personally read by this provider (myself).   Per my interpretation the CT scan shows one two mm stone in the right ureter causing mild hydronephrosis     I explained my diagnostic considerations and recommendations to the patient, who voiced understanding and agreement with the  treatment plan.   All questions were answered.   We discussed potential side effects, risks and benefits of any prescribed or recommended therapies, as well as expectations for response to treatments.  Please see AVS for any patient instructions & handouts given.   Patient was advised to contact the Nurse Care Line, their Primary Care provider, Urgent Care, or the Emergency Department if there are new or worsening symptoms, or call 911 for emergencies.

## 2023-07-19 ENCOUNTER — VIRTUAL VISIT (OUTPATIENT)
Dept: UROLOGY | Facility: CLINIC | Age: 38
End: 2023-07-19
Payer: COMMERCIAL

## 2023-07-19 DIAGNOSIS — N20.0 NEPHROLITHIASIS: Primary | ICD-10-CM

## 2023-07-19 PROCEDURE — 99213 OFFICE O/P EST LOW 20 MIN: CPT | Mod: VID | Performed by: STUDENT IN AN ORGANIZED HEALTH CARE EDUCATION/TRAINING PROGRAM

## 2023-07-19 ASSESSMENT — PAIN SCALES - GENERAL: PAINLEVEL: NO PAIN (0)

## 2023-07-19 NOTE — PROGRESS NOTES
Patient is roomed via telephone for a telehealth visit.  Patient confirmed she is in the [facility] at the time of this appointment.  Patient understand that this visit is billable and agree to proceed with appointment.

## 2023-07-19 NOTE — PROGRESS NOTES
Georgette is a 38 year old who is being evaluated via a billable video visit.    Video-Visit Details    Type of service:  Video Visit   Video start time: 3:03 PM  Video end time: 3:10 PM    Originating Location (pt. Location): Home    Distant Location (provider location):  On-site  Platform used for Video Visit: dooyoo     HPI:  Georgette Simons is a 38 year old female being seen for follow-up after recent ER visit with ureteral stone.  Duration of problem: 3 weeks  Previous treatments: Prior history of ureteral stones and surgery with Dr. Lewis, last follow-up in 2021      Reviewed previous notes  Currently doing much better no pain at this point  Feels that she has passed the stone already  Has not had a recent 24-hour urine evaluation         Review of Systems:  From intake questionnaire     Skin: negative  Eyes: negative  Ears/Nose/Throat: negative  Respiratory: No shortness of breath, dyspnea on exertion, cough, or hemoptysis  Cardiovascular: No chest pain or palpitations  Gastrointestinal: negative; no nausea/vomiting, constipation or diarrhea  Genitourinary: as per HPI  Musculoskeletal: negative  Neurologic: negative  Psychiatric: negative  Hematologic/Lymphatic/Immunologic: negative  Endocrine: negative         Physical Exam:   This is a virtual visit    Alert, no acute distress, oriented, conversant    Ears/nose/mouth: mouth:normal, good dentition  Respiratory: no respiratory distress, or pursed lip breathing  Cardiovascular:no obvious jugular venous distension present  Skin: no suspicious lesions or rashes on Visible body parts on the Screen  Neuro: Alert, oriented, speech and mentation normal  Psych: affect and mood normal, alert and oriented to person, place and time  Review of Imaging:  The following imaging exams were independently viewed and interpreted by me and discussed with patient:  CT Scan Abd/Pelvis: Abnormal: I reviewed the CT images and agree with the findings of the radiologist  2 mm size  proximal right ureteral  stone      Review of Labs:  The following labs were reviewed by me and discussed with the patient:  Stone analysis 2019: Abnormal: Calcium oxalate monohydrate dihydrate and calcium phosphate stones    Assessment & Plan     Nephrolithiasis  Recurrent kidney stone former  Recommend getting 24-hour urine evaluation done this time around  We will continue with yearly imaging concerning the  insignificant stone burden at this point  Follow-up in 3 months to discuss the results          Tom Thrasher MD  [facility]      ==========================    Additional Billing and Coding Information:  Review of external notes as documented above   Review of the result(s) of each unique test - Stone analysis    Independent interpretation of a test performed by another physician/other qualified health care professional (not separately reported) -   CT abdomen pelvis,    Discussion of management or test interpretation with external physician/other qualified healthcare professional/appropriate source -         12 minutes spent by me on the date of the encounter doing chart review, review of test results, interpretation of tests, patient visit and documentation

## 2023-09-09 NOTE — MR AVS SNAPSHOT
After Visit Summary   8/22/2018    Georgette Sanchez    MRN: [id number]           Patient Information     Date Of Birth          1985        Visit Information        Provider Department      8/22/2018 2:20 PM Krystle Suárez MD [facility]        Today's Diagnoses     Lumbar spine pain    -  1    Nausea        Dysmenorrhea           Follow-ups after your visit        Additional Services     OB/GYN REFERRAL       Your provider has referred you to:  [facility]: [facility] [phone]   [email or web address]      Please be aware that coverage of these services is subject to the terms and limitations of your health insurance plan.  Call member services at your health plan with any benefit or coverage questions.      Please bring the following with you to your appointment:    (1) Any X-Rays, CTs or MRIs which have been performed.  Contact the facility where they were done to arrange for  prior to your scheduled appointment.   (2) List of current medications   (3) This referral request   (4) Any documents/labs given to you for this referral                  Who to contact     If you have questions or need follow up information about today's clinic visit or your schedule please contact [facility] directly at [phone].  Normal or non-critical lab and imaging results will be communicated to you by MyChart, letter or phone within 4 business days after the clinic has received the results. If you do not hear from us within 7 days, please contact the clinic through MyChart or phone. If you have a critical or abnormal lab result, we will notify you by phone as soon as possible.  Submit refill requests through [employer] or call your pharmacy and they will forward the refill request to us. Please allow 3 business days for your refill to be completed.          Additional Information About Your Visit        MyChart  "Information     [employer] gives you secure access to your electronic health record. If you see a primary care provider, you can also send messages to your care team and make appointments. If you have questions, please call your primary care clinic.  If you do not have a primary care provider, please call [phone] and they will assist you.        Care EveryWhere ID     This is your Care EveryWhere ID. This could be used by other organizations to access your [place] medical records  [id number]        Your Vitals Were     Pulse Temperature Height Last Period Pulse Oximetry Breastfeeding?    82 98  F (36.7  C) (Oral) 5' 9.2\" (1.758 m) 08/22/2018 (Exact Date) 98% No    BMI (Body Mass Index)                   40.41 kg/m2            Blood Pressure from Last 3 Encounters:   08/22/18 131/88   06/04/18 120/80   03/27/18 100/80    Weight from Last 3 Encounters:   08/22/18 275 lb 3.2 oz (124.8 kg)   06/04/18 263 lb 3.2 oz (119.4 kg)   03/27/18 251 lb 3.2 oz (113.9 kg)              We Performed the Following     Beta HCG Qual, Urine - [facility] and [place] ([id number])     Comprehensive metabolic panel     OB/GYN REFERRAL          Today's Medication Changes          These changes are accurate as of 8/22/18 11:59 PM.  If you have any questions, ask your nurse or doctor.               Start taking these medicines.        Dose/Directions    LORazepam 0.5 MG tablet   Commonly known as:  ATIVAN   Used for:  Lumbar spine pain   Started by:  Krystle Suárez MD        Dose:  0.25-0.5 mg   Take 0.5-1 tablets (0.25-0.5 mg) by mouth once as needed for anxiety Prior to procedure   Quantity:  4 tablet   Refills:  0            Where to get your medicines      Some of these will need a paper prescription and others can be bought over the counter.  Ask your nurse if you have questions.     Bring a paper prescription for each of these medications     LORazepam 0.5 MG tablet                Primary Care Provider Fax #    Physician No " Ref-Primary [phone]       No address on file        Equal Access to Services     KRISTEL CHIO : Hadii johana martinez rashmi Muniz, washardada keyurklausha, sadie matamoros yeyofidelina, waxamalia dannyin hayaalorena oronamary wilkerson la'brendenlorena boo. [facility] [phone].    ATENCIÓN: Si habla español, tiene a watts disposición servicios gratuitos de asistencia lingüística. Llame al [phone].    We comply with applicable federal civil rights laws and [place] laws. We do not discriminate on the basis of race, color, national origin, age, disability, sex, sexual orientation, or gender identity.            Thank you!     Thank you for choosing [facility]  for your care. Our goal is always to provide you with excellent care. Hearing back from our patients is one way we can continue to improve our services. Please take a few minutes to complete the written survey that you may receive in the mail after your visit with us. Thank you!             Your Updated Medication List - Protect others around you: Learn how to safely use, store and throw away your medicines at www.disposemymeds.org.          This list is accurate as of 8/22/18 11:59 PM.  Always use your most recent med list.                   Brand Name Dispense Instructions for use Diagnosis    cephALEXin 500 MG capsule    KEFLEX    20 capsule    Take 1 capsule (500 mg) by mouth 2 times daily    Carbuncle, Folliculitis       CHLORTHALIDONE PO      Take 25 mg by mouth daily        ibuprofen 600 MG tablet    ADVIL/MOTRIN    30 tablet    Take 1 tablet (600 mg) by mouth every 6 hours as needed for moderate pain        LORazepam 0.5 MG tablet    ATIVAN    4 tablet    Take 0.5-1 tablets (0.25-0.5 mg) by mouth once as needed for anxiety Prior to procedure    Lumbar spine pain          2058

## 2023-10-18 ENCOUNTER — ALLIED HEALTH/NURSE VISIT (OUTPATIENT)
Dept: FAMILY MEDICINE | Facility: CLINIC | Age: 38
End: 2023-10-18
Payer: COMMERCIAL

## 2023-10-18 DIAGNOSIS — Z23 NEED FOR INFLUENZA VACCINATION: Primary | ICD-10-CM

## 2023-10-18 PROCEDURE — 90471 IMMUNIZATION ADMIN: CPT

## 2023-10-18 PROCEDURE — 99207 PR NO CHARGE NURSE ONLY: CPT

## 2023-10-18 PROCEDURE — 90686 IIV4 VACC NO PRSV 0.5 ML IM: CPT

## 2023-11-09 ENCOUNTER — OFFICE VISIT (OUTPATIENT)
Dept: FAMILY MEDICINE | Facility: CLINIC | Age: 38
End: 2023-11-09
Payer: COMMERCIAL

## 2023-11-09 VITALS
RESPIRATION RATE: 16 BRPM | HEIGHT: 69 IN | TEMPERATURE: 98.1 F | WEIGHT: 293 LBS | DIASTOLIC BLOOD PRESSURE: 84 MMHG | OXYGEN SATURATION: 97 % | SYSTOLIC BLOOD PRESSURE: 136 MMHG | BODY MASS INDEX: 43.4 KG/M2 | HEART RATE: 95 BPM

## 2023-11-09 DIAGNOSIS — Z13.220 SCREENING FOR LIPID DISORDERS: ICD-10-CM

## 2023-11-09 DIAGNOSIS — Z97.5 IUD (INTRAUTERINE DEVICE) IN PLACE: ICD-10-CM

## 2023-11-09 DIAGNOSIS — E66.813 CLASS 3 OBESITY: ICD-10-CM

## 2023-11-09 DIAGNOSIS — Z80.3 FAMILY HISTORY OF MALIGNANT NEOPLASM OF BREAST: ICD-10-CM

## 2023-11-09 DIAGNOSIS — L02.92 BOIL: ICD-10-CM

## 2023-11-09 DIAGNOSIS — Z12.4 CERVICAL CANCER SCREENING: ICD-10-CM

## 2023-11-09 DIAGNOSIS — Z00.00 ROUTINE GENERAL MEDICAL EXAMINATION AT A HEALTH CARE FACILITY: ICD-10-CM

## 2023-11-09 DIAGNOSIS — Z11.59 NEED FOR HEPATITIS C SCREENING TEST: Primary | ICD-10-CM

## 2023-11-09 DIAGNOSIS — Z12.31 ENCOUNTER FOR SCREENING MAMMOGRAM FOR MALIGNANT NEOPLASM OF BREAST: ICD-10-CM

## 2023-11-09 DIAGNOSIS — Z82.49 FAMILY HISTORY OF HYPERTROPHIC CARDIOMYOPATHY: ICD-10-CM

## 2023-11-09 DIAGNOSIS — Z13.1 SCREENING FOR DIABETES MELLITUS: ICD-10-CM

## 2023-11-09 PROBLEM — E83.52 HYPERCALCEMIA: Status: ACTIVE | Noted: 2018-02-15

## 2023-11-09 PROBLEM — R42 CHRONIC VERTIGO: Status: ACTIVE | Noted: 2018-05-01

## 2023-11-09 PROBLEM — L98.9 SKIN ABNORMALITY: Status: ACTIVE | Noted: 2019-10-29

## 2023-11-09 PROBLEM — E87.6 HYPOKALEMIA: Status: ACTIVE | Noted: 2018-02-15

## 2023-11-09 PROBLEM — R82.992 HYPEROXALURIA: Status: ACTIVE | Noted: 2019-11-13

## 2023-11-09 PROBLEM — Z98.891 HISTORY OF PRIMARY CESAREAN SECTION: Status: RESOLVED | Noted: 2019-09-30 | Resolved: 2023-11-09

## 2023-11-09 PROBLEM — N20.1 CALCULUS OF URETER: Status: ACTIVE | Noted: 2021-01-13

## 2023-11-09 LAB
ALBUMIN SERPL BCG-MCNC: 4.3 G/DL (ref 3.5–5.2)
ALP SERPL-CCNC: 77 U/L (ref 35–104)
ALT SERPL W P-5'-P-CCNC: 29 U/L (ref 0–50)
ANION GAP SERPL CALCULATED.3IONS-SCNC: 10 MMOL/L (ref 7–15)
AST SERPL W P-5'-P-CCNC: 21 U/L (ref 0–45)
BILIRUB SERPL-MCNC: 0.2 MG/DL
BUN SERPL-MCNC: 11 MG/DL (ref 6–20)
CALCIUM SERPL-MCNC: 9.9 MG/DL (ref 8.6–10)
CHLORIDE SERPL-SCNC: 102 MMOL/L (ref 98–107)
CHOLEST SERPL-MCNC: 200 MG/DL
CREAT SERPL-MCNC: 0.66 MG/DL (ref 0.51–0.95)
DEPRECATED HCO3 PLAS-SCNC: 25 MMOL/L (ref 22–29)
EGFRCR SERPLBLD CKD-EPI 2021: >90 ML/MIN/1.73M2
ERYTHROCYTE [DISTWIDTH] IN BLOOD BY AUTOMATED COUNT: 12.9 % (ref 10–15)
GLUCOSE SERPL-MCNC: 81 MG/DL (ref 70–99)
HBA1C MFR BLD: 5.4 % (ref 0–5.6)
HCT VFR BLD AUTO: 40.2 % (ref 35–47)
HDLC SERPL-MCNC: 33 MG/DL
HGB BLD-MCNC: 13.5 G/DL (ref 11.7–15.7)
LDLC SERPL CALC-MCNC: 128 MG/DL
MCH RBC QN AUTO: 31.3 PG (ref 26.5–33)
MCHC RBC AUTO-ENTMCNC: 33.6 G/DL (ref 31.5–36.5)
MCV RBC AUTO: 93 FL (ref 78–100)
NONHDLC SERPL-MCNC: 167 MG/DL
PLATELET # BLD AUTO: 316 10E3/UL (ref 150–450)
POTASSIUM SERPL-SCNC: 4 MMOL/L (ref 3.4–5.3)
PROT SERPL-MCNC: 7.4 G/DL (ref 6.4–8.3)
RBC # BLD AUTO: 4.31 10E6/UL (ref 3.8–5.2)
SODIUM SERPL-SCNC: 137 MMOL/L (ref 135–145)
TRIGL SERPL-MCNC: 193 MG/DL
WBC # BLD AUTO: 10.2 10E3/UL (ref 4–11)

## 2023-11-09 PROCEDURE — 91320 SARSCV2 VAC 30MCG TRS-SUC IM: CPT | Performed by: STUDENT IN AN ORGANIZED HEALTH CARE EDUCATION/TRAINING PROGRAM

## 2023-11-09 PROCEDURE — 83036 HEMOGLOBIN GLYCOSYLATED A1C: CPT | Performed by: STUDENT IN AN ORGANIZED HEALTH CARE EDUCATION/TRAINING PROGRAM

## 2023-11-09 PROCEDURE — 86803 HEPATITIS C AB TEST: CPT | Performed by: STUDENT IN AN ORGANIZED HEALTH CARE EDUCATION/TRAINING PROGRAM

## 2023-11-09 PROCEDURE — 80053 COMPREHEN METABOLIC PANEL: CPT | Performed by: STUDENT IN AN ORGANIZED HEALTH CARE EDUCATION/TRAINING PROGRAM

## 2023-11-09 PROCEDURE — 36415 COLL VENOUS BLD VENIPUNCTURE: CPT | Performed by: STUDENT IN AN ORGANIZED HEALTH CARE EDUCATION/TRAINING PROGRAM

## 2023-11-09 PROCEDURE — 80061 LIPID PANEL: CPT | Performed by: STUDENT IN AN ORGANIZED HEALTH CARE EDUCATION/TRAINING PROGRAM

## 2023-11-09 PROCEDURE — 87624 HPV HI-RISK TYP POOLED RSLT: CPT | Performed by: STUDENT IN AN ORGANIZED HEALTH CARE EDUCATION/TRAINING PROGRAM

## 2023-11-09 PROCEDURE — 90480 ADMN SARSCOV2 VAC 1/ONLY CMP: CPT | Performed by: STUDENT IN AN ORGANIZED HEALTH CARE EDUCATION/TRAINING PROGRAM

## 2023-11-09 PROCEDURE — 85027 COMPLETE CBC AUTOMATED: CPT | Performed by: STUDENT IN AN ORGANIZED HEALTH CARE EDUCATION/TRAINING PROGRAM

## 2023-11-09 PROCEDURE — G0145 SCR C/V CYTO,THINLAYER,RESCR: HCPCS | Performed by: STUDENT IN AN ORGANIZED HEALTH CARE EDUCATION/TRAINING PROGRAM

## 2023-11-09 PROCEDURE — 99385 PREV VISIT NEW AGE 18-39: CPT | Performed by: STUDENT IN AN ORGANIZED HEALTH CARE EDUCATION/TRAINING PROGRAM

## 2023-11-09 RX ORDER — CLINDAMYCIN PHOSPHATE 10 MG/G
GEL TOPICAL 2 TIMES DAILY
Qty: 60 G | Refills: 3 | Status: SHIPPED | OUTPATIENT
Start: 2023-11-09

## 2023-11-09 ASSESSMENT — ENCOUNTER SYMPTOMS
HEMATOCHEZIA: 0
ARTHRALGIAS: 1
HEARTBURN: 0
BREAST MASS: 0
EYE PAIN: 0
SORE THROAT: 0
NAUSEA: 0
FREQUENCY: 0
FEVER: 0
SHORTNESS OF BREATH: 0
JOINT SWELLING: 0
DYSURIA: 0
HEMATURIA: 0
HEADACHES: 0
MYALGIAS: 1
PARESTHESIAS: 0
PALPITATIONS: 0
ABDOMINAL PAIN: 0
DIZZINESS: 0
WEAKNESS: 0
COUGH: 0
DIARRHEA: 0
CONSTIPATION: 0
NERVOUS/ANXIOUS: 0
CHILLS: 0

## 2023-11-09 ASSESSMENT — PAIN SCALES - GENERAL: PAINLEVEL: NO PAIN (0)

## 2023-11-09 NOTE — PATIENT INSTRUCTIONS
Jesse Paez    Preventive Health Recommendations  Female Ages 26 - 39  Yearly exam:   See your health care provider every year in order to  Review health changes.   Discuss preventive care.    Review your medicines if you your doctor has prescribed any.    Until age 30: Get a Pap test every three years (more often if you have had an abnormal result).    After age 30: Talk to your doctor about whether you should have a Pap test every 3 years or have a Pap test with HPV screening every 5 years.   You do not need a Pap test if your uterus was removed (hysterectomy) and you have not had cancer.  You should be tested each year for STDs (sexually transmitted diseases), if you're at risk.   Talk to your provider about how often to have your cholesterol checked.  If you are at risk for diabetes, you should have a diabetes test (fasting glucose).  Shots: Get a flu shot each year. Get a tetanus shot every 10 years.   Nutrition:   Eat at least 5 servings of fruits and vegetables each day.  Eat whole-grain bread, whole-wheat pasta and brown rice instead of white grains and rice.  Get adequate Calcium and Vitamin D.     Lifestyle  Exercise at least 150 minutes a week (30 minutes a day, 5 days of the week). This will help you control your weight and prevent disease.  Limit alcohol to one drink per day.  No smoking.   Wear sunscreen to prevent skin cancer.  See your dentist every six months for an exam and cleaning.

## 2023-11-09 NOTE — PROGRESS NOTES
SUBJECTIVE:   CC: Georgette is an 38 year old who presents for preventive health visit       11/9/2023     1:59 PM   Additional Questions   Roomed by Whit   Accompanied by Self         11/9/2023     1:59 PM   Patient Reported Additional Medications   Patient reports taking the following new medications None     Healthy Habits:     Getting at least 3 servings of Calcium per day:  NO    Bi-annual eye exam:  Yes    Dental care twice a year:  Yes    Sleep apnea or symptoms of sleep apnea:  None    Diet:  Regular (no restrictions)    Frequency of exercise:  None    Taking medications regularly:  Yes    Medication side effects:  Not applicable    Additional concerns today:  Yes    Healthy   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 [employer]  Exercise-hard to exercise    LMP-no period  Contraception-IUD in place  STD screening-none  Pap/hpv-DUE    Dad has familial hypertrophic cardiomyopathy  -diagnosed few months ago  -was having chest pressure    Kidney stone  -passed w/o surgery  -last surgery 2021      Today's PHQ-2 Score:       11/9/2023     6:43 AM   PHQ-2 ( 1999 [employer])   Q1: Little interest or pleasure in doing things 0   Q2: Feeling down, depressed or hopeless 0   PHQ-2 Score 0   Q1: Little interest or pleasure in doing things Not at all   Q2: Feeling down, depressed or hopeless Not at all   PHQ-2 Score 0     Have you ever done Advance Care Planning? (For example, a Health Directive, POLST, or a discussion with a medical provider or your loved ones about your wishes): Yes, patient states has an Advance Care Planning document and will bring a copy to the clinic.    Social History     Tobacco Use    Smoking status: Never     Passive exposure: Never    Smokeless tobacco: Never   Substance Use Topics    Alcohol use: Yes     Alcohol/week: 0.0 standard drinks of alcohol     Comment: pregnant         11/9/2023     6:43 AM   Alcohol Use   Prescreen: >3 drinks/day or >7 drinks/week? No     Reviewed orders with patient.  Reviewed health maintenance  and updated orders accordingly - Yes      Breast Cancer Screening:    FHS-7:       2023     6:45 AM   Breast CA Risk Assessment (FHS-7)   Did any of your first-degree relatives have breast or ovarian cancer? Yes   Did any of your relatives have bilateral breast cancer? No   Did any man in your family have breast cancer? No   Did any woman in your family have breast and ovarian cancer? Yes   Did any woman in your family have breast cancer before age 50 y? Yes   Do you have 2 or more relatives with breast and/or ovarian cancer? Yes   Do you have 2 or more relatives with breast and/or bowel cancer? Yes     Pertinent mammograms are reviewed under the imaging tab.    History of abnormal Pap smear: NO - age 30-65 PAP every 5 years with negative HPV co-testing recommended      Latest Ref Rng & Units 3/27/2018     3:02 PM 3/27/2018     1:47 PM 3/27/2018    12:00 AM   PAP / HPV   PAP (Historical)  NIL      HPV 16 DNA NEG^Negative  Negative     HPV 18 DNA NEG^Negative  Negative     HPV_EXT - HISTORICAL    See Scanned Report    Other HR HPV NEG^Negative  Negative       Reviewed and updated as needed this visit by clinical staff   Tobacco  Allergies  Meds  Problems             Reviewed and updated as needed this visit by Provider      Problems            Past Medical History:   Diagnosis Date    Anemia     Fractured tibia 2015    Genital HSV     History of chlamydia     History of herpes genitalis     History of primary  section 2019    Formatting of this note might be different from the original. For breech presentation    History of varicella as a child     Kidney stone     Kidney stones     Morbid obesity (H)     Obesity affecting pregnancy in second trimester 2019    BMI >35 Recommendations:  Body mass index is 41.32 kg/m . at first OB   **MFM RECOMMENDS: Growth at 32 weeks, BPPs at 36 weeks and delivery between 39-40 weeks.  Follow-up is scheduled here in 3 week(s) to reassess anatomy that  was suboptimally seen today.  Hbg A1C at 1st OB:  4.8  MFM Fetal Survey: WNL on anatomy seen follow up 3 weeks later for suboptimally seen anatomy  1 hr GCT at 28w:  **  Gr    PONV (postoperative nausea and vomiting)     Shingles       Past Surgical History:   Procedure Laterality Date    ABDOMEN SURGERY      AS REDUCTION OF LARGE BREAST  2001     SECTION Bilateral 2019    Procedure: PRIMARY  SECTION FOR BREECH PRESENTATION;  Surgeon: Bethanie Rolon MD;  Location: [facility] OR;  Service: Obstetrics    CHG X-RAY RETROGRADE PYELOGRAM Right 2021    Procedure: CYSTOURETEROSCOPY, WITH RETROGRADE PYELOGRAM, HOLMIUM LASER LITHOTRIPSY OF URETERAL CALCULUS, AND STENT INSERTION RIGHT;  Surgeon: Chai Lewis MD;  Location: [facility];  Service: Urology    EXCISE LESION LOWER EXTREMITY Right 2019    Procedure: EXCISION SKIN LESION RIGHT FOOT;  Surgeon: Trace Benson DPM;  Location: [facility] OR;  Service: Podiatry    MAMMOPLASTY REDUCTION      TONSILLECTOMY      TONSILLECTOMY       OB History    Para Term  AB Living   1 0 0 0 0 0   SAB IAB Ectopic Multiple Live Births   0 0 0 0 0      # Outcome Date GA Lbr Edward/2nd Weight Sex Delivery Anes PTL Lv   1                 Review of Systems   Constitutional:  Negative for chills and fever.   HENT:  Negative for congestion, ear pain, hearing loss and sore throat.    Eyes:  Negative for pain and visual disturbance.   Respiratory:  Negative for cough and shortness of breath.    Cardiovascular:  Negative for chest pain, palpitations and peripheral edema.   Gastrointestinal:  Negative for abdominal pain, constipation, diarrhea, heartburn, hematochezia and nausea.   Breasts:  Negative for tenderness, breast mass and discharge.   Genitourinary:  Negative for dysuria, frequency, genital sores, hematuria, pelvic pain, urgency, vaginal bleeding and vaginal discharge.   Musculoskeletal:  Positive for arthralgias  "and myalgias. Negative for joint swelling.   Skin:  Negative for rash.   Neurological:  Negative for dizziness, weakness, headaches and paresthesias.   Psychiatric/Behavioral:  Negative for mood changes. The patient is not nervous/anxious.      OBJECTIVE:   /84 (BP Location: Right arm, Patient Position: Sitting, Cuff Size: Adult Large)   Pulse 95   Temp 98.1  F (36.7  C) (Temporal)   Resp 16   Ht 1.764 m (5' 9.45\")   Wt 135.3 kg (298 lb 3.2 oz)   SpO2 97%   BMI 43.47 kg/m    Physical Exam  Constitutional:       General: She is not in acute distress.     Appearance: Normal appearance. She is well-developed.   HENT:      Head: Normocephalic and atraumatic.      Right Ear: Tympanic membrane, ear canal and external ear normal.      Left Ear: Tympanic membrane, ear canal and external ear normal.      Nose: Nose normal.      Mouth/Throat:      Mouth: Mucous membranes are moist.      Pharynx: No oropharyngeal exudate.   Eyes:      Extraocular Movements: Extraocular movements intact.      Conjunctiva/sclera: Conjunctivae normal.      Pupils: Pupils are equal, round, and reactive to light.   Cardiovascular:      Rate and Rhythm: Normal rate and regular rhythm.      Heart sounds: Normal heart sounds. No murmur heard.  Pulmonary:      Effort: Pulmonary effort is normal.      Breath sounds: No wheezing or rales.   Chest:   Breasts:     Right: Normal.      Left: Normal.   Abdominal:      General: Bowel sounds are normal.      Palpations: Abdomen is soft.      Tenderness: There is no abdominal tenderness.   Musculoskeletal:      Cervical back: Normal range of motion and neck supple. No rigidity.   Lymphadenopathy:      Cervical: No cervical adenopathy.      Upper Body:      Right upper body: No supraclavicular, axillary or pectoral adenopathy.      Left upper body: No supraclavicular, axillary or pectoral adenopathy.   Skin:     General: Skin is warm and dry.      Findings: No rash.   Neurological:      General: No " focal deficit present.      Mental Status: She is alert and oriented to person, place, and time.   Psychiatric:         Mood and Affect: Mood normal.         Behavior: Behavior normal.       ASSESSMENT/PLAN:     Routine physical  IUD in place  Class 3 obesity  -Vitals: WNL  -Mammo: DUE age 39, ordered  -Pap: done today  -Contraception: IUD  -Immunizations: Covid booster  -Labs: cmp, A1c, lipid, cbc  -Exercise: encouraged routine exercise    Family history of hypertrophic cardiomyopathy  Father diagnosed recently with this after chest pain and Afib. Was advised to have all family members screened. Had a grandfather who  young from cardiac issues (likely HOCM). Will get echo. If abnormal, will refer to cardiology.    Boils  Could be hidradenitis suppurativa. Boils in groin and armpits. Will try clindamycin gel. If no improvement, will refer to derm.    COUNSELING:  Reviewed preventive health counseling, as reflected in patient instructions      She reports that she has never smoked. She has never been exposed to tobacco smoke. She has never used smokeless tobacco.      Ancelmo Paez DO  [facility]

## 2023-11-09 NOTE — LETTER
To whom it may concern:      Georgette Kristyn was seen in our clinic on 11/9/23. Please excuse her from work today, 11/9/23.        Dr. Paez

## 2023-11-10 LAB — HCV AB SERPL QL IA: NONREACTIVE

## 2023-11-13 LAB
BKR LAB AP GYN ADEQUACY: NORMAL
BKR LAB AP GYN INTERPRETATION: NORMAL
BKR LAB AP HPV REFLEX: NORMAL
BKR LAB AP PREVIOUS ABNORMAL: NORMAL
PATH REPORT.COMMENTS IMP SPEC: NORMAL
PATH REPORT.COMMENTS IMP SPEC: NORMAL
PATH REPORT.RELEVANT HX SPEC: NORMAL

## 2023-11-15 LAB
HUMAN PAPILLOMA VIRUS 16 DNA: NEGATIVE
HUMAN PAPILLOMA VIRUS 18 DNA: NEGATIVE
HUMAN PAPILLOMA VIRUS FINAL DIAGNOSIS: NORMAL
HUMAN PAPILLOMA VIRUS OTHER HR: NEGATIVE

## 2024-01-04 ENCOUNTER — HOSPITAL ENCOUNTER (OUTPATIENT)
Dept: CARDIOLOGY | Facility: CLINIC | Age: 39
Discharge: HOME | End: 2024-01-04
Attending: STUDENT IN AN ORGANIZED HEALTH CARE EDUCATION/TRAINING PROGRAM | Admitting: STUDENT IN AN ORGANIZED HEALTH CARE EDUCATION/TRAINING PROGRAM
Payer: COMMERCIAL

## 2024-01-04 DIAGNOSIS — I51.7 CONCENTRIC LEFT VENTRICULAR HYPERTROPHY: Primary | ICD-10-CM

## 2024-01-04 DIAGNOSIS — Z82.49 FAMILY HISTORY OF HYPERTROPHIC CARDIOMYOPATHY: ICD-10-CM

## 2024-01-04 LAB — LVEF ECHO: NORMAL

## 2024-01-04 PROCEDURE — 93306 TTE W/DOPPLER COMPLETE: CPT | Mod: 26 | Performed by: INTERNAL MEDICINE

## 2024-01-04 PROCEDURE — C8929 TTE W OR WO FOL WCON,DOPPLER: HCPCS

## 2024-01-04 PROCEDURE — 999N000208 ECHOCARDIOGRAM COMPLETE

## 2024-01-04 PROCEDURE — 255N000002 HC RX 255 OP 636: Performed by: STUDENT IN AN ORGANIZED HEALTH CARE EDUCATION/TRAINING PROGRAM

## 2024-01-04 RX ADMIN — PERFLUTREN 2 ML: 6.52 INJECTION, SUSPENSION INTRAVENOUS at 09:40

## 2024-01-06 ENCOUNTER — MYC MEDICAL ADVICE (OUTPATIENT)
Dept: FAMILY MEDICINE | Facility: CLINIC | Age: 39
End: 2024-01-06
Payer: COMMERCIAL

## 2024-03-05 ENCOUNTER — ANCILLARY PROCEDURE (OUTPATIENT)
Dept: MAMMOGRAPHY | Facility: CLINIC | Age: 39
End: 2024-03-05
Attending: STUDENT IN AN ORGANIZED HEALTH CARE EDUCATION/TRAINING PROGRAM
Payer: COMMERCIAL

## 2024-03-05 DIAGNOSIS — Z80.3 FAMILY HISTORY OF MALIGNANT NEOPLASM OF BREAST: ICD-10-CM

## 2024-03-05 DIAGNOSIS — Z12.31 ENCOUNTER FOR SCREENING MAMMOGRAM FOR MALIGNANT NEOPLASM OF BREAST: ICD-10-CM

## 2024-03-05 PROCEDURE — 77067 SCR MAMMO BI INCL CAD: CPT

## 2024-03-06 ENCOUNTER — OFFICE VISIT (OUTPATIENT)
Dept: DERMATOLOGY | Facility: CLINIC | Age: 39
End: 2024-03-06
Payer: COMMERCIAL

## 2024-03-06 DIAGNOSIS — L73.2 HIDRADENITIS SUPPURATIVA: Primary | ICD-10-CM

## 2024-03-06 PROCEDURE — 99243 OFF/OP CNSLTJ NEW/EST LOW 30: CPT | Performed by: DERMATOLOGY

## 2024-03-06 RX ORDER — CLINDAMYCIN AND BENZOYL PEROXIDE 10; 50 MG/G; MG/G
GEL TOPICAL 2 TIMES DAILY
Qty: 50 G | Refills: 11 | Status: SHIPPED | OUTPATIENT
Start: 2024-03-06

## 2024-03-06 RX ORDER — DOXYCYCLINE 100 MG/1
100 CAPSULE ORAL 2 TIMES DAILY
Qty: 60 CAPSULE | Refills: 6 | Status: SHIPPED | OUTPATIENT
Start: 2024-03-06

## 2024-03-06 NOTE — PROGRESS NOTES
Georgette Simons , a 39 year old year old female patient, I was asked to see by Dr. Paez for boils on skin.  She gets them in axilla and groin. She has had this for years.  No lesions today  She does smoke,   .   Patient has no other skin complaints today.  Remainder of the HPI, Meds, PMH, Allergies, FH, and SH was reviewed in chart.      Past Medical History:   Diagnosis Date    Anemia     Fractured tibia 2015    Genital HSV     History of chlamydia     History of herpes genitalis     History of primary  section 2019    Formatting of this note might be different from the original. For breech presentation    History of varicella as a child     Kidney stone     Kidney stones     Morbid obesity (H)     Obesity affecting pregnancy in second trimester 2019    BMI >35 Recommendations:  Body mass index is 41.32 kg/m . at first OB   **MFM RECOMMENDS: Growth at 32 weeks, BPPs at 36 weeks and delivery between 39-40 weeks.  Follow-up is scheduled here in 3 week(s) to reassess anatomy that was suboptimally seen today.  Hbg A1C at 1st OB:  4.8  MFM Fetal Survey: WNL on anatomy seen follow up 3 weeks later for suboptimally seen anatomy  1 hr GCT at 28w:  **  Gr    PONV (postoperative nausea and vomiting)     Shingles        Past Surgical History:   Procedure Laterality Date    ABDOMEN SURGERY      AS REDUCTION OF LARGE BREAST  2001     SECTION Bilateral 2019    Procedure: PRIMARY  SECTION FOR BREECH PRESENTATION;  Surgeon: Bethanie Rolon MD;  Location: [facility];  Service: Obstetrics    CH X-RAY RETROGRADE PYELOGRAM Right 2021    Procedure: CYSTOURETEROSCOPY, WITH RETROGRADE PYELOGRAM, HOLMIUM LASER LITHOTRIPSY OF URETERAL CALCULUS, AND STENT INSERTION RIGHT;  Surgeon: Chai Lewis MD;  Location: [facility];  Service: Urology    EXCISE LESION LOWER EXTREMITY Right 2019    Procedure: EXCISION SKIN LESION RIGHT FOOT;  Surgeon: Trace Benson DPM;   Location: [facility] OR;  Service: Podiatry    MAMMOPLASTY REDUCTION      TONSILLECTOMY      TONSILLECTOMY  2006        Family History   Problem Relation Age of Onset    Family History Negative Mother     Thyroid Disease Mother         hypothyroidism    Family History Negative Father     Hypertension Maternal Grandmother     Diabetes Maternal Grandfather     Breast Cancer Maternal Aunt         had in 2000    Breast Cancer Mother 48.00    Hypertension Father     Breast Cancer Maternal Aunt     Hypertension Brother     No Known Problems Brother     No Known Problems Son        Social History     Socioeconomic History    Marital status:      Spouse name: Not on file    Number of children: 1    Years of education: Not on file    Highest education level: Not on file   Occupational History    Not on file   Tobacco Use    Smoking status: Never     Passive exposure: Never    Smokeless tobacco: Never   Vaping Use    Vaping Use: Never used   Substance and Sexual Activity    Alcohol use: Yes     Alcohol/week: 0.0 standard drinks of alcohol     Comment: pregnant    Drug use: Never    Sexual activity: Yes     Partners: Male     Birth control/protection: I.U.D.   Other Topics Concern    Parent/sibling w/ CABG, MI or angioplasty before 65F 55M? Not Asked   Social History Narrative    ** Merged History Encounter **          Social Determinants of Health     Financial Resource Strain: Low Risk  (11/9/2023)    Financial Resource Strain     Within the past 12 months, have you or your family members you live with been unable to get utilities (heat, electricity) when it was really needed?: No   Food Insecurity: Low Risk  (11/9/2023)    Food Insecurity     Within the past 12 months, did you worry that your food would run out before you got money to buy more?: No     Within the past 12 months, did the food you bought just not last and you didn t have money to get more?: No   Transportation Needs: Low Risk  (11/9/2023)     Transportation Needs     Within the past 12 months, has lack of transportation kept you from medical appointments, getting your medicines, non-medical meetings or appointments, work, or from getting things that you need?: No   Physical Activity: Not on file   Stress: Not on file   Social Connections: Not on file   Interpersonal Safety: Low Risk  (11/9/2023)    Interpersonal Safety     Do you feel physically and emotionally safe where you currently live?: Yes     Within the past 12 months, have you been hit, slapped, kicked or otherwise physically hurt by someone?: No     Within the past 12 months, have you been humiliated or emotionally abused in other ways by your partner or ex-partner?: No   Housing Stability: Low Risk  (11/9/2023)    Housing Stability     Do you have housing? : Yes     Are you worried about losing your housing?: No       Outpatient Encounter Medications as of 3/6/2024   Medication Sig Dispense Refill    clindamycin (CLINDAMAX) 1 % external gel Apply topically 2 times daily 60 g 3     No facility-administered encounter medications on file as of 3/6/2024.             Review Of Systems  Skin: As above  Eyes: negative  Ears/Nose/Throat: negative  Respiratory: No shortness of breath, dyspnea on exertion, cough, or hemoptysis  Cardiovascular: negative  Gastrointestinal: negative  Genitourinary: negative  Musculoskeletal: negative  Neurologic: negative  Psychiatric: negative  Hematologic/Lymphatic/Immunologic: negative  Endocrine: negative      O:   NAD, WDWN, Alert & Oriented, Mood & Affect wnl, Vitals stable   General appearance antoni ii   Vitals stable   Alert, oriented and in no acute distress     No lesions today     Eyes: Conjunctivae/lids:Normal     ENT: Lips, buccal mucosa, tongue: normal    MSK:Normal    Cardiovascular: peripheral edema none    Pulm: Breathing Normal    Neuro/Psych: Orientation:Normal; Mood/Affect:Normal      A/P:  HS  Pathophysiology discussed with pateint and information  provided   Discharge smoking discussed with patient   Weight loss discussed with patient   Injections, Topicals and orals discussed with patient   Hibiclens daily do not put on face  Doxy twice daily  Benzaclin twice daily  Return to clinic 3 months  It was a pleasure speaking to Georgette Simons today.  Previous clinic  notes and pertinent laboratory tests were reviewed prior to Georgette Simons's visit.  Skin care regimen reviewed with patient: Eliminate harsh soaps, i.e. Dial, zest, irsih spring; Mild soaps such as Cetaphil or Dove sensitive skin, avoid hot or cold showers, aggressive use of emollients including vanicream, cetaphil or cerave discussed with patient.

## 2024-03-06 NOTE — PATIENT INSTRUCTIONS
Take Doxycycline 100 mg twice daily (for 3 months) with food and water. Do NOT lay down with in 30- 60 minutes after taking this medication as it can cause heartburn.      In the morning wash with hibiclens then apply the clindamycin-benzoyl peroxide gel. This can bleach clothing, ect so make sure it is dry prior to dressing.    In the evening apply the clindamycin-benzoyl peroxide gel to affected areas.

## 2024-03-06 NOTE — LETTER
3/6/2024         RE: Georgette Simons  [address] [place] [address]        Dear Colleague,    Thank you for referring your patient, Georgette Simons, to the [facility]. Please see a copy of my visit note below.    Georgette Simons , a 39 year old year old female patient, I was asked to see by Dr. Paez for boils on skin.  She gets them in axilla and groin. She has had this for years.  No lesions today  She does smoke,   .   Patient has no other skin complaints today.  Remainder of the HPI, Meds, PMH, Allergies, FH, and SH was reviewed in chart.      Past Medical History:   Diagnosis Date     Anemia      Fractured tibia 2015     Genital HSV      History of chlamydia      History of herpes genitalis      History of primary  section 2019    Formatting of this note might be different from the original. For breech presentation     History of varicella as a child      Kidney stone      Kidney stones      Morbid obesity (H)      Obesity affecting pregnancy in second trimester 2019    BMI >35 Recommendations:  Body mass index is 41.32 kg/m . at first OB   **MFM RECOMMENDS: Growth at 32 weeks, BPPs at 36 weeks and delivery between 39-40 weeks.  Follow-up is scheduled here in 3 week(s) to reassess anatomy that was suboptimally seen today.  Hbg A1C at 1st OB:  4.8  MFM Fetal Survey: WNL on anatomy seen follow up 3 weeks later for suboptimally seen anatomy  1 hr GCT at 28w:  **  Gr     PONV (postoperative nausea and vomiting)      Shingles        Past Surgical History:   Procedure Laterality Date     ABDOMEN SURGERY       AS REDUCTION OF LARGE BREAST  2001      SECTION Bilateral 2019    Procedure: PRIMARY  SECTION FOR BREECH PRESENTATION;  Surgeon: Bethanie Rolon MD;  Location: [facility];  Service: Obstetrics     CHG X-RAY RETROGRADE PYELOGRAM Right 2021    Procedure: CYSTOURETEROSCOPY, WITH RETROGRADE PYELOGRAM, HOLMIUM LASER  LITHOTRIPSY OF URETERAL CALCULUS, AND STENT INSERTION RIGHT;  Surgeon: Chai Lewis MD;  Location: [facility] OR;  Service: Urology     EXCISE LESION LOWER EXTREMITY Right 11/21/2019    Procedure: EXCISION SKIN LESION RIGHT FOOT;  Surgeon: Trace Benson DPM;  Location: [facility] OR;  Service: Podiatry     MAMMOPLASTY REDUCTION       TONSILLECTOMY       TONSILLECTOMY  2006        Family History   Problem Relation Age of Onset     Family History Negative Mother      Thyroid Disease Mother         hypothyroidism     Family History Negative Father      Hypertension Maternal Grandmother      Diabetes Maternal Grandfather      Breast Cancer Maternal Aunt         had in 2000     Breast Cancer Mother 48.00     Hypertension Father      Breast Cancer Maternal Aunt      Hypertension Brother      No Known Problems Brother      No Known Problems Son        Social History     Socioeconomic History     Marital status:      Spouse name: Not on file     Number of children: 1     Years of education: Not on file     Highest education level: Not on file   Occupational History     Not on file   Tobacco Use     Smoking status: Never     Passive exposure: Never     Smokeless tobacco: Never   Vaping Use     Vaping Use: Never used   Substance and Sexual Activity     Alcohol use: Yes     Alcohol/week: 0.0 standard drinks of alcohol     Comment: pregnant     Drug use: Never     Sexual activity: Yes     Partners: Male     Birth control/protection: I.U.D.   Other Topics Concern     Parent/sibling w/ CABG, MI or angioplasty before 65F 55M? Not Asked   Social History Narrative    ** Merged History Encounter **          Social Determinants of Health     Financial Resource Strain: Low Risk  (11/9/2023)    Financial Resource Strain      Within the past 12 months, have you or your family members you live with been unable to get utilities (heat, electricity) when it was really needed?: No   Food Insecurity: Low Risk  (11/9/2023)     Food Insecurity      Within the past 12 months, did you worry that your food would run out before you got money to buy more?: No      Within the past 12 months, did the food you bought just not last and you didn t have money to get more?: No   Transportation Needs: Low Risk  (11/9/2023)    Transportation Needs      Within the past 12 months, has lack of transportation kept you from medical appointments, getting your medicines, non-medical meetings or appointments, work, or from getting things that you need?: No   Physical Activity: Not on file   Stress: Not on file   Social Connections: Not on file   Interpersonal Safety: Low Risk  (11/9/2023)    Interpersonal Safety      Do you feel physically and emotionally safe where you currently live?: Yes      Within the past 12 months, have you been hit, slapped, kicked or otherwise physically hurt by someone?: No      Within the past 12 months, have you been humiliated or emotionally abused in other ways by your partner or ex-partner?: No   Housing Stability: Low Risk  (11/9/2023)    Housing Stability      Do you have housing? : Yes      Are you worried about losing your housing?: No       Outpatient Encounter Medications as of 3/6/2024   Medication Sig Dispense Refill     clindamycin (CLINDAMAX) 1 % external gel Apply topically 2 times daily 60 g 3     No facility-administered encounter medications on file as of 3/6/2024.             Review Of Systems  Skin: As above  Eyes: negative  Ears/Nose/Throat: negative  Respiratory: No shortness of breath, dyspnea on exertion, cough, or hemoptysis  Cardiovascular: negative  Gastrointestinal: negative  Genitourinary: negative  Musculoskeletal: negative  Neurologic: negative  Psychiatric: negative  Hematologic/Lymphatic/Immunologic: negative  Endocrine: negative      O:   NAD, WDWN, Alert & Oriented, Mood & Affect wnl, Vitals stable   General appearance antoni ii   Vitals stable   Alert, oriented and in no acute distress     No  lesions today     Eyes: Conjunctivae/lids:Normal     ENT: Lips, buccal mucosa, tongue: normal    MSK:Normal    Cardiovascular: peripheral edema none    Pulm: Breathing Normal    Neuro/Psych: Orientation:Normal; Mood/Affect:Normal      A/P:  HS  Pathophysiology discussed with pateint and information provided   Discharge smoking discussed with patient   Weight loss discussed with patient   Injections, Topicals and orals discussed with patient   Hibiclens daily do not put on face  Doxy twice daily  Benzaclin twice daily  Return to clinic 3 months  It was a pleasure speaking to Georgette Simons today.  Previous clinic  notes and pertinent laboratory tests were reviewed prior to Georgette Simons's visit.  Skin care regimen reviewed with patient: Eliminate harsh soaps, i.e. Dial, zest, irsih spring; Mild soaps such as Cetaphil or Dove sensitive skin, avoid hot or cold showers, aggressive use of emollients including vanicream, cetaphil or cerave discussed with patient.        Again, thank you for allowing me to participate in the care of your patient.        Sincerely,        Jesse Deras MD

## 2024-10-18 ENCOUNTER — MYC MEDICAL ADVICE (OUTPATIENT)
Dept: FAMILY MEDICINE | Facility: CLINIC | Age: 39
End: 2024-10-18
Payer: COMMERCIAL

## 2024-10-18 ENCOUNTER — HOSPITAL ENCOUNTER (OUTPATIENT)
Dept: CT IMAGING | Facility: HOSPITAL | Age: 39
Discharge: HOME | End: 2024-10-18
Attending: NURSE PRACTITIONER | Admitting: NURSE PRACTITIONER
Payer: COMMERCIAL

## 2024-10-18 ENCOUNTER — MYC MEDICAL ADVICE (OUTPATIENT)
Dept: UROLOGY | Facility: CLINIC | Age: 39
End: 2024-10-18

## 2024-10-18 ENCOUNTER — VIRTUAL VISIT (OUTPATIENT)
Dept: UROLOGY | Facility: CLINIC | Age: 39
End: 2024-10-18
Payer: COMMERCIAL

## 2024-10-18 DIAGNOSIS — R10.9 RIGHT FLANK PAIN: ICD-10-CM

## 2024-10-18 DIAGNOSIS — N20.0 NEPHROLITHIASIS: Primary | ICD-10-CM

## 2024-10-18 DIAGNOSIS — N20.0 NEPHROLITHIASIS: ICD-10-CM

## 2024-10-18 PROCEDURE — 99213 OFFICE O/P EST LOW 20 MIN: CPT | Mod: 95 | Performed by: NURSE PRACTITIONER

## 2024-10-18 PROCEDURE — 74176 CT ABD & PELVIS W/O CONTRAST: CPT

## 2024-10-18 RX ORDER — TAMSULOSIN HYDROCHLORIDE 0.4 MG/1
0.4 CAPSULE ORAL DAILY
Qty: 30 CAPSULE | Refills: 0 | Status: SHIPPED | OUTPATIENT
Start: 2024-10-18

## 2024-10-18 ASSESSMENT — PAIN SCALES - GENERAL: PAINLEVEL: MODERATE PAIN (5)

## 2024-10-18 NOTE — PROGRESS NOTES
Urology Video Office Visit    Video-Visit Details    Type of service:  Video Visit    Video Start Time: 2159    Video End Time: 1315    Originating Location (pt. Location): Home    Distant Location (provider location):  On-site     Platform used for Video Visit: Diabetica           Assessment and Plan:     Assessment:39 year old female with right flank pain.     Plan:  -Reviewed CT scan with patient. No noted ureterolithiasis or hydronephrosis. Noted two nonobstructing stone one in each kidney.   -Recommend to continue with adequate hydration and OTC analgesics PRN for  pain control. Discussed that heat pad/warm bath/shower to help with pain.   -Discussed option of UA/UC for further evaluation of flank pain. Will hold off at this time.   -If having severe flank pain, fevers, chills, nausea, or vomiting please notify Urology clinic or be seen in the ER.   -RTC in 1 year or sooner ASPEN Basurto CNP  Department of Urology  October 18, 2024    I spent a total of 25 minutes spent on the date of the encounter doing chart review, history and exam, documentation, and further activities as noted above.          Chief Complaint:   Right Flank Pain and history of nephrolithiasis          History of Present Illness:    Georgette Simons is a pleasant 39 year old female who presents with concerns of a right flank pain.     Ms. Simons notes right flank pain with nausea which started last week. She notes symptoms are similar to her past kidney stones. Denies any f/c, bouts of emesis, gross hematuria, or dysuria.     CT scan on 10/18/24 (images personally reviewed) revealed no noted ureterolithiasis or hydronephrosis. Noted bilateral nonobstructing renal stones. One stone in right and left kidney.    She was last seen with Urology in July 2023.     History of CaOx/CaP stones         Past Medical History:     Past Medical History:   Diagnosis Date    Anemia     Fractured tibia 06/2015    Genital HSV     History of chlamydia      History of herpes genitalis     History of primary  section 2019    Formatting of this note might be different from the original. For breech presentation    History of varicella as a child     Kidney stone     Kidney stones     Morbid obesity (H)     Obesity affecting pregnancy in second trimester 2019    BMI >35 Recommendations:  Body mass index is 41.32 kg/m . at first OB   **MFM RECOMMENDS: Growth at 32 weeks, BPPs at 36 weeks and delivery between 39-40 weeks.  Follow-up is scheduled here in 3 week(s) to reassess anatomy that was suboptimally seen today.  Hbg A1C at 1st OB:  4.8  MFM Fetal Survey: WNL on anatomy seen follow up 3 weeks later for suboptimally seen anatomy  1 hr GCT at 28w:  **  Gr    PONV (postoperative nausea and vomiting)     Shingles             Past Surgical History:     Past Surgical History:   Procedure Laterality Date    ABDOMEN SURGERY      AS REDUCTION OF LARGE BREAST  2001     SECTION Bilateral 2019    Procedure: PRIMARY  SECTION FOR BREECH PRESENTATION;  Surgeon: Bethanie Rolon MD;  Location: [facility];  Service: Obstetrics    CH X-RAY RETROGRADE PYELOGRAM Right 2021    Procedure: CYSTOURETEROSCOPY, WITH RETROGRADE PYELOGRAM, HOLMIUM LASER LITHOTRIPSY OF URETERAL CALCULUS, AND STENT INSERTION RIGHT;  Surgeon: Chai Lewis MD;  Location: [facility];  Service: Urology    EXCISE LESION LOWER EXTREMITY Right 2019    Procedure: EXCISION SKIN LESION RIGHT FOOT;  Surgeon: Trace Benson DPM;  Location: [facility];  Service: Podiatry    MAMMOPLASTY REDUCTION      TONSILLECTOMY      TONSILLECTOMY  2006            Medications     Current Outpatient Medications   Medication Sig Dispense Refill    chlorhexidine (HIBICLENS) 4 % liquid Wash skin daily do not put on face 473 mL 11    clindamycin (CLINDAMAX) 1 % external gel Apply topically 2 times daily 60 g 3    clindamycin-benzoyl peroxide (BENZACLIN) 1-5 %  external gel Apply topically 2 times daily 50 g 11    doxycycline monohydrate (MONODOX) 100 MG capsule Take 1 capsule (100 mg) by mouth 2 times daily 60 capsule 6     No current facility-administered medications for this visit.            Family History:     Family History   Problem Relation Age of Onset    Family History Negative Mother     Thyroid Disease Mother         hypothyroidism    Family History Negative Father     Hypertension Maternal Grandmother     Diabetes Maternal Grandfather     Breast Cancer Maternal Aunt         had in 2000    Breast Cancer Mother 48.00    Hypertension Father     Breast Cancer Maternal Aunt     Hypertension Brother     No Known Problems Brother     No Known Problems Son             Social History:     Social History     Socioeconomic History    Marital status:      Spouse name: Not on file    Number of children: 1    Years of education: Not on file    Highest education level: Not on file   Occupational History    Not on file   Tobacco Use    Smoking status: Never     Passive exposure: Never    Smokeless tobacco: Never   Vaping Use    Vaping status: Never Used   Substance and Sexual Activity    Alcohol use: Yes     Alcohol/week: 0.0 standard drinks of alcohol     Comment: pregnant    Drug use: Never    Sexual activity: Yes     Partners: Male     Birth control/protection: I.U.D.   Other Topics Concern    Parent/sibling w/ CABG, MI or angioplasty before 65F 55M? Not Asked   Social History Narrative    ** Merged History Encounter **          Social Determinants of Health     Financial Resource Strain: Low Risk  (11/9/2023)    Financial Resource Strain     Within the past 12 months, have you or your family members you live with been unable to get utilities (heat, electricity) when it was really needed?: No   Food Insecurity: Low Risk  (11/9/2023)    Food Insecurity     Within the past 12 months, did you worry that your food would run out before you got money to buy more?: No      Within the past 12 months, did the food you bought just not last and you didn t have money to get more?: No   Transportation Needs: Low Risk  (11/9/2023)    Transportation Needs     Within the past 12 months, has lack of transportation kept you from medical appointments, getting your medicines, non-medical meetings or appointments, work, or from getting things that you need?: No   Physical Activity: Not on file   Stress: Not on file   Social Connections: Not on file   Interpersonal Safety: Low Risk  (11/9/2023)    Interpersonal Safety     Do you feel physically and emotionally safe where you currently live?: Yes     Within the past 12 months, have you been hit, slapped, kicked or otherwise physically hurt by someone?: No     Within the past 12 months, have you been humiliated or emotionally abused in other ways by your partner or ex-partner?: No   Housing Stability: Low Risk  (11/9/2023)    Housing Stability     Do you have housing? : Yes     Are you worried about losing your housing?: No            Allergies:   Pcn [penicillins]         Review of Systems:  From intake questionnaire   Negative 14 system review except as noted on HPI, nurse's note.         Physical Exam:   General Appearance: Well groomed, hygenic  Eyes: No redness, discharge  Respiratory: No cough, no respiratory distress or labored breathing  Musculoskeletal: Grossly normal, full range of motion in upper extremities, no gross deficits  Skin: No discoloration or apparent rashes  Neurologic - No tremors  Psychiatric - Alert and oriented  The rest of a comprehensive physical examination is deferred due to video visit restrictions        Labs:    I personally reviewed all applicable laboratory data and went over findings with patient  Significant for:    CBC RESULTS:  Recent Labs   Lab Test 11/09/23  1448 01/18/21  0832 12/24/20  1150 06/26/20  0937 10/29/19  1300 10/01/19  0640   WBC 10.2  --  7.5 6.2  --  11.6*   HGB 13.5 14.2 13.8 13.1   < > 8.3*      --  332 304  --  210    < > = values in this interval not displayed.        BMP RESULTS:  Recent Labs   Lab Test 11/09/23  1448 12/24/20  1150 06/26/20  0937 10/22/19  1405 03/04/19  0957    136 138 139 138   POTASSIUM 4.0 4.1 4.4 3.9 3.6   CHLORIDE 102 103 106 106 105   CO2 25 23 21* 24  --    ANIONGAP 10 10 11 9 13   GLC 81 89 81 90 102*   BUN 11.0 18 13 11 6   CR 0.66 0.82 0.76 0.82  0.80  --    GFRESTIMATED >90 >60 >60 >60  >60 >90   GFRESTBLACK  --  >60 >60 >60  >60 >90   EMMA 9.9 10.0 10.1 9.8  9.7  --        UA RESULTS:   Recent Labs   Lab Test 06/30/23  1645 12/24/20  1211 11/13/19  1340 10/22/19  1300 09/16/19  0443   SG 1.020 1.015 1.020   < > 1.010   URINEPH 7.5 6.0 7.0   < > 6.0   NITRITE Negative Negative Negative   < > Negative   RBCU 0-2 5-10*  --   --  *   WBCU 0-5 5-10*  --   --  0-5    < > = values in this interval not displayed.       CALCIUM RESULTS  Lab Results   Component Value Date    EMMA 9.9 11/09/2023    EMMA 10.0 12/24/2020    EMMA 10.1 06/26/2020    EMMA 9.0 08/22/2018    EMMA 9.3 03/27/2018    EMMA 9.9 03/20/2018           Imaging:    I personally reviewed all applicable imaging and went over the below findings with patient.    Results for orders placed or performed during the hospital encounter of 10/18/24   CT Abdomen Pelvis w/o Contrast [[id number]]    Narrative    EXAM: CT ABDOMEN PELVIS W/O CONTRAST  LOCATION: [facility]  DATE: 10/18/2024    INDICATION:  Nephrolithiasis  COMPARISON: 6/30/2023  TECHNIQUE: CT scan of the abdomen and pelvis was performed without IV contrast. Multiplanar reformats were obtained. Dose reduction techniques were used.  CONTRAST: None.    FINDINGS:     LOWER CHEST: Unremarkable.    Limited evaluation of solid organs given lack of intravenous contrast.     HEPATOBILIARY: Normal liver contours. No biliary dilation. Normal gallbladder.     PANCREAS: Normal contours.     SPLEEN: Normal contours.    ADRENAL GLANDS: Normal  contours.     KIDNEYS/BLADDER: Normal unenhanced contours. Nonobstructing bilateral nephrolithiasis. Underdistended bladder, limiting evaluation.      BOWEL: No obstruction. Normal appendix. No bowel wall thickening or pneumatosis. No pneumoperitoneum or free fluid.     LYMPH NODES: No pathologically enlarged lymph nodes.    VASCULATURE: Nonaneurysmal aorta.     PELVIC ORGANS: Appropriately positioned intrauterine device.     MUSCULOSKELETAL: No acute osseous abnormalities. Tiny, fat-containing umbilical hernia.       Impression    IMPRESSION:  Nonobstructing bilateral nephrolithiasis. No hydronephrosis or ureterolithiasis.

## 2024-10-18 NOTE — PATIENT INSTRUCTIONS
UROLOGY CLINIC VISIT PATIENT INSTRUCTIONS    -If having severe flank pain, fevers, chills, nausea, or vomiting please notify Urology clinic or be seen in the ER.     If you have any issues, questions or concerns in the meantime, do not hesitate to contact us at [facility] at [phone] or via [employer].     Catia Bausrto, CNP  Department of Urology     Medicines to Control Your Kidney Stone Symptoms    Control Pain: First Line Treatment    Dramamine (Please use the drowsy version, nongeneric formulation)  Available over the counter  **This medicine will cause increased drowsiness. DON T DRIVE OR OPERATE MACHINERY FOR 6 HOURS**    How to take:   Take 50 mg at bedtime every night until the stone passes  In addition, take 50 mg every 6 hours as needed    What it does:  Decreases spasm of the ureter  Decreases recurrence of pain for next 24 hours  Decreases severe pain  Decreases nausea  Will help you sleep    Ibuprofen (Advil or Motrin)  Available over the counter  **Please do not take if advise to avoid NSAIDS, history of stomach ulcers/bleeding issues, blood thinners, or already on NSAIDS**    How to take:   Take 2 to 4 (200 mg) tablets every 6 hours for the first 48 hours. After that, use only as needed    What it does:  Decreases pain  Prevents spasm of the ureter    Acetaminophen (Tylenol)   Available over the counter    How to Take:  Take 2 (500 mg) tablets every 6 hours as needed. Do not exceed 8 tablets (4,000 mg total) in 24 hours    What it does:  Highly effective in controlling pain      Other medicines we may give you:    Tamsulosin (Flomax): Take 0.4 mg daily with food     What it does:   May decrease stone pain   May help stones pass faster   May make surgery more successful by improving access to stone   May decrease discomfort from ureteral stent, if used    Possible side effects:   Lightheadedness when standing too quickly (especially in older people)   Stuffy nose

## 2024-10-18 NOTE — PROGRESS NOTES
Virtual Visit Details    Type of service:  Video Visit     Originating Location (pt. Location): Home  {PROVIDER LOCATION On-site should be selected for visits conducted from your clinic location or adjoining [facility] hospital, academic office, or other nearby [facility] building. Off-site should be selected for all other provider locations, including home:[id number]}  Distant Location (provider location):  On-site  Platform used for Video Visit: Biodesy

## 2024-10-18 NOTE — NURSING NOTE
Is the patient currently in the state of [place]? YES    Visit mode:VIDEO    If the visit is dropped, the patient can be reconnected by: VIDEO VISIT: Send to e-mail at: [email or web address]    Will anyone else be joining the visit? NO  (If patient encounters technical issues they should call [phone]    How would you like to obtain your AVS? MyChart    Are changes needed to the allergy or medication list? No    Are refills needed on medications prescribed by this physician? NO    Reason for visit: Follow Up    Marry ARDON

## 2024-11-19 ENCOUNTER — MYC MEDICAL ADVICE (OUTPATIENT)
Dept: FAMILY MEDICINE | Facility: CLINIC | Age: 39
End: 2024-11-19
Payer: COMMERCIAL

## 2024-11-25 NOTE — PATIENT INSTRUCTIONS
Patient Education       Thank you for coming to see me today! Here are a couple of pieces of information about my schedule and communication practices.     I am not in the clinic on Tuesdays. Non-urgent calls and messages received on Tuesdays will be addressed as soon as I am able when I am back in the office on the next business day. Urgent calls will be addressed by a covering clinician.       If lab work was done today as part of your evaluation you will generally be contacted via [employer], mail, or phone with the results within 7-10 days.  If there is an alarming/concerning result we will contact you by phone. Lab results come back at varying times, I generally wait until all labs are resulted before making comments on results. Please note, labs are automatically released to [employer] once available, but it may take a couple of days for my interpretation note to appear.      I try very hard to respond to medical messages with 2 business days of receiving them. Occasionally it takes me longer if I am trying to figure out the best way to respond and need to seek guidance, do some research or dig deeper into your medical history to come up with a helpful response.      If you need refills please contact your pharmacist. They will send a refill request to me to review. Please allow 3-5 business days for us to respond to all refill requests.      Please call or send a medical message with any questions or concerns. Thank you for trusting me to be part of your healthcare team!        Dr. Ancelmo Paez    Preventive Care Advice   This is general advice given by our system to help you stay healthy. However, your care team may have specific advice just for you. Please talk to your care team about your preventive care needs.  Nutrition  Eat 5 or more servings of fruits and vegetables each day.  Try wheat bread, brown rice and whole grain pasta (instead of white bread, rice, and pasta).  Get enough calcium and vitamin D. Check  the label on foods and aim for 100% of the RDA (recommended daily allowance).  Lifestyle  Exercise at least 150 minutes each week  (30 minutes a day, 5 days a week).  Do muscle strengthening activities 2 days a week. These help control your weight and prevent disease.  No smoking.  Wear sunscreen to prevent skin cancer.  Have a dental exam and cleaning every 6 months.  Yearly exams  See your health care team every year to talk about:  Any changes in your health.  Any medicines your care team has prescribed.  Preventive care, family planning, and ways to prevent chronic diseases.  Shots (vaccines)   HPV shots (up to age 26), if you've never had them before.  Hepatitis B shots (up to age 59), if you've never had them before.  COVID-19 shot: Get this shot when it's due.  Flu shot: Get a flu shot every year.  Tetanus shot: Get a tetanus shot every 10 years.  Pneumococcal, hepatitis A, and RSV shots: Ask your care team if you need these based on your risk.  Shingles shot (for age 50 and up)  General health tests  Diabetes screening:  Starting at age 35, Get screened for diabetes at least every 3 years.  If you are younger than age 35, ask your care team if you should be screened for diabetes.  Cholesterol test: At age 39, start having a cholesterol test every 5 years, or more often if advised.  Bone density scan (DEXA): At age 50, ask your care team if you should have this scan for osteoporosis (brittle bones).  Hepatitis C: Get tested at least once in your life.  STIs (sexually transmitted infections)  Before age 24: Ask your care team if you should be screened for STIs.  After age 24: Get screened for STIs if you're at risk. You are at risk for STIs (including HIV) if:  You are sexually active with more than one person.  You don't use condoms every time.  You or a partner was diagnosed with a sexually transmitted infection.  If you are at risk for HIV, ask about PrEP medicine to prevent HIV.  Get tested for HIV at  least once in your life, whether you are at risk for HIV or not.  Cancer screening tests  Cervical cancer screening: If you have a cervix, begin getting regular cervical cancer screening tests starting at age 21.  Breast cancer scan (mammogram): If you've ever had breasts, begin having regular mammograms starting at age 40. This is a scan to check for breast cancer.  Colon cancer screening: It is important to start screening for colon cancer at age 45.  Have a colonoscopy test every 10 years (or more often if you're at risk) Or, ask your provider about stool tests like a FIT test every year or Cologuard test every 3 years.  To learn more about your testing options, visit:   .  For help making a decision, visit:   https://bit.ly/ew62647.  Prostate cancer screening test: If you have a prostate, ask your care team if a prostate cancer screening test (PSA) at age 55 is right for you.  Lung cancer screening: If you are a current or former smoker ages 50 to 80, ask your care team if ongoing lung cancer screenings are right for you.  For informational purposes only. Not to replace the advice of your health care provider. Copyright   2023 [facility]. All rights reserved. Clinically reviewed by the [facility]itions Program. [employer] 398069 - REV 01/24.  Learning About Stress  What is stress?     Stress is your body's response to a hard situation. Your body can have a physical, emotional, or mental response. Stress is a fact of life for most people, and it affects everyone differently. What causes stress for you may not be stressful for someone else.  A lot of things can cause stress. You may feel stress when you go on a job interview, take a test, or run a race. This kind of short-term stress is normal and even useful. It can help you if you need to work hard or react quickly. For example, stress can help you finish an important job on time.  Long-term stress is caused by ongoing stressful  situations or events. Examples of long-term stress include long-term health problems, ongoing problems at work, or conflicts in your family. Long-term stress can harm your health.  How does stress affect your health?  When you are stressed, your body responds as though you are in danger. It makes hormones that speed up your heart, make you breathe faster, and give you a burst of energy. This is called the fight-or-flight stress response. If the stress is over quickly, your body goes back to normal and no harm is done.  But if stress happens too often or lasts too long, it can have bad effects. Long-term stress can make you more likely to get sick, and it can make symptoms of some diseases worse. If you tense up when you are stressed, you may develop neck, shoulder, or low back pain. Stress is linked to high blood pressure and heart disease.  Stress also harms your emotional health. It can make you coffey, tense, or depressed. Your relationships may suffer, and you may not do well at work or school.  What can you do to manage stress?  You can try these things to help manage stress:   Do something active. Exercise or activity can help reduce stress. Walking is a great way to get started. Even everyday activities such as housecleaning or yard work can help.  Try yoga or chema chi. These techniques combine exercise and meditation. You may need some training at first to learn them.  Do something you enjoy. For example, listen to music or go to a movie. Practice your hobby or do volunteer work.  Meditate. This can help you relax, because you are not worrying about what happened before or what may happen in the future.  Do guided imagery. Imagine yourself in any setting that helps you feel calm. You can use online videos, books, or a teacher to guide you.  Do breathing exercises. For example:  From a standing position, bend forward from the waist with your knees slightly bent. Let your arms dangle close to the floor.  Breathe  "in slowly and deeply as you return to a standing position. Roll up slowly and lift your head last.  Hold your breath for just a few seconds in the standing position.  Breathe out slowly and bend forward from the waist.  Let your feelings out. Talk, laugh, cry, and express anger when you need to. Talking with supportive friends or family, a counselor, or a dex leader about your feelings is a healthy way to relieve stress. Avoid discussing your feelings with people who make you feel worse.  Write. It may help to write about things that are bothering you. This helps you find out how much stress you feel and what is causing it. When you know this, you can find better ways to cope.  What can you do to prevent stress?  You might try some of these things to help prevent stress:  Manage your time. This helps you find time to do the things you want and need to do.  Get enough sleep. Your body recovers from the stresses of the day while you are sleeping.  Get support. Your family, friends, and community can make a difference in how you experience stress.  Limit your news feed. Avoid or limit time on social media or news that may make you feel stressed.  Do something active. Exercise or activity can help reduce stress. Walking is a great way to get started.  Where can you learn more?  Go to https://www.[employer].net/patiented  Enter N032 in the search box to learn more about \"Learning About Stress.\"  Current as of: October 24, 2023  Content Version: 14.2 2024 [employer].   Care instructions adapted under license by your healthcare professional. If you have questions about a medical condition or this instruction, always ask your healthcare professional. [employer] disclaims any warranty or liability for your use of this information.       "

## 2024-11-26 SDOH — HEALTH STABILITY: PHYSICAL HEALTH: ON AVERAGE, HOW MANY MINUTES DO YOU ENGAGE IN EXERCISE AT THIS LEVEL?: 30 MIN

## 2024-11-26 SDOH — HEALTH STABILITY: PHYSICAL HEALTH: ON AVERAGE, HOW MANY DAYS PER WEEK DO YOU ENGAGE IN MODERATE TO STRENUOUS EXERCISE (LIKE A BRISK WALK)?: 3 DAYS

## 2024-11-26 ASSESSMENT — SOCIAL DETERMINANTS OF HEALTH (SDOH): HOW OFTEN DO YOU GET TOGETHER WITH FRIENDS OR RELATIVES?: ONCE A WEEK

## 2024-11-27 ENCOUNTER — OFFICE VISIT (OUTPATIENT)
Dept: FAMILY MEDICINE | Facility: CLINIC | Age: 39
End: 2024-11-27
Payer: COMMERCIAL

## 2024-11-27 VITALS
RESPIRATION RATE: 16 BRPM | HEIGHT: 69 IN | HEART RATE: 76 BPM | TEMPERATURE: 97.3 F | DIASTOLIC BLOOD PRESSURE: 80 MMHG | OXYGEN SATURATION: 98 % | WEIGHT: 245.7 LBS | SYSTOLIC BLOOD PRESSURE: 118 MMHG | BODY MASS INDEX: 36.39 KG/M2

## 2024-11-27 DIAGNOSIS — Z97.5 IUD (INTRAUTERINE DEVICE) IN PLACE: ICD-10-CM

## 2024-11-27 DIAGNOSIS — Z13.220 SCREENING FOR LIPID DISORDERS: ICD-10-CM

## 2024-11-27 DIAGNOSIS — Z13.1 SCREENING FOR DIABETES MELLITUS: ICD-10-CM

## 2024-11-27 DIAGNOSIS — F41.0 PANIC ATTACK: ICD-10-CM

## 2024-11-27 DIAGNOSIS — Z00.00 ROUTINE GENERAL MEDICAL EXAMINATION AT A HEALTH CARE FACILITY: Primary | ICD-10-CM

## 2024-11-27 PROBLEM — Z36.89 ENCOUNTER FOR TRIAGE IN PREGNANT PATIENT: Status: RESOLVED | Noted: 2019-09-13 | Resolved: 2024-11-27

## 2024-11-27 PROBLEM — O09.519 SUPERVISION OF HIGH-RISK PREGNANCY OF ELDERLY PRIMIGRAVIDA: Status: RESOLVED | Noted: 2019-02-28 | Resolved: 2024-11-27

## 2024-11-27 PROBLEM — L98.9 SKIN ABNORMALITY: Status: RESOLVED | Noted: 2019-10-29 | Resolved: 2024-11-27

## 2024-11-27 PROBLEM — O99.213 OBESITY AFFECTING PREGNANCY IN THIRD TRIMESTER: Status: RESOLVED | Noted: 2019-02-28 | Resolved: 2024-11-27

## 2024-11-27 PROBLEM — E87.6 HYPOKALEMIA: Status: RESOLVED | Noted: 2018-02-15 | Resolved: 2024-11-27

## 2024-11-27 PROBLEM — L73.9 FOLLICULITIS: Status: RESOLVED | Noted: 2018-06-04 | Resolved: 2024-11-27

## 2024-11-27 PROBLEM — E83.52 HYPERCALCEMIA: Status: RESOLVED | Noted: 2018-02-15 | Resolved: 2024-11-27

## 2024-11-27 PROBLEM — E66.813 CLASS 3 OBESITY: Status: RESOLVED | Noted: 2018-08-22 | Resolved: 2024-11-27

## 2024-11-27 LAB
CHOLEST SERPL-MCNC: 174 MG/DL
EST. AVERAGE GLUCOSE BLD GHB EST-MCNC: 94 MG/DL
FASTING STATUS PATIENT QL REPORTED: YES
HBA1C MFR BLD: 4.9 % (ref 0–5.6)
HDLC SERPL-MCNC: 38 MG/DL
LDLC SERPL CALC-MCNC: 120 MG/DL
NONHDLC SERPL-MCNC: 136 MG/DL
TRIGL SERPL-MCNC: 81 MG/DL

## 2024-11-27 PROCEDURE — 91320 SARSCV2 VAC 30MCG TRS-SUC IM: CPT | Performed by: STUDENT IN AN ORGANIZED HEALTH CARE EDUCATION/TRAINING PROGRAM

## 2024-11-27 PROCEDURE — 90480 ADMN SARSCOV2 VAC 1/ONLY CMP: CPT | Performed by: STUDENT IN AN ORGANIZED HEALTH CARE EDUCATION/TRAINING PROGRAM

## 2024-11-27 PROCEDURE — 90656 IIV3 VACC NO PRSV 0.5 ML IM: CPT | Performed by: STUDENT IN AN ORGANIZED HEALTH CARE EDUCATION/TRAINING PROGRAM

## 2024-11-27 PROCEDURE — 80061 LIPID PANEL: CPT | Performed by: STUDENT IN AN ORGANIZED HEALTH CARE EDUCATION/TRAINING PROGRAM

## 2024-11-27 PROCEDURE — 90471 IMMUNIZATION ADMIN: CPT | Performed by: STUDENT IN AN ORGANIZED HEALTH CARE EDUCATION/TRAINING PROGRAM

## 2024-11-27 PROCEDURE — 99396 PREV VISIT EST AGE 40-64: CPT | Mod: 25 | Performed by: STUDENT IN AN ORGANIZED HEALTH CARE EDUCATION/TRAINING PROGRAM

## 2024-11-27 PROCEDURE — 83036 HEMOGLOBIN GLYCOSYLATED A1C: CPT | Performed by: STUDENT IN AN ORGANIZED HEALTH CARE EDUCATION/TRAINING PROGRAM

## 2024-11-27 PROCEDURE — 99214 OFFICE O/P EST MOD 30 MIN: CPT | Mod: 25 | Performed by: STUDENT IN AN ORGANIZED HEALTH CARE EDUCATION/TRAINING PROGRAM

## 2024-11-27 PROCEDURE — 36415 COLL VENOUS BLD VENIPUNCTURE: CPT | Performed by: STUDENT IN AN ORGANIZED HEALTH CARE EDUCATION/TRAINING PROGRAM

## 2024-11-27 RX ORDER — PROPRANOLOL HYDROCHLORIDE 10 MG/1
10-20 TABLET ORAL 3 TIMES DAILY
Qty: 30 TABLET | Refills: 1 | Status: SHIPPED | OUTPATIENT
Start: 2024-11-27

## 2024-11-27 ASSESSMENT — PAIN SCALES - GENERAL: PAINLEVEL_OUTOF10: NO PAIN (0)

## 2024-11-27 NOTE — PROGRESS NOTES
"Preventive Care Visit  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 Elected to try propranolol PRN for anxiety. Does not want a daily med. She will reach out if anxiety is worsening.  - propranolol (INDERAL) 10 MG tablet  Dispense: 30 tablet; Refill: 1      Nicotine/Tobacco Cessation  She reports that she has been smoking cigarettes. She started smoking about 20 years ago. She has a 6.4 pack-year smoking history. She has never been exposed to tobacco smoke. She has never used smokeless tobacco.  Nicotine/Tobacco Cessation Plan  Self help information given to patient      BMI  Estimated body mass index is 36.18 kg/m  as calculated from the following:    Height as of this encounter: 1.755 m (5' 9.1\").    Weight as of this encounter: 111.4 kg (245 lb 11.2 oz).   Weight management plan: Discussed healthy diet and exercise guidelines    Counseling  Appropriate preventive services were addressed with this patient via screening, questionnaire, or discussion as appropriate for fall prevention, nutrition, physical activity, Tobacco-use cessation, social engagement, weight loss and cognition.  Checklist reviewing preventive services available has been given to the patient.  Reviewed patient's diet, addressing concerns and/or questions.   She is at risk for lack of exercise and has been provided with information to increase physical activity for the benefit of " her well-being.   She is at risk for psychosocial distress and has been provided with information to reduce risk.           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      11/26/2024   General Health   How would you rate your overall physical health? (!) FAIR   Feel stress (tense, anxious, or unable to sleep) To some extent      (!) STRESS CONCERN      11/26/2024   Nutrition   Three or more servings of calcium each day? Yes   Diet: Regular (no restrictions)   How many servings of fruit and vegetables per day? (!) 2-3   How many sweetened beverages each day? 0-1            11/26/2024   Exercise   Days per week of moderate/strenous exercise 3 days   Average minutes spent exercising at this level 30 min            11/26/2024   Social Factors   Frequency of gathering with friends or relatives Once a week   Worry food won't last until get money to buy more No   Food not last or not have enough money for food? No   Do you have housing? (Housing is defined as stable permanent housing and does not include staying ouside in a car, in a tent, in an abandoned building, in an overnight shelter, or couch-surfing.) Yes   Are you worried about losing your housing? No   Lack of transportation? No   Unable to get utilities (heat,electricity)? No            11/26/2024   Dental   Dentist two times every year? Yes            11/26/2024   TB Screening   Were you born outside of the [place]? No            Today's PHQ-2 Score:       11/26/2024     3:56 PM   PHQ-2 ( 1999 [employer])   Q1: Little interest or pleasure in doing  things 1    Q2: Feeling down, depressed or hopeless 0    PHQ-2 Score 1    Q1: Little interest or pleasure in doing things Several days   Q2: Feeling down, depressed or hopeless Not at all   PHQ-2 Score 1       Patient-reported           2024   Substance Use   Alcohol more than 3/day or more than 7/wk No   Do you use any other substances recreationally? No        Social History     Tobacco Use    Smoking status: Some Days     Current packs/day: 0.00     Average packs/day: 0.5 packs/day for 12.7 years (6.4 ttl pk-yrs)     Types: Cigarettes     Start date: 2004     Last attempt to quit: 2016     Years since quittin.1     Passive exposure: Never    Smokeless tobacco: Never   Vaping Use    Vaping status: Never Used   Substance Use Topics    Alcohol use: Yes     Comment: couple drinks per week    Drug use: No           3/5/2024   LAST [facility]HS-7 RESULTS   1st degree relative breast or ovarian cancer Yes   Any relative bilateral breast cancer No   Any male have breast cancer No   Any woman with breast cancer before 50yrs Yes   2 or more relatives with breast AND/OR ovarian cancer Yes   2 or more relatives with breast AND/OR bowel cancer Yes            2024   STI Screening   New sexual partner(s) since last STI/HIV test? No        History of abnormal Pap smear: No - age 30-64 HPV with reflex Pap every 5 years recommended        Latest Ref Rng & Units 2023     2:23 PM 3/27/2018     3:02 PM 3/27/2018     1:47 PM   PAP / HPV   PAP  Negative for Intraepithelial Lesion or Malignancy (NILM)      PAP (Historical)   NIL     HPV 16 DNA Negative Negative   Negative    HPV 18 DNA Negative Negative   Negative    Other HR HPV Negative Negative   Negative      ASCVD Risk   The 10-year ASCVD risk score (Esteban ORTIZ, et al., 2019) is: 5.5%    Values used to calculate the score:      Age: 39 years      Sex: Female      Is Non- : No      Diabetic: No      Tobacco smoker: Yes       "Systolic Blood Pressure: 118 mmHg      Is BP treated: No      HDL Cholesterol: 33 mg/dL      Total Cholesterol: 200 mg/dL        11/26/2024   Contraception/Family Planning   Questions about contraception or family planning No        Reviewed and updated as needed this visit by Provider   Tobacco   Meds  Problems  Med Hx  Surg Hx  Fam Hx  Soc Hx Sexual   Activity             Objective    Exam  /80 (BP Location: Right arm, Patient Position: Sitting, Cuff Size: Adult Regular)   Pulse 76   Temp 97.3  F (36.3  C) (Temporal)   Resp 16   Ht 1.755 m (5' 9.1\")   Wt 111.4 kg (245 lb 11.2 oz)   LMP  (LMP Unknown)   SpO2 98%   BMI 36.18 kg/m     Estimated body mass index is 36.18 kg/m  as calculated from the following:    Height as of this encounter: 1.755 m (5' 9.1\").    Weight as of this encounter: 111.4 kg (245 lb 11.2 oz).    Physical Exam  Constitutional:       General: She is not in acute distress.     Appearance: She is well-developed.   HENT:      Head: Normocephalic and atraumatic.      Right Ear: Tympanic membrane, ear canal and external ear normal.      Left Ear: Tympanic membrane, ear canal and external ear normal.      Nose: Nose normal.      Mouth/Throat:      Mouth: Mucous membranes are moist.      Pharynx: Oropharynx is clear. No oropharyngeal exudate.   Eyes:      Conjunctiva/sclera: Conjunctivae normal.      Pupils: Pupils are equal, round, and reactive to light.   Neck:      Thyroid: No thyroid mass, thyromegaly or thyroid tenderness.   Cardiovascular:      Rate and Rhythm: Normal rate and regular rhythm.      Heart sounds: Normal heart sounds. No murmur heard.  Pulmonary:      Effort: Pulmonary effort is normal.      Breath sounds: No wheezing or rales.   Chest:   Breasts:     Right: Normal.      Left: Normal.   Abdominal:      General: Abdomen is flat.   Musculoskeletal:      Cervical back: Normal range of motion and neck supple. No rigidity or tenderness.   Lymphadenopathy:      " Cervical: No cervical adenopathy.      Upper Body:      Right upper body: No supraclavicular, axillary or pectoral adenopathy.      Left upper body: No supraclavicular, axillary or pectoral adenopathy.   Skin:     General: Skin is warm and dry.      Findings: No rash.   Neurological:      General: No focal deficit present.      Mental Status: She is alert and oriented to person, place, and time. Mental status is at baseline.   Psychiatric:         Mood and Affect: Mood normal.         Behavior: Behavior normal.         Thought Content: Thought content normal.           Signed Electronically by: Ancelmo Paez DO

## 2025-03-25 ENCOUNTER — ANCILLARY PROCEDURE (OUTPATIENT)
Dept: MAMMOGRAPHY | Facility: CLINIC | Age: 40
End: 2025-03-25
Attending: STUDENT IN AN ORGANIZED HEALTH CARE EDUCATION/TRAINING PROGRAM
Payer: COMMERCIAL

## 2025-03-25 DIAGNOSIS — Z12.31 VISIT FOR SCREENING MAMMOGRAM: ICD-10-CM

## 2025-03-25 PROCEDURE — 77063 BREAST TOMOSYNTHESIS BI: CPT

## 2025-07-04 ENCOUNTER — MYC MEDICAL ADVICE (OUTPATIENT)
Dept: FAMILY MEDICINE | Facility: CLINIC | Age: 40
End: 2025-07-04
Payer: COMMERCIAL